# Patient Record
Sex: MALE | Race: WHITE | Employment: UNEMPLOYED | URBAN - METROPOLITAN AREA
[De-identification: names, ages, dates, MRNs, and addresses within clinical notes are randomized per-mention and may not be internally consistent; named-entity substitution may affect disease eponyms.]

---

## 2018-10-03 ENCOUNTER — APPOINTMENT (OUTPATIENT)
Dept: CT IMAGING | Age: 58
End: 2018-10-03
Payer: COMMERCIAL

## 2018-10-03 ENCOUNTER — APPOINTMENT (OUTPATIENT)
Dept: GENERAL RADIOLOGY | Age: 58
End: 2018-10-03
Payer: COMMERCIAL

## 2018-10-03 ENCOUNTER — HOSPITAL ENCOUNTER (EMERGENCY)
Age: 58
Discharge: LEFT BEFORE DISCHARGE | End: 2018-10-03
Payer: COMMERCIAL

## 2018-10-03 VITALS
BODY MASS INDEX: 21.79 KG/M2 | TEMPERATURE: 98.2 F | DIASTOLIC BLOOD PRESSURE: 89 MMHG | HEART RATE: 78 BPM | HEIGHT: 66 IN | RESPIRATION RATE: 16 BRPM | OXYGEN SATURATION: 97 % | SYSTOLIC BLOOD PRESSURE: 125 MMHG

## 2018-10-03 DIAGNOSIS — R07.9 CHEST PAIN, UNSPECIFIED TYPE: Primary | ICD-10-CM

## 2018-10-03 LAB
ANION GAP SERPL CALCULATED.3IONS-SCNC: 10 MMOL/L (ref 3–16)
BASOPHILS ABSOLUTE: 0 K/UL (ref 0–0.2)
BASOPHILS RELATIVE PERCENT: 1 %
BUN BLDV-MCNC: 12 MG/DL (ref 7–20)
CALCIUM SERPL-MCNC: 8.9 MG/DL (ref 8.3–10.6)
CHLORIDE BLD-SCNC: 105 MMOL/L (ref 99–110)
CO2: 26 MMOL/L (ref 21–32)
CREAT SERPL-MCNC: 0.9 MG/DL (ref 0.8–1.3)
D DIMER: 460 NG/ML DDU (ref 0–229)
EOSINOPHILS ABSOLUTE: 0.1 K/UL (ref 0–0.6)
EOSINOPHILS RELATIVE PERCENT: 2.7 %
GFR AFRICAN AMERICAN: >60
GFR NON-AFRICAN AMERICAN: >60
GLUCOSE BLD-MCNC: 124 MG/DL (ref 70–99)
HCT VFR BLD CALC: 39.9 % (ref 40.5–52.5)
HEMOGLOBIN: 14 G/DL (ref 13.5–17.5)
LYMPHOCYTES ABSOLUTE: 1.5 K/UL (ref 1–5.1)
LYMPHOCYTES RELATIVE PERCENT: 31.6 %
MCH RBC QN AUTO: 32.2 PG (ref 26–34)
MCHC RBC AUTO-ENTMCNC: 34.9 G/DL (ref 31–36)
MCV RBC AUTO: 92.3 FL (ref 80–100)
MONOCYTES ABSOLUTE: 0.5 K/UL (ref 0–1.3)
MONOCYTES RELATIVE PERCENT: 10.8 %
NEUTROPHILS ABSOLUTE: 2.6 K/UL (ref 1.7–7.7)
NEUTROPHILS RELATIVE PERCENT: 53.9 %
PDW BLD-RTO: 12.8 % (ref 12.4–15.4)
PLATELET # BLD: 166 K/UL (ref 135–450)
PMV BLD AUTO: 8.4 FL (ref 5–10.5)
POTASSIUM REFLEX MAGNESIUM: 4.1 MMOL/L (ref 3.5–5.1)
RBC # BLD: 4.33 M/UL (ref 4.2–5.9)
SODIUM BLD-SCNC: 141 MMOL/L (ref 136–145)
TROPONIN: <0.01 NG/ML
WBC # BLD: 4.8 K/UL (ref 4–11)

## 2018-10-03 PROCEDURE — 99285 EMERGENCY DEPT VISIT HI MDM: CPT

## 2018-10-03 PROCEDURE — 71260 CT THORAX DX C+: CPT

## 2018-10-03 PROCEDURE — 85025 COMPLETE CBC W/AUTO DIFF WBC: CPT

## 2018-10-03 PROCEDURE — 84484 ASSAY OF TROPONIN QUANT: CPT

## 2018-10-03 PROCEDURE — 93005 ELECTROCARDIOGRAM TRACING: CPT | Performed by: EMERGENCY MEDICINE

## 2018-10-03 PROCEDURE — 80048 BASIC METABOLIC PNL TOTAL CA: CPT

## 2018-10-03 PROCEDURE — 85379 FIBRIN DEGRADATION QUANT: CPT

## 2018-10-03 PROCEDURE — 6360000004 HC RX CONTRAST MEDICATION: Performed by: PHYSICIAN ASSISTANT

## 2018-10-03 PROCEDURE — 71045 X-RAY EXAM CHEST 1 VIEW: CPT

## 2018-10-03 PROCEDURE — 6370000000 HC RX 637 (ALT 250 FOR IP): Performed by: PHYSICIAN ASSISTANT

## 2018-10-03 RX ORDER — ASPIRIN 81 MG/1
324 TABLET, CHEWABLE ORAL ONCE
Status: COMPLETED | OUTPATIENT
Start: 2018-10-03 | End: 2018-10-03

## 2018-10-03 RX ADMIN — ASPIRIN 81 MG CHEWABLE TABLET 324 MG: 81 TABLET CHEWABLE at 18:58

## 2018-10-03 RX ADMIN — IOPAMIDOL 75 ML: 755 INJECTION, SOLUTION INTRAVENOUS at 19:21

## 2018-10-03 ASSESSMENT — PAIN - FUNCTIONAL ASSESSMENT: PAIN_FUNCTIONAL_ASSESSMENT: 0-10

## 2018-10-03 ASSESSMENT — PAIN DESCRIPTION - LOCATION
LOCATION: GENERALIZED
LOCATION: CHEST

## 2018-10-03 ASSESSMENT — PAIN DESCRIPTION - PAIN TYPE
TYPE: CHRONIC PAIN
TYPE: CHRONIC PAIN
TYPE: ACUTE PAIN

## 2018-10-03 ASSESSMENT — PAIN DESCRIPTION - DESCRIPTORS: DESCRIPTORS: CONSTANT

## 2018-10-03 ASSESSMENT — HEART SCORE: ECG: 1

## 2018-10-03 ASSESSMENT — PAIN DESCRIPTION - PROGRESSION
CLINICAL_PROGRESSION: NOT CHANGED
CLINICAL_PROGRESSION: NOT CHANGED

## 2018-10-03 ASSESSMENT — PAIN SCALES - GENERAL
PAINLEVEL_OUTOF10: 4
PAINLEVEL_OUTOF10: 5
PAINLEVEL_OUTOF10: 4

## 2018-10-03 ASSESSMENT — PAIN DESCRIPTION - ORIENTATION: ORIENTATION: MID;UPPER

## 2018-10-03 ASSESSMENT — PAIN SCALES - WONG BAKER: WONGBAKER_NUMERICALRESPONSE: 6

## 2018-10-05 LAB
EKG ATRIAL RATE: 82 BPM
EKG DIAGNOSIS: NORMAL
EKG P AXIS: 80 DEGREES
EKG P-R INTERVAL: 158 MS
EKG Q-T INTERVAL: 366 MS
EKG QRS DURATION: 72 MS
EKG QTC CALCULATION (BAZETT): 427 MS
EKG R AXIS: 84 DEGREES
EKG T AXIS: 57 DEGREES
EKG VENTRICULAR RATE: 82 BPM

## 2018-10-05 PROCEDURE — 93010 ELECTROCARDIOGRAM REPORT: CPT | Performed by: INTERNAL MEDICINE

## 2018-10-17 ENCOUNTER — OFFICE VISIT (OUTPATIENT)
Dept: CARDIOLOGY CLINIC | Age: 58
End: 2018-10-17
Payer: COMMERCIAL

## 2018-10-17 VITALS
OXYGEN SATURATION: 98 % | SYSTOLIC BLOOD PRESSURE: 110 MMHG | WEIGHT: 118 LBS | BODY MASS INDEX: 19.66 KG/M2 | HEIGHT: 65 IN | HEART RATE: 82 BPM | DIASTOLIC BLOOD PRESSURE: 66 MMHG

## 2018-10-17 DIAGNOSIS — R07.9 CHEST PAIN IN ADULT: Primary | ICD-10-CM

## 2018-10-17 DIAGNOSIS — F17.219 CIGARETTE NICOTINE DEPENDENCE WITH NICOTINE-INDUCED DISORDER: ICD-10-CM

## 2018-10-17 PROCEDURE — 93000 ELECTROCARDIOGRAM COMPLETE: CPT | Performed by: INTERNAL MEDICINE

## 2018-10-17 PROCEDURE — 99205 OFFICE O/P NEW HI 60 MIN: CPT | Performed by: INTERNAL MEDICINE

## 2018-10-17 RX ORDER — OXYCODONE HYDROCHLORIDE AND ACETAMINOPHEN 5; 325 MG/1; MG/1
1 TABLET ORAL PRN
COMMUNITY
End: 2018-11-08

## 2018-10-17 NOTE — PROGRESS NOTES
by me in its entirety. I confirm that the note above accurately reflects all work, treatment, procedures, and medical decision making performed by me.     Sincerely,  Augusto Jamison MD      [facility], [address] [place] [address]   [address]  Ph: [phone]  Fax: [phone]

## 2018-11-09 ENCOUNTER — OFFICE VISIT (OUTPATIENT)
Dept: FAMILY MEDICINE CLINIC | Age: 58
End: 2018-11-09
Payer: COMMERCIAL

## 2018-11-09 ENCOUNTER — HOSPITAL ENCOUNTER (OUTPATIENT)
Dept: NON INVASIVE DIAGNOSTICS | Age: 58
Discharge: HOME | End: 2018-11-09
Payer: COMMERCIAL

## 2018-11-09 VITALS
BODY MASS INDEX: 18.64 KG/M2 | SYSTOLIC BLOOD PRESSURE: 130 MMHG | HEIGHT: 66 IN | WEIGHT: 116 LBS | DIASTOLIC BLOOD PRESSURE: 80 MMHG

## 2018-11-09 DIAGNOSIS — Z72.0 TOBACCO ABUSE: ICD-10-CM

## 2018-11-09 DIAGNOSIS — M50.30 DDD (DEGENERATIVE DISC DISEASE), CERVICAL: ICD-10-CM

## 2018-11-09 DIAGNOSIS — M54.42 CHRONIC LEFT-SIDED LOW BACK PAIN WITH LEFT-SIDED SCIATICA: ICD-10-CM

## 2018-11-09 DIAGNOSIS — R07.9 CHEST PAIN IN ADULT: ICD-10-CM

## 2018-11-09 DIAGNOSIS — R11.0 CHRONIC NAUSEA: ICD-10-CM

## 2018-11-09 DIAGNOSIS — Z23 NEED FOR SHINGLES VACCINE: ICD-10-CM

## 2018-11-09 DIAGNOSIS — I49.9 CARDIAC ARRHYTHMIA, UNSPECIFIED CARDIAC ARRHYTHMIA TYPE: Primary | ICD-10-CM

## 2018-11-09 DIAGNOSIS — G89.29 CHRONIC LEFT-SIDED LOW BACK PAIN WITH LEFT-SIDED SCIATICA: ICD-10-CM

## 2018-11-09 DIAGNOSIS — R07.2 PRECORDIAL CHEST PAIN: ICD-10-CM

## 2018-11-09 DIAGNOSIS — Z11.59 ENCOUNTER FOR HEPATITIS C SCREENING TEST FOR LOW RISK PATIENT: ICD-10-CM

## 2018-11-09 DIAGNOSIS — Z23 NEED FOR INFLUENZA VACCINATION: ICD-10-CM

## 2018-11-09 DIAGNOSIS — Z00.00 PREVENTATIVE HEALTH CARE: ICD-10-CM

## 2018-11-09 LAB
ALT SERPL-CCNC: 17 U/L (ref 10–40)
AMYLASE: 50 U/L (ref 25–115)
ANION GAP SERPL CALCULATED.3IONS-SCNC: 12 MMOL/L (ref 3–16)
AST SERPL-CCNC: 21 U/L (ref 15–37)
BUN BLDV-MCNC: 9 MG/DL (ref 7–20)
CALCIUM SERPL-MCNC: 9.4 MG/DL (ref 8.3–10.6)
CHLORIDE BLD-SCNC: 100 MMOL/L (ref 99–110)
CHOLESTEROL, TOTAL: 193 MG/DL (ref 0–199)
CO2: 26 MMOL/L (ref 21–32)
CREAT SERPL-MCNC: 0.9 MG/DL (ref 0.8–1.3)
GFR AFRICAN AMERICAN: >60
GFR NON-AFRICAN AMERICAN: >60
GLUCOSE BLD-MCNC: 100 MG/DL (ref 70–99)
HCT VFR BLD CALC: 43.8 % (ref 40.5–52.5)
HDLC SERPL-MCNC: 42 MG/DL (ref 40–60)
HEMOGLOBIN: 15.2 G/DL (ref 13.5–17.5)
LDL CHOLESTEROL CALCULATED: 135 MG/DL
LIPASE: 20 U/L (ref 13–60)
MCH RBC QN AUTO: 32.4 PG (ref 26–34)
MCHC RBC AUTO-ENTMCNC: 34.7 G/DL (ref 31–36)
MCV RBC AUTO: 93.5 FL (ref 80–100)
PDW BLD-RTO: 12.6 % (ref 12.4–15.4)
PLATELET # BLD: 205 K/UL (ref 135–450)
PMV BLD AUTO: 9.3 FL (ref 5–10.5)
POTASSIUM SERPL-SCNC: 4.3 MMOL/L (ref 3.5–5.1)
RBC # BLD: 4.69 M/UL (ref 4.2–5.9)
SODIUM BLD-SCNC: 138 MMOL/L (ref 136–145)
TRIGL SERPL-MCNC: 82 MG/DL (ref 0–150)
TSH SERPL DL<=0.05 MIU/L-ACNC: 1.67 UIU/ML (ref 0.27–4.2)
VLDLC SERPL CALC-MCNC: 16 MG/DL
WBC # BLD: 7 K/UL (ref 4–11)

## 2018-11-09 PROCEDURE — 93017 CV STRESS TEST TRACING ONLY: CPT

## 2018-11-09 PROCEDURE — 93350 STRESS TTE ONLY: CPT

## 2018-11-09 PROCEDURE — 99204 OFFICE O/P NEW MOD 45 MIN: CPT | Performed by: FAMILY MEDICINE

## 2018-11-09 PROCEDURE — 36415 COLL VENOUS BLD VENIPUNCTURE: CPT | Performed by: FAMILY MEDICINE

## 2018-11-09 ASSESSMENT — ENCOUNTER SYMPTOMS
ABDOMINAL PAIN: 0
VOMITING: 0
RHINORRHEA: 1
SHORTNESS OF BREATH: 0
WHEEZING: 0
NAUSEA: 1
DIARRHEA: 0
BLOOD IN STOOL: 0
CONSTIPATION: 0
SORE THROAT: 0
COUGH: 1

## 2018-11-09 NOTE — PROGRESS NOTES
Holter Monitor  Avoid NSAID medication due to the chronic intermittent nausea  Exercises given for cervical and lumbar spine since he cannot afford PT apparently. Patient was strongly advised to quit smoking. Flu Shot will be given at the pharmacy   Rx given for Shingrix shot at the pharmacy.     RTO 1 month for Chest Pain / Nausea         MICHELLE PARKER, DO

## 2018-11-10 LAB — HEPATITIS C ANTIBODY INTERPRETATION: NORMAL

## 2018-12-16 NOTE — PROGRESS NOTES
Subjective:      Patient ID: Robe Stevens is a 59 y.o. male. HPI     Chest Pain / Arrhythmia:  Patient was seen as a new patient on 11-9-18. He was following up from an ER visit in mid October for chest pain. He had ruled out for MI and had a stress echo done on 11-9-18 which was normal / negative. When I saw him, I noted a regular heart rhythm with frequent extra systole. I ordered a Holter Monitor but this has not been done. He states that due to work, he has been unable to schedule the Holter. Chronic Nausea:  Patient has a history of chronic intermittent nausea that has not responded to Protonix and Carafate. His work-up has included and EGD with biopsy that was normal.  He has also had a normal abdominal CT and Colonoscopy. His amylase and lipase were normal.  He continues to have chronic intermittent nausea. He never vomits with it. DDD Cervical Spine:  Patient has a history of cervical DDD. He was previously recommended to have PT or surgery but was unable to afford this. He cannot take NSAID's due to chronic nausea. He was given exercises to do at his initial visit on 11-9-18. He states that the exercises do not hurt and do not help either. Chronic Low Back Pain:  Patient has chronic left low back pain that radiates to his left leg. He cannot take NSAID medication due to chronic nausea. He is unable to afford PT. He was given low back exercises to do on 11-9-18. Tobacco Abuse:  Pt continues to smoke 1 ppd. He previously did not tolerate Chantix due to agitation. He is still not ready to quit. Right Hand Pain:  Patient complains of a 3 month history of pain in the right central palm that is intermittent and worse if he  something or shakes hands. Review of Systems  /80 (Site: Right Upper Arm)   Ht 5' 6\" (1.676 m)   Wt 124 lb (56.2 kg)   BMI 20.01 kg/m²    Objective:   Physical Exam   Constitutional: He is oriented to person, place, and time.  He

## 2018-12-17 ENCOUNTER — OFFICE VISIT (OUTPATIENT)
Dept: FAMILY MEDICINE CLINIC | Age: 58
End: 2018-12-17
Payer: COMMERCIAL

## 2018-12-17 VITALS
DIASTOLIC BLOOD PRESSURE: 80 MMHG | BODY MASS INDEX: 19.93 KG/M2 | WEIGHT: 124 LBS | SYSTOLIC BLOOD PRESSURE: 122 MMHG | HEIGHT: 66 IN

## 2018-12-17 DIAGNOSIS — Z72.0 TOBACCO ABUSE: ICD-10-CM

## 2018-12-17 DIAGNOSIS — I49.9 CARDIAC ARRHYTHMIA, UNSPECIFIED CARDIAC ARRHYTHMIA TYPE: ICD-10-CM

## 2018-12-17 DIAGNOSIS — R11.0 CHRONIC NAUSEA: ICD-10-CM

## 2018-12-17 DIAGNOSIS — M79.641 RIGHT HAND PAIN: ICD-10-CM

## 2018-12-17 DIAGNOSIS — R07.2 PRECORDIAL CHEST PAIN: Primary | ICD-10-CM

## 2018-12-17 DIAGNOSIS — M54.42 CHRONIC LEFT-SIDED LOW BACK PAIN WITH LEFT-SIDED SCIATICA: ICD-10-CM

## 2018-12-17 DIAGNOSIS — Z23 NEED FOR INFLUENZA VACCINATION: ICD-10-CM

## 2018-12-17 DIAGNOSIS — G89.29 CHRONIC LEFT-SIDED LOW BACK PAIN WITH LEFT-SIDED SCIATICA: ICD-10-CM

## 2018-12-17 DIAGNOSIS — M50.30 DDD (DEGENERATIVE DISC DISEASE), CERVICAL: ICD-10-CM

## 2018-12-17 PROCEDURE — 99214 OFFICE O/P EST MOD 30 MIN: CPT | Performed by: FAMILY MEDICINE

## 2018-12-17 RX ORDER — MELOXICAM 15 MG/1
15 TABLET ORAL DAILY
Qty: 30 TABLET | Refills: 5 | Status: SHIPPED | OUTPATIENT
Start: 2018-12-17 | End: 2019-10-30

## 2018-12-17 ASSESSMENT — PATIENT HEALTH QUESTIONNAIRE - PHQ9
SUM OF ALL RESPONSES TO PHQ QUESTIONS 1-9: 0
SUM OF ALL RESPONSES TO PHQ9 QUESTIONS 1 & 2: 0
1. LITTLE INTEREST OR PLEASURE IN DOING THINGS: 0
SUM OF ALL RESPONSES TO PHQ QUESTIONS 1-9: 0
2. FEELING DOWN, DEPRESSED OR HOPELESS: 0

## 2019-10-30 ENCOUNTER — OFFICE VISIT (OUTPATIENT)
Dept: FAMILY MEDICINE CLINIC | Age: 59
End: 2019-10-30
Payer: COMMERCIAL

## 2019-10-30 VITALS
WEIGHT: 121 LBS | SYSTOLIC BLOOD PRESSURE: 128 MMHG | DIASTOLIC BLOOD PRESSURE: 86 MMHG | BODY MASS INDEX: 19.44 KG/M2 | HEIGHT: 66 IN

## 2019-10-30 DIAGNOSIS — M79.641 RIGHT HAND PAIN: ICD-10-CM

## 2019-10-30 DIAGNOSIS — R11.0 CHRONIC NAUSEA: Primary | ICD-10-CM

## 2019-10-30 DIAGNOSIS — Z23 NEED FOR INFLUENZA VACCINATION: ICD-10-CM

## 2019-10-30 DIAGNOSIS — M50.30 DDD (DEGENERATIVE DISC DISEASE), CERVICAL: ICD-10-CM

## 2019-10-30 DIAGNOSIS — M54.42 CHRONIC LEFT-SIDED LOW BACK PAIN WITH LEFT-SIDED SCIATICA: ICD-10-CM

## 2019-10-30 DIAGNOSIS — Z72.0 TOBACCO ABUSE: ICD-10-CM

## 2019-10-30 DIAGNOSIS — G89.29 CHRONIC LEFT-SIDED LOW BACK PAIN WITH LEFT-SIDED SCIATICA: ICD-10-CM

## 2019-10-30 PROCEDURE — 99214 OFFICE O/P EST MOD 30 MIN: CPT | Performed by: FAMILY MEDICINE

## 2019-10-30 RX ORDER — GABAPENTIN 300 MG/1
300 CAPSULE ORAL 3 TIMES DAILY
Qty: 90 CAPSULE | Refills: 5 | Status: SHIPPED | OUTPATIENT
Start: 2019-10-30 | End: 2020-07-11

## 2019-10-30 ASSESSMENT — PATIENT HEALTH QUESTIONNAIRE - PHQ9
DEPRESSION UNABLE TO ASSESS: PT REFUSES
DEPRESSION UNABLE TO ASSESS: PT REFUSES

## 2019-11-20 ENCOUNTER — HOSPITAL ENCOUNTER (OUTPATIENT)
Dept: PHYSICAL THERAPY | Age: 59
Setting detail: THERAPIES SERIES
Discharge: HOME | End: 2019-11-20
Payer: COMMERCIAL

## 2019-11-20 PROCEDURE — 97163 PT EVAL HIGH COMPLEX 45 MIN: CPT

## 2019-11-20 PROCEDURE — 97012 MECHANICAL TRACTION THERAPY: CPT

## 2019-11-20 PROCEDURE — 97140 MANUAL THERAPY 1/> REGIONS: CPT

## 2019-11-24 ENCOUNTER — APPOINTMENT (OUTPATIENT)
Dept: CT IMAGING | Age: 59
End: 2019-11-24
Payer: COMMERCIAL

## 2019-11-24 ENCOUNTER — HOSPITAL ENCOUNTER (EMERGENCY)
Age: 59
Discharge: HOME | End: 2019-11-24
Payer: COMMERCIAL

## 2019-11-24 VITALS
OXYGEN SATURATION: 100 % | WEIGHT: 122.8 LBS | HEART RATE: 85 BPM | BODY MASS INDEX: 19.73 KG/M2 | DIASTOLIC BLOOD PRESSURE: 70 MMHG | SYSTOLIC BLOOD PRESSURE: 121 MMHG | HEIGHT: 66 IN | TEMPERATURE: 98.5 F | RESPIRATION RATE: 18 BRPM

## 2019-11-24 DIAGNOSIS — R51.9 NONINTRACTABLE HEADACHE, UNSPECIFIED CHRONICITY PATTERN, UNSPECIFIED HEADACHE TYPE: Primary | ICD-10-CM

## 2019-11-24 PROCEDURE — 96361 HYDRATE IV INFUSION ADD-ON: CPT

## 2019-11-24 PROCEDURE — 99284 EMERGENCY DEPT VISIT MOD MDM: CPT

## 2019-11-24 PROCEDURE — 6360000002 HC RX W HCPCS: Performed by: PHYSICIAN ASSISTANT

## 2019-11-24 PROCEDURE — 96375 TX/PRO/DX INJ NEW DRUG ADDON: CPT

## 2019-11-24 PROCEDURE — 2580000003 HC RX 258: Performed by: PHYSICIAN ASSISTANT

## 2019-11-24 PROCEDURE — 96374 THER/PROPH/DIAG INJ IV PUSH: CPT

## 2019-11-24 PROCEDURE — 70450 CT HEAD/BRAIN W/O DYE: CPT

## 2019-11-24 RX ORDER — DIPHENHYDRAMINE HYDROCHLORIDE 50 MG/ML
25 INJECTION INTRAMUSCULAR; INTRAVENOUS ONCE
Status: COMPLETED | OUTPATIENT
Start: 2019-11-24 | End: 2019-11-24

## 2019-11-24 RX ORDER — DEXAMETHASONE SODIUM PHOSPHATE 10 MG/ML
10 INJECTION INTRAMUSCULAR; INTRAVENOUS ONCE
Status: COMPLETED | OUTPATIENT
Start: 2019-11-24 | End: 2019-11-24

## 2019-11-24 RX ORDER — METOCLOPRAMIDE HYDROCHLORIDE 5 MG/ML
10 INJECTION INTRAMUSCULAR; INTRAVENOUS ONCE
Status: COMPLETED | OUTPATIENT
Start: 2019-11-24 | End: 2019-11-24

## 2019-11-24 RX ORDER — 0.9 % SODIUM CHLORIDE 0.9 %
1000 INTRAVENOUS SOLUTION INTRAVENOUS ONCE
Status: COMPLETED | OUTPATIENT
Start: 2019-11-24 | End: 2019-11-24

## 2019-11-24 RX ORDER — BUTALBITAL, ACETAMINOPHEN AND CAFFEINE 300; 40; 50 MG/1; MG/1; MG/1
1 CAPSULE ORAL EVERY 6 HOURS PRN
Qty: 8 CAPSULE | Refills: 0 | Status: SHIPPED | OUTPATIENT
Start: 2019-11-24 | End: 2020-07-11

## 2019-11-24 RX ADMIN — METOCLOPRAMIDE 10 MG: 5 INJECTION, SOLUTION INTRAMUSCULAR; INTRAVENOUS at 11:13

## 2019-11-24 RX ADMIN — DIPHENHYDRAMINE HYDROCHLORIDE 25 MG: 50 INJECTION, SOLUTION INTRAMUSCULAR; INTRAVENOUS at 11:13

## 2019-11-24 RX ADMIN — DEXAMETHASONE SODIUM PHOSPHATE 10 MG: 10 INJECTION INTRAMUSCULAR; INTRAVENOUS at 11:14

## 2019-11-24 RX ADMIN — SODIUM CHLORIDE 1000 ML: 9 INJECTION, SOLUTION INTRAVENOUS at 11:14

## 2019-11-24 ASSESSMENT — PAIN DESCRIPTION - PAIN TYPE
TYPE: ACUTE PAIN
TYPE: ACUTE PAIN

## 2019-11-24 ASSESSMENT — PAIN DESCRIPTION - LOCATION
LOCATION: HEAD
LOCATION: HEAD

## 2019-11-24 ASSESSMENT — PAIN DESCRIPTION - DESCRIPTORS
DESCRIPTORS: OTHER (COMMENT)
DESCRIPTORS: DISCOMFORT;ACHING

## 2019-11-24 ASSESSMENT — PAIN SCALES - GENERAL
PAINLEVEL_OUTOF10: 9
PAINLEVEL_OUTOF10: 4

## 2019-11-24 ASSESSMENT — PAIN DESCRIPTION - FREQUENCY
FREQUENCY: CONTINUOUS
FREQUENCY: CONTINUOUS

## 2019-11-24 ASSESSMENT — PAIN - FUNCTIONAL ASSESSMENT: PAIN_FUNCTIONAL_ASSESSMENT: ACTIVITIES ARE NOT PREVENTED

## 2019-11-26 ASSESSMENT — ENCOUNTER SYMPTOMS
NAUSEA: 0
VOMITING: 0
ABDOMINAL PAIN: 0

## 2019-12-05 ENCOUNTER — HOSPITAL ENCOUNTER (OUTPATIENT)
Dept: PHYSICAL THERAPY | Age: 59
Setting detail: THERAPIES SERIES
Discharge: HOME | End: 2019-12-05
Payer: COMMERCIAL

## 2019-12-05 PROCEDURE — 97140 MANUAL THERAPY 1/> REGIONS: CPT

## 2019-12-05 PROCEDURE — 97012 MECHANICAL TRACTION THERAPY: CPT

## 2019-12-05 PROCEDURE — 97110 THERAPEUTIC EXERCISES: CPT

## 2019-12-10 ENCOUNTER — HOSPITAL ENCOUNTER (OUTPATIENT)
Dept: PHYSICAL THERAPY | Age: 59
Setting detail: THERAPIES SERIES
Discharge: HOME | End: 2019-12-10
Payer: COMMERCIAL

## 2019-12-10 PROCEDURE — 97140 MANUAL THERAPY 1/> REGIONS: CPT

## 2019-12-10 PROCEDURE — 97012 MECHANICAL TRACTION THERAPY: CPT

## 2019-12-10 PROCEDURE — 97110 THERAPEUTIC EXERCISES: CPT

## 2019-12-12 ENCOUNTER — HOSPITAL ENCOUNTER (OUTPATIENT)
Dept: PHYSICAL THERAPY | Age: 59
Setting detail: THERAPIES SERIES
Discharge: HOME | End: 2019-12-12
Payer: COMMERCIAL

## 2019-12-12 PROCEDURE — 97012 MECHANICAL TRACTION THERAPY: CPT

## 2019-12-12 PROCEDURE — 97140 MANUAL THERAPY 1/> REGIONS: CPT

## 2019-12-17 ENCOUNTER — HOSPITAL ENCOUNTER (OUTPATIENT)
Dept: PHYSICAL THERAPY | Age: 59
Setting detail: THERAPIES SERIES
Discharge: HOME | End: 2019-12-17
Payer: COMMERCIAL

## 2019-12-17 PROCEDURE — 97012 MECHANICAL TRACTION THERAPY: CPT

## 2019-12-17 PROCEDURE — 97110 THERAPEUTIC EXERCISES: CPT

## 2019-12-17 PROCEDURE — 97140 MANUAL THERAPY 1/> REGIONS: CPT

## 2019-12-19 ENCOUNTER — HOSPITAL ENCOUNTER (OUTPATIENT)
Dept: PHYSICAL THERAPY | Age: 59
Setting detail: THERAPIES SERIES
Discharge: HOME | End: 2019-12-19
Payer: COMMERCIAL

## 2019-12-19 PROCEDURE — 97140 MANUAL THERAPY 1/> REGIONS: CPT

## 2019-12-19 PROCEDURE — 97012 MECHANICAL TRACTION THERAPY: CPT

## 2019-12-19 PROCEDURE — 97110 THERAPEUTIC EXERCISES: CPT

## 2019-12-26 ENCOUNTER — HOSPITAL ENCOUNTER (OUTPATIENT)
Dept: PHYSICAL THERAPY | Age: 59
Setting detail: THERAPIES SERIES
Discharge: HOME | End: 2019-12-26
Payer: COMMERCIAL

## 2019-12-26 PROCEDURE — 97110 THERAPEUTIC EXERCISES: CPT

## 2019-12-26 PROCEDURE — 97140 MANUAL THERAPY 1/> REGIONS: CPT

## 2019-12-26 PROCEDURE — 97012 MECHANICAL TRACTION THERAPY: CPT

## 2019-12-30 ENCOUNTER — HOSPITAL ENCOUNTER (OUTPATIENT)
Dept: PHYSICAL THERAPY | Age: 59
Setting detail: THERAPIES SERIES
Discharge: HOME | End: 2019-12-30
Payer: COMMERCIAL

## 2019-12-30 PROCEDURE — 97140 MANUAL THERAPY 1/> REGIONS: CPT

## 2019-12-30 PROCEDURE — 97110 THERAPEUTIC EXERCISES: CPT

## 2019-12-30 PROCEDURE — 97012 MECHANICAL TRACTION THERAPY: CPT

## 2020-01-02 ENCOUNTER — HOSPITAL ENCOUNTER (OUTPATIENT)
Dept: PHYSICAL THERAPY | Age: 60
Setting detail: THERAPIES SERIES
Discharge: HOME | End: 2020-01-02
Payer: COMMERCIAL

## 2020-01-02 PROCEDURE — 97140 MANUAL THERAPY 1/> REGIONS: CPT

## 2020-01-02 PROCEDURE — 97012 MECHANICAL TRACTION THERAPY: CPT

## 2020-01-02 NOTE — FLOWSHEET NOTE
decreased pain. 12/26/19 Pt reports when his pain is present it's intensity can be as bad as it has been but overall frequency of pain has decreased. 12/30/19 Pt reports he awoke with more pain this am , not sure why.  01/02/20 Pt reports less night pain waking since beginning PT. Objective:   Observation: 12/19/19 C4 left rotated C7 right rotated each in flexion , C4 left rotated in ext ( TTP at C4 )   Test measurements:    Cervical AROM Flexion Extension Lateral Flexion  Left Lateral Flex. Right Rotation Left Rotation Right    Eval.  11/20/19 12/19/19    41*   35  14*  15   46*   45    01/02/20    50  41  15  19   38    40                                         Exercises:  Exercise/Equipment Resistance/Repetitions Other comments   Corner stretch    Resume   T slide   Rows  bilat ext  flies           Resume   Wall tucks               Mat ex     Supine DNF                Seated cervical stretches  Lat  post lat   ant lat     ADD    Resume   Supine towel roll tucks               NS ( neutral spine) activities     seated With lumbar pillow support 11/20/19                       HEP     Seated chin tucks  scap retraction 15 x  5\" 20 x 12/10/19, 12/26.19 cues for tech. Supine towel roll chin tucks  15 x 2 12/12, 12/26 good tech                     Other Therapeutic Activities:   11/20/19 Neutral Spine (NS) Instruction. The patient demonstrated good tolerance, technique  and verbalized understanding with and of NS instruction respectively. Home Exercise Program:    12/12/19 The patient demonstrated good tolerance to and understanding of the HEP. Written instructions have been issued. Manual Treatments: 25 min  Man CTx  OBR  STM/ MFR to  Bilat. Dorsal suboccipitals,  Scalenes, lev scap and UT's,prone and supine positions  Mobs to OA, AA flex and C1-2 C2-3 right rotation each grade 3 gross cerv. Side glides L/R and cerv.  Retraction   Stretching into lateral flexion and rotation L/R each Modalities:   CTx 22#/7# 60/20 x 15 min with HP    Progression Towards Functional goals:  [x] Patient is progressing as expected towards functional goals listed. [] Progression is slowed due to complexities listed. [] Progression has been slowed due to co-morbidities. [] Plan just implemented, too soon to assess goals progression  [] Other:    Charges: Therapeutic Exercise:  [] (52001) Provided verbal/tactile cueing for activities to restore or maintain strength, flexibility, endurance, ROM for improvements with self-care, mobility, lifting and ambulation. Neuromuscular Re-Education  [] (49972) Provided verbal/tactile cueing for activities to restore or maintain balance, coordination, kinesthetic sense, posture, motor skill, proprioception for self-care, mobility, lifting, and ambulation. Therapeutic Activities:    [] (36773) Provided verbal/tactile cueing to address functional limitations related to loss of mobility, strength, balance, and coordination.      Gait Training:  [] (39037) Provided training and instruction to the patient for proper postural muscle recruitment and positioning with ambulation re-education     Home Exercise Program:    [] (96262) Reviewed/Progressed HEP activities related to strengthening, flexibility, endurance, ROM for functional self-care, mobility, lifting and ambulation   [] (67579) Reviewed/Progressed HEP activities related to improving balance, coordination, kinesthetic sense, posture, motor skill, proprioception for self-care, mobility, lifting, and ambulation      Manual Treatments:  MFR / STM / Oscillations-Mobs:  G-I, II, III, IV / Manipulation / MLD  [] (29368) Provided manual therapy to mobilize  soft tissue/joints/fluid for the purpose of modulating pain, promoting relaxation, increasing ROM, reducing/eliminating soft tissue swelling/inflammation/restriction, improving soft tissue extensibility and allowing for proper ROM for normal function with self- care,

## 2020-01-06 ENCOUNTER — HOSPITAL ENCOUNTER (OUTPATIENT)
Dept: PHYSICAL THERAPY | Age: 60
Setting detail: THERAPIES SERIES
Discharge: HOME | End: 2020-01-06
Payer: COMMERCIAL

## 2020-01-06 PROCEDURE — 97140 MANUAL THERAPY 1/> REGIONS: CPT

## 2020-01-06 PROCEDURE — 97012 MECHANICAL TRACTION THERAPY: CPT

## 2020-01-06 NOTE — FLOWSHEET NOTE
Physical Therapy Daily Treatment Note  Date:  2020    Patient Name:  Leann Nowak    :  1960  MRN: [id number]    Restrictions/Precautions:  Restrictions/Precautions  Restrictions/Precautions: Fall Risk  Position Activity Restriction  Other position/activity restrictions: Not a fall risk  Pertinent Medical History: Additional Pertinent Hx: DDD, chronic pain    Medical/Treatment Diagnosis Information:  · Diagnosis: DDD (degenerative disc disease), cervical (M50.30 ICD-10-CM); Chronic left-sided low back pain with left-sided sciatica (M54.42,G89.29 ICD-10-CM)  · Treatment Diagnosis: cervical thoracic and LS hypomobility limiting tolerance to postures and adl's    Insurance/Certification information:  [facility]  Physician Information:  Referring Practitioner: Dr Yesi Salmon of care signed (Y/N):  Routed 19    Visit# / total visits:  11  Pain level: 6-7/10     Functional Outcomes Measure:  Test:   Score:    Progress Note: []  Yes  [x]  No  Next due by: Visit #10      History of Injury: Pt reports long h/o neck and back pain for 20 + years, the pain has progressively worsened over time, he has had PT in the 4 years where cervical  traction and exercises had helped some, pt works full time doing assembly ( seated and standing), 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 States his neck and back has been sore. 12/10/19 Pt reports minimal neck/ back pain today. 19 Pt reports no significant changes thus far.  19 Patient reports he still having difficulty sleeping at night.   19 Pt reports he is having a better day today with regards to glides L/R and cerv. Retraction   Stretching into lateral flexion and rotation L/R each      Modalities:   CTx 22#/7# 60/20 x 15 min with HP    Progression Towards Functional goals:  [x] Patient is progressing as expected towards functional goals listed. [] Progression is slowed due to complexities listed. [] Progression has been slowed due to co-morbidities. [] Plan just implemented, too soon to assess goals progression  [] Other:    Charges: Therapeutic Exercise:  [] (20408) Provided verbal/tactile cueing for activities to restore or maintain strength, flexibility, endurance, ROM for improvements with self-care, mobility, lifting and ambulation. Neuromuscular Re-Education  [] (86138) Provided verbal/tactile cueing for activities to restore or maintain balance, coordination, kinesthetic sense, posture, motor skill, proprioception for self-care, mobility, lifting, and ambulation. Therapeutic Activities:    [] (29814) Provided verbal/tactile cueing to address functional limitations related to loss of mobility, strength, balance, and coordination.      Gait Training:  [] (51260) Provided training and instruction to the patient for proper postural muscle recruitment and positioning with ambulation re-education     Home Exercise Program:    [] (85335) Reviewed/Progressed HEP activities related to strengthening, flexibility, endurance, ROM for functional self-care, mobility, lifting and ambulation   [] (82238) Reviewed/Progressed HEP activities related to improving balance, coordination, kinesthetic sense, posture, motor skill, proprioception for self-care, mobility, lifting, and ambulation      Manual Treatments:  MFR / STM / Oscillations-Mobs:  G-I, II, III, IV / Manipulation / MLD  [] (15941) Provided manual therapy to mobilize  soft tissue/joints/fluid for the purpose of modulating pain, promoting relaxation, increasing ROM, reducing/eliminating soft tissue swelling/inflammation/restriction, improving

## 2020-01-08 ENCOUNTER — HOSPITAL ENCOUNTER (OUTPATIENT)
Dept: PHYSICAL THERAPY | Age: 60
Setting detail: THERAPIES SERIES
Discharge: HOME | End: 2020-01-08
Payer: COMMERCIAL

## 2020-01-08 PROCEDURE — 97110 THERAPEUTIC EXERCISES: CPT

## 2020-01-08 PROCEDURE — 97012 MECHANICAL TRACTION THERAPY: CPT

## 2020-01-08 PROCEDURE — 97140 MANUAL THERAPY 1/> REGIONS: CPT

## 2020-01-08 NOTE — FLOWSHEET NOTE
Physical Therapy Daily Treatment Note  Date:  2020    Patient Name:  Bimal Gaviria    :  1960  MRN: [id number]    Restrictions/Precautions:  Restrictions/Precautions  Restrictions/Precautions: Fall Risk  Position Activity Restriction  Other position/activity restrictions: Not a fall risk  Pertinent Medical History: Additional Pertinent Hx: DDD, chronic pain    Medical/Treatment Diagnosis Information:  · Diagnosis: DDD (degenerative disc disease), cervical (M50.30 ICD-10-CM); Chronic left-sided low back pain with left-sided sciatica (M54.42,G89.29 ICD-10-CM)  · Treatment Diagnosis: cervical thoracic and LS hypomobility limiting tolerance to postures and adl's    Insurance/Certification information:  [facility]  Physician Information:  Referring Practitioner: Dr Freida Askew of care signed (Y/N):  Routed 19    Visit# / total visits:  12  Pain level: 3/10     Functional Outcomes Measure:  Test:   Score:    Progress Note: []  Yes  [x]  No  Next due by: Visit #10      History of Injury: Pt reports long h/o neck and back pain for 20 + years, the pain has progressively worsened over time, he has had PT in the 4 years where cervical  traction and exercises had helped some, pt works full time doing assembly ( seated and standing), 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 States his neck and back has been sore. 12/10/19 Pt reports minimal neck/ back pain today. 19 Pt reports no significant changes thus far.  19 Patient reports he still having difficulty sleeping at night.   19 Pt reports he is having a better day today with regards to decreased pain.   12/26/19 Pt reports when his pain is present it's intensity can be as bad as it has been but overall frequency of pain has decreased. 12/30/19 Pt reports he awoke with more pain this am , not sure why.  01/02/20 Pt reports less night pain waking since beginning PT.  01/06/20 Patient reports frequency of symptoms is decreasing. 01/08/20 Patient reports the right side of his neck was pulling more at work today. Objective:   Observation: 12/19/19 C4 left rotated C7 right rotated each in flexion , C4 left rotated in ext ( TTP at C4 )   Test measurements:    Cervical AROM Flexion Extension Lateral Flexion  Left Lateral Flex. Right Rotation Left Rotation Right    Eval.  11/20/19 12/19/19    41*   35  14*  15   46*   45    01/02/20    50  41  15  19   38    40   01/08/20    36  45  18   14   52    40*                                Exercises:  Exercise/Equipment Resistance/Repetitions Other comments   Corner stretch    Resume   T slide   Rows  bilat ext  flies             Wall tucks               Mat ex     Supine DNF                Seated cervical stretches  Lat  post lat   ant lat     ADD    Resume   Supine towel roll tucks 15 x 2              NS ( neutral spine) activities     seated With lumbar pillow support 11/20/19                       HEP     Seated chin tucks  scap retraction 15 x  5\" 20 x 12/10/19, 12/26.19 cues for tech. Supine towel roll chin tucks  15 x 2 12/12, 12/26 good tech    Cervical stretches   lateral   Post lat. Ant. lat     30\" x 2 L/R ea 1-2 x daily    01/08/20               Other Therapeutic Activities:   11/20/19 Neutral Spine (NS) Instruction. The patient demonstrated good tolerance, technique  and verbalized understanding with and of NS instruction respectively. Home Exercise Program:    12/12/19 The patient demonstrated good tolerance to and understanding of the HEP. Written instructions have been issued.     Manual Treatments: 25 min  Man CTx  OBR  STM/ MFR to  Bilat. Dorsal suboccipitals,  Scalenes, lev scap and UT's,prone and supine positions  Mobs to OA, AA flex and C1-2 C2-3 right rotation each grade 3 gross cerv. Side glides L/R and cerv. Retraction   Stretching into lateral flexion and rotation L/R each      Modalities:   CTx 22#/7# 60/20 x 15 min with HP    Progression Towards Functional goals:  [x] Patient is progressing as expected towards functional goals listed. [] Progression is slowed due to complexities listed. [] Progression has been slowed due to co-morbidities. [] Plan just implemented, too soon to assess goals progression  [] Other:    Charges: Therapeutic Exercise:  [] (41256) Provided verbal/tactile cueing for activities to restore or maintain strength, flexibility, endurance, ROM for improvements with self-care, mobility, lifting and ambulation. Neuromuscular Re-Education  [] (64307) Provided verbal/tactile cueing for activities to restore or maintain balance, coordination, kinesthetic sense, posture, motor skill, proprioception for self-care, mobility, lifting, and ambulation. Therapeutic Activities:    [] (52734) Provided verbal/tactile cueing to address functional limitations related to loss of mobility, strength, balance, and coordination.      Gait Training:  [] (41736) Provided training and instruction to the patient for proper postural muscle recruitment and positioning with ambulation re-education     Home Exercise Program:    [] (06958) Reviewed/Progressed HEP activities related to strengthening, flexibility, endurance, ROM for functional self-care, mobility, lifting and ambulation   [] (65186) Reviewed/Progressed HEP activities related to improving balance, coordination, kinesthetic sense, posture, motor skill, proprioception for self-care, mobility, lifting, and ambulation      Manual Treatments:  MFR / STM / Oscillations-Mobs:  G-I, II, III, IV / Manipulation / MLD  [] (51416) Provided manual therapy to mobilize  soft tissue/joints/fluid for the purpose of modulating pain, promoting relaxation, increasing ROM, reducing/eliminating soft tissue swelling/inflammation/restriction, improving soft tissue extensibility and allowing for proper ROM for normal function with self- care, mobility, lifting and ambulation.         Timed Code Treatment Minutes: 30   Total Treatment Minutes: 50     [] EVAL (LOW) 17176   [] EVAL (MOD) 21087   [] EVAL (HIGH) 21267   [] RE-EVAL   [] TE (73863) x     [] Aquatic (21119) x  [] NMR (59480)   x  [] Aquatic Group (46455) x  [x] Manual (83300) x 2   [] Ultrasound (07579) x  [] TA (52063) x  [x] Mech Traction (02320)  [] Ionto (15177)           [] ES (un) (87380):   [] Vasopump (46323) [] Other:      Assessment  [x] Patient tolerated treatment well [] Patient limited by fatigue  [] Patient limited by pain  [] Patient limited by other medical complications  [] Other:     Prognosis: [] Good [x] Fair  [] Poor    Goals:    Short term goals  Time Frame for Short term goals: 2-3 weeks  Short term goal 1: improve cerv. rotation 5 or better left and right each to improve ease of cerv. mobility  Partially MET     Long term goals  Time Frame for Long term goals : 4-6 weeks  Long term goal 1: improve cerv. rotation 10 or better left and right each to improve ease of cerv. mobility Partially MET  Long term goal 2: decrease c/o neck/ thoracic pain25% or better with work and household adl's MET   Long term goal 3: pt will be independent with neutral spine with seated and standing postures Partially MET    Patient Requires Follow-up: [] Yes  [x] No    Plan:   [] Continue per plan of care [] Alter current plan (see comments)  [] Plan of care initiated [] Hold pending MD visit [x] Discharge    Plan for Next Session:   Electronically signed by:  Enrrique Erickson PT

## 2020-01-09 NOTE — DISCHARGE SUMMARY
Physical Therapy Discharge Note  Date: 2020    Patient Name: Nathaly Araujo  : 1960  MRN: [id number]    Restrictions/Precautions:  Restrictions/Precautions  Restrictions/Precautions: Fall Risk  Position Activity Restriction  Other position/activity restrictions: Not a fall risk  Pertinent Medical History: Additional Pertinent Hx: DDD, chronic pain     Medical/Treatment Diagnosis Information:  · Diagnosis: DDD (degenerative disc disease), cervical (M50.30 ICD-10-CM); Chronic left-sided low back pain with left-sided sciatica (M54.42,G89.29 ICD-10-CM)  · Treatment Diagnosis: cervical thoracic and LS hypomobility limiting tolerance to postures and adl's     Insurance/Certification information:  [facility]  Physician Information:  Referring Practitioner: Dr Johnny Perez    Dates of Service:29  Total # of Visits:12  Cancel/No Shows to date:0    Medicare Cap Total for 2019:    Functional Outcomes Measures: at discharge  Test:  Score:    Treatment to Date:  [x] Therapeutic Exercise  [x] Modalities:  [x] Therapeutic Activity     [] Ultrasound  [] Electrical Stim   [] Gait Training      [x] Cervical Traction    [] Lumbar Traction  [x] Neuromuscular Re-education  [x] Cold/hotpack [] Iontophoresis  [x] Instruction in HEP      Other:  [x] Manual Therapy       []    [] Aquatic Therapy       []                            Significant Findings At Last Visit:    Subjective:20 Patient reports frequency of symptoms is decreasing. 20 Patient reports the right side of his neck was pulling more at work today.                Objective:   Observation:  · Test measurements: 19 C4 left rotated C7 right rotated each in flexion , C4 left rotated in ext ( TTP at C4 )  · Test measurements:    Cervical AROM Flexion Extension Lateral Flexion  Left Lateral Flex. Right Rotation Left Rotation Right    Eval.  19    41*   35  14*  15   46*   45    20    50  41  15  19   38

## 2020-01-13 ENCOUNTER — APPOINTMENT (OUTPATIENT)
Dept: PHYSICAL THERAPY | Age: 60
End: 2020-01-13
Payer: COMMERCIAL

## 2020-01-15 ENCOUNTER — APPOINTMENT (OUTPATIENT)
Dept: PHYSICAL THERAPY | Age: 60
End: 2020-01-15
Payer: COMMERCIAL

## 2020-01-22 ENCOUNTER — TELEPHONE (OUTPATIENT)
Dept: FAMILY MEDICINE CLINIC | Age: 60
End: 2020-01-22

## 2020-01-22 NOTE — TELEPHONE ENCOUNTER
He has chronic nausea that has been extensively evaluated without a known cause. He also has chronic neck and low back pain that I have also addressed in his most recent visit. Please give him the next available appointment but he does not need to be worked in.

## 2020-01-25 NOTE — PROGRESS NOTES
Exam  Vitals signs reviewed. Constitutional:       General: He is not in acute distress. Appearance: He is well-developed. HENT:      Head: Normocephalic. Right Ear: External ear normal.      Left Ear: External ear normal.   Neck:      Thyroid: No thyromegaly. Vascular: No carotid bruit or JVD. Cardiovascular:      Rate and Rhythm: Normal rate and regular rhythm. Heart sounds: Normal heart sounds. No murmur. Pulmonary:      Effort: Pulmonary effort is normal.      Breath sounds: Normal breath sounds. No wheezing or rales. Lymphadenopathy:      Cervical: No cervical adenopathy. Neurological:      Mental Status: He is alert and oriented to person, place, and time. Assessment:      Chronic Nausea  DDD Cervical  Chronic Low Back Pain  Bilateral Hand Pain  Tobacco Abuse       Plan:      Rx Zofran 4 mg TID prn nausea   Resume the Neurontin 300 mg TID  MRI Lumbar and Cervical Spine  Flu Shot Declined   Patient was strongly advised to quit smoking.      RTO 6 months for DDD Cervical / Back Pain / Hand Pain        MICHELLE PARKER, DO

## 2020-01-27 ENCOUNTER — OFFICE VISIT (OUTPATIENT)
Dept: FAMILY MEDICINE CLINIC | Age: 60
End: 2020-01-27
Payer: COMMERCIAL

## 2020-01-27 VITALS
WEIGHT: 120.2 LBS | BODY MASS INDEX: 19.32 KG/M2 | HEIGHT: 66 IN | SYSTOLIC BLOOD PRESSURE: 132 MMHG | DIASTOLIC BLOOD PRESSURE: 88 MMHG

## 2020-01-27 PROCEDURE — 99214 OFFICE O/P EST MOD 30 MIN: CPT | Performed by: FAMILY MEDICINE

## 2020-01-27 PROCEDURE — G8484 FLU IMMUNIZE NO ADMIN: HCPCS | Performed by: FAMILY MEDICINE

## 2020-01-27 PROCEDURE — 4004F PT TOBACCO SCREEN RCVD TLK: CPT | Performed by: FAMILY MEDICINE

## 2020-01-27 PROCEDURE — G8420 CALC BMI NORM PARAMETERS: HCPCS | Performed by: FAMILY MEDICINE

## 2020-01-27 PROCEDURE — G8427 DOCREV CUR MEDS BY ELIG CLIN: HCPCS | Performed by: FAMILY MEDICINE

## 2020-01-27 PROCEDURE — 3017F COLORECTAL CA SCREEN DOC REV: CPT | Performed by: FAMILY MEDICINE

## 2020-01-27 RX ORDER — ONDANSETRON 4 MG/1
4 TABLET, FILM COATED ORAL 3 TIMES DAILY PRN
Qty: 90 TABLET | Refills: 5 | Status: SHIPPED | OUTPATIENT
Start: 2020-01-27 | End: 2020-07-13

## 2020-02-13 ENCOUNTER — HOSPITAL ENCOUNTER (OUTPATIENT)
Dept: MRI IMAGING | Age: 60
Discharge: HOME | End: 2020-02-13
Payer: COMMERCIAL

## 2020-02-13 PROCEDURE — 72141 MRI NECK SPINE W/O DYE: CPT

## 2020-02-13 PROCEDURE — 72148 MRI LUMBAR SPINE W/O DYE: CPT

## 2020-02-18 ENCOUNTER — TELEPHONE (OUTPATIENT)
Dept: FAMILY MEDICINE CLINIC | Age: 60
End: 2020-02-18

## 2020-02-18 NOTE — TELEPHONE ENCOUNTER
Patient states that Dr. Lella Cockayne needs a referral faxed over before he can schedule with him. States his insurance is not covered by Dr. Tonie Saucedo. Please return patient's call.

## 2020-07-12 NOTE — PROGRESS NOTES
Subjective:      Patient ID: Estefania Chavez is a 56 y.o. male. HPI     Chronic Nausea:  Patient has a history of chronic intermittent nausea that has not responded to Protonix and Carafate or Zofran. His work-up has included and EGD with biopsy that was normal.  He has also had a normal abdominal CT and Colonoscopy. His amylase and lipase were normal.  He continues to have chronic intermittent nausea. He never vomits with it. DDD Cervical Spine:  Patient has a history of cervical DDD. He states that cervical traction has helped in the past.  He was previously prescribed Mobic 15 mg daily but it did not help. He finished Physical Therapy on 1-8-20 after 4-6 weeks. There has been mild improvement. At his visit on 10-30-19, he was started on Neurontin 300 mg and titrated to TID. He stopped taking it when a dentist prescribed Vicodin for dental pain (now off). He had an MRI of the cervical spine 2-13-20 showing mild to moderate DDD and mild spinal stenosis and multilevel foraminal narrowing. He was referred to Neurosurgeon Sukumar Perez at that time. He was seen and had neck injections which helped considerably. Over the last few days, the neck pain is starting again. Chronic Low Back Pain:  Patient has chronic left low back pain that radiates to his left leg. It is worse if he is on his feet for prolonged periods. He was started on Mobic 15 mg once daily at his visit on 12-17-18. It did not help and he stopped taking it. He finished Physical Therapy on 1-8-20 after 4-6 weeks  At his visit on 10-30-19, he was started on Neurontin 300 mg and titrated to TID. He stopped taking it when a dentist prescribed Vicodin for dental pain (now off). He had an MRI of the lumbar spine on 2-13-20 showing multilevel DDD and mild canal stenosis at L3-4 and foraminal narrowing at L5-S1. He had cervical injections that seemed to also help with the low back pain.       Right Shoulder Pain:  Patient

## 2020-07-13 ENCOUNTER — OFFICE VISIT (OUTPATIENT)
Dept: FAMILY MEDICINE CLINIC | Age: 60
End: 2020-07-13
Payer: COMMERCIAL

## 2020-07-13 VITALS
WEIGHT: 124.4 LBS | SYSTOLIC BLOOD PRESSURE: 117 MMHG | TEMPERATURE: 98.8 F | BODY MASS INDEX: 19.99 KG/M2 | HEIGHT: 66 IN | DIASTOLIC BLOOD PRESSURE: 79 MMHG | OXYGEN SATURATION: 91 %

## 2020-07-13 PROCEDURE — 3017F COLORECTAL CA SCREEN DOC REV: CPT | Performed by: FAMILY MEDICINE

## 2020-07-13 PROCEDURE — 99214 OFFICE O/P EST MOD 30 MIN: CPT | Performed by: FAMILY MEDICINE

## 2020-07-13 PROCEDURE — G8427 DOCREV CUR MEDS BY ELIG CLIN: HCPCS | Performed by: FAMILY MEDICINE

## 2020-07-13 PROCEDURE — 4004F PT TOBACCO SCREEN RCVD TLK: CPT | Performed by: FAMILY MEDICINE

## 2020-07-13 PROCEDURE — G8420 CALC BMI NORM PARAMETERS: HCPCS | Performed by: FAMILY MEDICINE

## 2020-07-21 ENCOUNTER — TELEPHONE (OUTPATIENT)
Dept: FAMILY MEDICINE CLINIC | Age: 60
End: 2020-07-21

## 2020-07-24 ENCOUNTER — TELEPHONE (OUTPATIENT)
Dept: FAMILY MEDICINE CLINIC | Age: 60
End: 2020-07-24

## 2020-07-24 NOTE — TELEPHONE ENCOUNTER
CS called requesting a new order for the MRI. Typically they are without or with and without. How would you like to order?     Pt is scheduled 7-28 for the exam.

## 2020-07-24 NOTE — TELEPHONE ENCOUNTER
Per Nawaf Stratton from [address] [place] [address], the radiologist is now requesting an updated order for MRI without contrast only. Please advise.

## 2020-07-28 ENCOUNTER — HOSPITAL ENCOUNTER (OUTPATIENT)
Dept: MRI IMAGING | Age: 60
Discharge: HOME | End: 2020-07-28
Payer: COMMERCIAL

## 2020-07-28 PROCEDURE — 73221 MRI JOINT UPR EXTREM W/O DYE: CPT

## 2020-10-19 ENCOUNTER — TELEPHONE (OUTPATIENT)
Dept: FAMILY MEDICINE CLINIC | Age: 60
End: 2020-10-19

## 2020-10-19 NOTE — TELEPHONE ENCOUNTER
Carrie Kan, [address] [facility] [address], [address] [place] [address]   Phone: [phone]

## 2020-10-19 NOTE — TELEPHONE ENCOUNTER
----- Message from Jerry Mae sent at 10/19/2020  2:07 PM EDT -----  Subject: Message to Provider    QUESTIONS  Information for Provider? Patient states he was suppose to have injection   in shoulder. States he misplaced the paper work with the number . States   it was due to be done after MRI please call to advise.  ---------------------------------------------------------------------------  --------------  CALL BACK INFO  What is the best way for the office to contact you? OK to leave message on   voicemail  Preferred Call Back Phone Number? [phone]  ---------------------------------------------------------------------------  --------------  SCRIPT ANSWERS  Relationship to Patient?  Self

## 2020-10-19 NOTE — TELEPHONE ENCOUNTER
----- Message from Lorena Smyth sent at 10/19/2020  2:07 PM EDT -----  Subject: Message to Provider    QUESTIONS  Information for Provider? Patient states he was suppose to have injection   in shoulder. States he misplaced the paper work with the number . States   it was due to be done after MRI please call to advise.  ---------------------------------------------------------------------------  --------------  CALL BACK INFO  What is the best way for the office to contact you? OK to leave message on   voicemail  Preferred Call Back Phone Number? [phone]  ---------------------------------------------------------------------------  --------------  SCRIPT ANSWERS  Relationship to Patient?  Self

## 2021-03-03 NOTE — PROGRESS NOTES
Subjective:      Patient ID: Doyle Leonard is a 59 y.o. male. HPI     DDD Cervical Spine:  Patient has a history of cervical DDD. He states that cervical traction has helped in the past.  He was previously prescribed Mobic 15 mg daily but it did not help. He has had Physical Therapy with mild improvement. He was previously prescribed Neurontin but stopped it when his dentis prescribed pain medication temporarily. He had an MRI of the cervical spine 2-13-20 showing mild to moderate DDD and mild spinal stenosis and multilevel foraminal narrowing. He was seen by Neurosurgeon (Sarah) and had neck injections which helped considerably. He states that his neck pain flared yesterday worse than ever. He needs a referral back to [facility] to see them again. Chronic Low Back Pain:  Patient has chronic left low back pain that radiates to his left leg. It is worse if he is on his feet for prolonged periods. He was previously treated with Mobic (no help) and Physical Therapy (some improvement). He was previously prescribed Neurontin but stopped it when his dentist gave him pain medication temporarily. He had an MRI of the lumbar spine on 2-13-20 showing multilevel DDD and mild canal stenosis at L3-4 and foraminal narrowing at L5-S1. Chronic Nausea:  Patient has a history of chronic intermittent nausea that has not responded to Protonix and Carafate or Zofran. His work-up has included and EGD with biopsy that was normal.  He has also had a normal abdominal CT and Colonoscopy. His amylase and lipase were normal.  He continues to have chronic intermittent nausea. He never vomits with it. He now has some discomfort in the epigastric region. Right Knee Pain:  Patient sat [facility] style yesterday and states that it then took him about 10 minutes to be able to straighten the knee out after that. The pain has since mainly resolved but gets stiff with inactivity.       Depression: Patient scored a 13 on his depression scale (moderate depression). He is not suicidal.  He states that he is depressed over world news. I discussed treating his depression with him but he would like to research it first.      Tobacco Abuse:  Patient continues to smoke 1 ppd. He previously did not tolerate Chantix due to agitation. He is still not ready to quit. PHQ Scores 3/4/2021 12/17/2018   PHQ2 Score 3 0   PHQ9 Score 10 0     Interpretation of Total Score Depression Severity: 1-4 = Minimal depression, 5-9 = Mild depression, 10-14 = Moderate depression, 15-19 = Moderately severe depression, 20-27 = Severe depression     Review of Systems   Constitutional: Negative for chills and fever. Musculoskeletal: Positive for arthralgias and myalgias. Psychiatric/Behavioral: Positive for agitation, decreased concentration, dysphoric mood and sleep disturbance. Negative for suicidal ideas. The patient is nervous/anxious. /70 (Site: Right Upper Arm)   Temp 97.8 °F (36.6 °C) (Infrared)   Ht 5' 6\" (1.676 m)   Wt 121 lb (54.9 kg)   BMI 19.53 kg/m²    Objective:   Physical Exam  Vitals signs reviewed. Constitutional:       General: He is not in acute distress. Appearance: He is well-developed. HENT:      Head: Normocephalic. Right Ear: External ear normal.      Left Ear: External ear normal.   Neck:      Thyroid: No thyromegaly. Vascular: No carotid bruit or JVD. Cardiovascular:      Rate and Rhythm: Normal rate and regular rhythm. Heart sounds: Normal heart sounds. No murmur. Pulmonary:      Effort: Pulmonary effort is normal.      Breath sounds: Normal breath sounds. No wheezing or rales. Abdominal:      General: Abdomen is flat. Bowel sounds are normal. There is no distension. Palpations: Abdomen is soft. There is no mass. Tenderness: There is no abdominal tenderness. There is no guarding or rebound. Hernia: No hernia is present.    Lymphadenopathy:

## 2021-03-04 ENCOUNTER — TELEPHONE (OUTPATIENT)
Dept: FAMILY MEDICINE CLINIC | Age: 61
End: 2021-03-04

## 2021-03-04 ENCOUNTER — OFFICE VISIT (OUTPATIENT)
Dept: FAMILY MEDICINE CLINIC | Age: 61
End: 2021-03-04
Payer: COMMERCIAL

## 2021-03-04 VITALS
BODY MASS INDEX: 19.44 KG/M2 | SYSTOLIC BLOOD PRESSURE: 120 MMHG | HEIGHT: 66 IN | WEIGHT: 121 LBS | TEMPERATURE: 97.8 F | DIASTOLIC BLOOD PRESSURE: 70 MMHG

## 2021-03-04 DIAGNOSIS — Z23 NEED FOR SHINGLES VACCINE: ICD-10-CM

## 2021-03-04 DIAGNOSIS — R11.0 CHRONIC NAUSEA: ICD-10-CM

## 2021-03-04 DIAGNOSIS — Z72.0 TOBACCO ABUSE: ICD-10-CM

## 2021-03-04 DIAGNOSIS — M50.30 DDD (DEGENERATIVE DISC DISEASE), CERVICAL: Primary | ICD-10-CM

## 2021-03-04 DIAGNOSIS — G89.29 CHRONIC LEFT-SIDED LOW BACK PAIN WITH LEFT-SIDED SCIATICA: ICD-10-CM

## 2021-03-04 DIAGNOSIS — F32.4 MAJOR DEPRESSION SINGLE EPISODE, IN PARTIAL REMISSION (HCC): ICD-10-CM

## 2021-03-04 DIAGNOSIS — M54.42 CHRONIC LEFT-SIDED LOW BACK PAIN WITH LEFT-SIDED SCIATICA: ICD-10-CM

## 2021-03-04 PROCEDURE — G8427 DOCREV CUR MEDS BY ELIG CLIN: HCPCS | Performed by: FAMILY MEDICINE

## 2021-03-04 PROCEDURE — 99214 OFFICE O/P EST MOD 30 MIN: CPT | Performed by: FAMILY MEDICINE

## 2021-03-04 PROCEDURE — 4004F PT TOBACCO SCREEN RCVD TLK: CPT | Performed by: FAMILY MEDICINE

## 2021-03-04 PROCEDURE — G8420 CALC BMI NORM PARAMETERS: HCPCS | Performed by: FAMILY MEDICINE

## 2021-03-04 PROCEDURE — 3017F COLORECTAL CA SCREEN DOC REV: CPT | Performed by: FAMILY MEDICINE

## 2021-03-04 PROCEDURE — G8484 FLU IMMUNIZE NO ADMIN: HCPCS | Performed by: FAMILY MEDICINE

## 2021-03-04 RX ORDER — CHLORHEXIDINE GLUCONATE 0.12 MG/ML
RINSE ORAL
COMMUNITY
Start: 2021-02-19 | End: 2021-09-13

## 2021-03-04 RX ORDER — GABAPENTIN 300 MG/1
300 CAPSULE ORAL 3 TIMES DAILY
Qty: 90 CAPSULE | Refills: 5 | Status: SHIPPED | OUTPATIENT
Start: 2021-03-04 | End: 2021-06-01

## 2021-03-04 RX ORDER — ZOSTER VACCINE RECOMBINANT, ADJUVANTED 50 MCG/0.5
0.5 KIT INTRAMUSCULAR ONCE
Qty: 0.5 ML | Refills: 0 | Status: SHIPPED | OUTPATIENT
Start: 2021-03-04 | End: 2021-03-04

## 2021-03-04 RX ORDER — HYDROCODONE BITARTRATE AND ACETAMINOPHEN 5; 325 MG/1; MG/1
TABLET ORAL
COMMUNITY
Start: 2021-02-19 | End: 2021-09-13

## 2021-03-04 ASSESSMENT — PATIENT HEALTH QUESTIONNAIRE - PHQ9
8. MOVING OR SPEAKING SO SLOWLY THAT OTHER PEOPLE COULD HAVE NOTICED. OR THE OPPOSITE, BEING SO FIGETY OR RESTLESS THAT YOU HAVE BEEN MOVING AROUND A LOT MORE THAN USUAL: 3
SUM OF ALL RESPONSES TO PHQ QUESTIONS 1-9: 10
SUM OF ALL RESPONSES TO PHQ9 QUESTIONS 1 & 2: 3
7. TROUBLE CONCENTRATING ON THINGS, SUCH AS READING THE NEWSPAPER OR WATCHING TELEVISION: 0
10. IF YOU CHECKED OFF ANY PROBLEMS, HOW DIFFICULT HAVE THESE PROBLEMS MADE IT FOR YOU TO DO YOUR WORK, TAKE CARE OF THINGS AT HOME, OR GET ALONG WITH OTHER PEOPLE: 2
5. POOR APPETITE OR OVEREATING: 0
6. FEELING BAD ABOUT YOURSELF - OR THAT YOU ARE A FAILURE OR HAVE LET YOURSELF OR YOUR FAMILY DOWN: 0
SUM OF ALL RESPONSES TO PHQ QUESTIONS 1-9: 10
4. FEELING TIRED OR HAVING LITTLE ENERGY: 1
1. LITTLE INTEREST OR PLEASURE IN DOING THINGS: 0

## 2021-03-04 ASSESSMENT — COLUMBIA-SUICIDE SEVERITY RATING SCALE - C-SSRS
2. HAVE YOU ACTUALLY HAD ANY THOUGHTS OF KILLING YOURSELF?: NO
6. HAVE YOU EVER DONE ANYTHING, STARTED TO DO ANYTHING, OR PREPARED TO DO ANYTHING TO END YOUR LIFE?: NO

## 2021-03-04 NOTE — TELEPHONE ENCOUNTER
Patient states the fax number for [address] [place] [address] for the referral is [phone]. Also, patient states he lost the shingles vaccine Rx. He is requesting a returned call.

## 2021-03-05 NOTE — TELEPHONE ENCOUNTER
Please fax his referral from yesterday to [facility]. He does not need the Rx for the shingles vaccine. It was just to remind him to ask his pharmacist for it.

## 2021-03-05 NOTE — TELEPHONE ENCOUNTER
F: [phone]  Referral faxed.      Attempted to call pt-- rang twice then went to rapid busy signal.

## 2021-03-11 LAB
BASOPHILS ABSOLUTE: 0 K/UL (ref 0–0.2)
BASOPHILS RELATIVE PERCENT: 0.7 %
EOSINOPHILS ABSOLUTE: 0.1 K/UL (ref 0–0.6)
EOSINOPHILS RELATIVE PERCENT: 2.6 %
HCT VFR BLD CALC: 41 % (ref 40.5–52.5)
HEMOGLOBIN: 14.3 G/DL (ref 13.5–17.5)
LYMPHOCYTES ABSOLUTE: 1.4 K/UL (ref 1–5.1)
LYMPHOCYTES RELATIVE PERCENT: 25 %
MCH RBC QN AUTO: 33.4 PG (ref 26–34)
MCHC RBC AUTO-ENTMCNC: 34.7 G/DL (ref 31–36)
MCV RBC AUTO: 96.3 FL (ref 80–100)
MONOCYTES ABSOLUTE: 0.7 K/UL (ref 0–1.3)
MONOCYTES RELATIVE PERCENT: 11.6 %
NEUTROPHILS ABSOLUTE: 3.4 K/UL (ref 1.7–7.7)
NEUTROPHILS RELATIVE PERCENT: 60.1 %
PDW BLD-RTO: 13.9 % (ref 12.4–15.4)
PLATELET # BLD: 200 K/UL (ref 135–450)
PMV BLD AUTO: 9.6 FL (ref 5–10.5)
RBC # BLD: 4.26 M/UL (ref 4.2–5.9)
WBC # BLD: 5.6 K/UL (ref 4–11)

## 2021-03-12 LAB
A/G RATIO: 2 (ref 1.1–2.2)
ALBUMIN SERPL-MCNC: 4.5 G/DL (ref 3.4–5)
ALP BLD-CCNC: 78 U/L (ref 40–129)
ALT SERPL-CCNC: 14 U/L (ref 10–40)
ANION GAP SERPL CALCULATED.3IONS-SCNC: 12 MMOL/L (ref 3–16)
AST SERPL-CCNC: 20 U/L (ref 15–37)
BILIRUB SERPL-MCNC: 0.3 MG/DL (ref 0–1)
BUN BLDV-MCNC: 9 MG/DL (ref 7–20)
C-REACTIVE PROTEIN: 5.1 MG/L (ref 0–5.1)
CALCIUM SERPL-MCNC: 9.1 MG/DL (ref 8.3–10.6)
CHLORIDE BLD-SCNC: 104 MMOL/L (ref 99–110)
CO2: 25 MMOL/L (ref 21–32)
CREAT SERPL-MCNC: 0.9 MG/DL (ref 0.8–1.3)
GFR AFRICAN AMERICAN: >60
GFR NON-AFRICAN AMERICAN: >60
GLOBULIN: 2.3 G/DL
GLUCOSE BLD-MCNC: 98 MG/DL (ref 70–99)
LIPASE: 17 U/L (ref 13–60)
POTASSIUM SERPL-SCNC: 5 MMOL/L (ref 3.5–5.1)
SODIUM BLD-SCNC: 141 MMOL/L (ref 136–145)
T4 FREE: 1.2 NG/DL (ref 0.9–1.8)
TOTAL PROTEIN: 6.8 G/DL (ref 6.4–8.2)
TSH SERPL DL<=0.05 MIU/L-ACNC: 1.75 UIU/ML (ref 0.27–4.2)

## 2021-03-22 ENCOUNTER — HOSPITAL ENCOUNTER (OUTPATIENT)
Dept: CT IMAGING | Age: 61
Discharge: HOME | End: 2021-03-22
Payer: COMMERCIAL

## 2021-03-22 DIAGNOSIS — R11.0 NAUSEA: ICD-10-CM

## 2021-03-22 PROCEDURE — 6360000004 HC RX CONTRAST MEDICATION: Performed by: INTERNAL MEDICINE

## 2021-03-22 PROCEDURE — 74177 CT ABD & PELVIS W/CONTRAST: CPT

## 2021-03-22 RX ADMIN — IOPAMIDOL 75 ML: 755 INJECTION, SOLUTION INTRAVENOUS at 07:14

## 2021-03-22 RX ADMIN — IOHEXOL 50 ML: 240 INJECTION, SOLUTION INTRATHECAL; INTRAVASCULAR; INTRAVENOUS; ORAL at 07:14

## 2021-03-23 ENCOUNTER — TELEPHONE (OUTPATIENT)
Dept: FAMILY MEDICINE CLINIC | Age: 61
End: 2021-03-23

## 2021-03-23 DIAGNOSIS — I71.40 ABDOMINAL AORTIC ANEURYSM (AAA) WITHOUT RUPTURE: Primary | ICD-10-CM

## 2021-03-23 NOTE — TELEPHONE ENCOUNTER
He does not actually need the Shingles Rx. He can just ask for the shot at the pharmacy. I have reviewed the CT report. It is recommended that he see a vascular surgeon to follow this.   I would recommend:    Ju Allen MD  [address] [place] [id number]   Phone [phone]  Fax: [phone]

## 2021-03-31 ENCOUNTER — PROCEDURE VISIT (OUTPATIENT)
Dept: SURGERY | Age: 61
End: 2021-03-31
Payer: COMMERCIAL

## 2021-03-31 ENCOUNTER — OFFICE VISIT (OUTPATIENT)
Dept: SURGERY | Age: 61
End: 2021-03-31
Payer: COMMERCIAL

## 2021-03-31 VITALS
HEIGHT: 66 IN | SYSTOLIC BLOOD PRESSURE: 139 MMHG | DIASTOLIC BLOOD PRESSURE: 85 MMHG | WEIGHT: 118 LBS | HEART RATE: 79 BPM | BODY MASS INDEX: 18.96 KG/M2

## 2021-03-31 DIAGNOSIS — R11.0 CHRONIC NAUSEA: ICD-10-CM

## 2021-03-31 DIAGNOSIS — K55.1 MESENTERIC ARTERY STENOSIS (HCC): ICD-10-CM

## 2021-03-31 DIAGNOSIS — I71.40 AAA (ABDOMINAL AORTIC ANEURYSM) WITHOUT RUPTURE: Primary | ICD-10-CM

## 2021-03-31 DIAGNOSIS — I72.3 ILIAC ARTERY ANEURYSM, RIGHT (HCC): ICD-10-CM

## 2021-03-31 DIAGNOSIS — I71.40 ABDOMINAL AORTIC ANEURYSM (AAA) WITHOUT RUPTURE: Primary | ICD-10-CM

## 2021-03-31 DIAGNOSIS — Z72.0 TOBACCO ABUSE: ICD-10-CM

## 2021-03-31 PROCEDURE — 93978 VASCULAR STUDY: CPT | Performed by: SURGERY

## 2021-03-31 PROCEDURE — G8420 CALC BMI NORM PARAMETERS: HCPCS | Performed by: NURSE PRACTITIONER

## 2021-03-31 PROCEDURE — G8427 DOCREV CUR MEDS BY ELIG CLIN: HCPCS | Performed by: NURSE PRACTITIONER

## 2021-03-31 PROCEDURE — 99214 OFFICE O/P EST MOD 30 MIN: CPT | Performed by: NURSE PRACTITIONER

## 2021-03-31 PROCEDURE — G8484 FLU IMMUNIZE NO ADMIN: HCPCS | Performed by: NURSE PRACTITIONER

## 2021-03-31 PROCEDURE — 3017F COLORECTAL CA SCREEN DOC REV: CPT | Performed by: NURSE PRACTITIONER

## 2021-03-31 PROCEDURE — 4004F PT TOBACCO SCREEN RCVD TLK: CPT | Performed by: NURSE PRACTITIONER

## 2021-03-31 RX ORDER — ONDANSETRON 4 MG/1
TABLET, ORALLY DISINTEGRATING ORAL
COMMUNITY
Start: 2021-03-10 | End: 2021-09-13

## 2021-03-31 NOTE — PROGRESS NOTES
3/31/21    Chief Complaint   Patient presents with    New Patient     Ref by Dr. Rima Vidal for Abdominal aortic aneurysm       Pain Assessment  The patient is currently not experiencing any pain at this time. TRENT Choi is a 59 y.o. male who presents with a complaint of Aneurysm evaluation. The Aneurysms are located in the abdominal aorta and right common iliac artery. Patient was referred by Dr. Rima Vidal. The patient reports chronic nausea that led him to multiple tests. The AAA was discovered by CT scan while looking for a GI cause of the nausea; he is hoping that we can find a cause for his chronic nausea. In 2015 when the nausea started, his weight was 129 lbs and is currently down to 118 lbs. He denies postprandial pain, but reports that the nausea continues to be severe. He takes Zofran for relief. At present, he hasn't been able to eat much because he had all of his teeth pulled 6 weeks ago. Contributing factors for development of his aneurysm include family history (grandparents with AAA) and current use of tobacco.  Previous diagnostic tests have included CT scan 3/22/21 (incidental finding of AAA 4.3 cm). Recent diagnostic tests include: vascular scan today (4.18 cm); proximal SMA stenosis 302.1 cm/s. There has been no relevant prior surgery. Review of Systems   Gastrointestinal: Positive for nausea (chronic X5 years). Psychiatric/Behavioral: The patient is nervous/anxious. All other systems reviewed and are negative. Allergies   Allergen Reactions    Chantix [Varenicline]      Agitation    Codeine Nausea Only       Prior to Visit Medications    Medication Sig Taking?  Authorizing Provider   ondansetron (ZOFRAN-ODT) 4 MG disintegrating tablet  Yes Historical Provider, MD   HYDROcodone-acetaminophen (NORCO) 5-325 MG per tablet  Yes Historical Provider, MD   chlorhexidine (PERIDEX) 0.12 % solution   Historical Provider, MD   gabapentin (NEURONTIN) 300 MG capsule Take 1 capsule by mouth 3 times daily for 180 days. Patient not taking: Reported on 3/31/2021  Shelley DO Benjamín       History reviewed. Physical Exam  Constitutional:       General: He is not in acute distress. Appearance: Normal appearance. HENT:      Head: Normocephalic. Right Ear: Hearing normal.      Left Ear: Hearing normal.   Eyes:      General: Lids are normal.      Conjunctiva/sclera: Conjunctivae normal.   Neck:      Musculoskeletal: Normal range of motion and neck supple. Vascular: No carotid bruit. Trachea: Trachea normal. No tracheal deviation. Cardiovascular:      Rate and Rhythm: Normal rate. Rhythm irregular. Pulses:           Popliteal pulses are 2+ on the right side and 2+ on the left side. Dorsalis pedis pulses are 2+ on the right side and 2+ on the left side. Posterior tibial pulses are 2+ on the right side and 2+ on the left side. Heart sounds: Normal heart sounds. Pulmonary:      Effort: Pulmonary effort is normal.      Breath sounds: Normal breath sounds. Abdominal:      Palpations: There is pulsatile mass (AAA measures 4.18 cm x 4.14 cm per vascular scan today; right common iliac artery measures 1.96 cm x 1.90 cm). There is no mass. Tenderness: There is no abdominal tenderness. There is no guarding or rebound. Musculoskeletal: Normal range of motion. Skin:     General: Skin is warm and dry. Neurological:      Mental Status: He is alert and oriented to person, place, and time. Psychiatric:         Mood and Affect: Mood is anxious. Judgment: Judgment normal.         ASSESSMENT/PLAN:     Diagnosis   1. Abdominal aortic aneurysm (AAA) without rupture (HCC) - 4.18 cm x 4.14 cm   2. Iliac artery aneurysm, right (HCC) - 1.96 cm x 1.90 cm   3. Mesenteric artery stenosis (HCC) - proximal SMA velocity 302.1 cm/s   4. Chronic nausea  X5 years   5. Tobacco abuse   The patient was instructed/counseled on smoking cessation. Return in about 6 months (around 9/30/2021) for AAA scan and office visit.

## 2021-03-31 NOTE — PATIENT INSTRUCTIONS
Return in about 6 months (around 9/30/2021) for AAA scan and office visit. PATIENT EDUCATION focused on A&P of aneurysms and strong familial incidence. Patient was informed that smoking and high blood pressure can affect aneurysms. Smoking can thin vessels and high blood pressure results in too much pressure inside the vessel, which can potentially stretch it further. I explained that it is important to make family members aware of the influence of genetic factors in the development of an aneurysm. Try to cut down on smoking the best you can.

## 2021-04-01 ENCOUNTER — TELEPHONE (OUTPATIENT)
Dept: FAMILY MEDICINE CLINIC | Age: 61
End: 2021-04-01

## 2021-04-01 DIAGNOSIS — I71.40 ABDOMINAL AORTIC ANEURYSM (AAA) GREATER THAN 39 MM IN DIAMETER: ICD-10-CM

## 2021-04-01 DIAGNOSIS — K55.1 MESENTERIC ARTERY STENOSIS (HCC): Primary | ICD-10-CM

## 2021-04-01 ASSESSMENT — ENCOUNTER SYMPTOMS: NAUSEA: 1

## 2021-04-01 NOTE — TELEPHONE ENCOUNTER
There are several options to help. Chantix is the most successful. There is also prescription Zyban and OTC nicotine patches and gum.

## 2021-04-01 NOTE — TELEPHONE ENCOUNTER
----- Message from Jerson Dos Santos sent at 4/1/2021  2:55 PM EDT -----  Subject: Message to Provider    QUESTIONS  Information for Provider? patient was at the hospital yesterday and was   told about something he could use to quit smoking but it was not chantix   and he does not remember what it was. he wants to quit. please call him   back  ---------------------------------------------------------------------------  --------------  CALL BACK INFO  What is the best way for the office to contact you? OK to leave message on   voicemail  Preferred Call Back Phone Number? [phone]  ---------------------------------------------------------------------------  --------------  SCRIPT ANSWERS  Relationship to Patient?  Self

## 2021-04-02 RX ORDER — BUPROPION HYDROCHLORIDE 150 MG/1
150 TABLET, EXTENDED RELEASE ORAL 2 TIMES DAILY
Qty: 60 TABLET | Refills: 5 | Status: SHIPPED | OUTPATIENT
Start: 2021-04-02 | End: 2021-04-07

## 2021-04-07 ENCOUNTER — TELEPHONE (OUTPATIENT)
Dept: FAMILY MEDICINE CLINIC | Age: 61
End: 2021-04-07

## 2021-04-07 DIAGNOSIS — Z72.0 TOBACCO ABUSE: Primary | ICD-10-CM

## 2021-04-07 RX ORDER — VARENICLINE TARTRATE
KIT
Qty: 1 BOX | Refills: 0 | Status: SHIPPED
Start: 2021-04-07 | End: 2021-10-07 | Stop reason: CLARIF

## 2021-04-07 NOTE — TELEPHONE ENCOUNTER
Have him stop the Wellbutrin. I would be happy to prescribe Chantix. The most common side effect is strange dreams. Let me know if he would like me to Rx it.

## 2021-04-07 NOTE — TELEPHONE ENCOUNTER
Patient is calling stating he started taking Wellbutrin 2 days ago to help him to quit smoking however he cant sleep now he states he has not slept in two days wanted to know if there is something else he could take please advise

## 2021-04-10 ENCOUNTER — HOSPITAL ENCOUNTER (OUTPATIENT)
Dept: CT IMAGING | Age: 61
Discharge: HOME | End: 2021-04-10
Payer: COMMERCIAL

## 2021-04-10 DIAGNOSIS — K55.1 MESENTERIC ARTERY STENOSIS (HCC): ICD-10-CM

## 2021-04-10 PROCEDURE — 6360000004 HC RX CONTRAST MEDICATION: Performed by: NURSE PRACTITIONER

## 2021-04-10 PROCEDURE — 74174 CTA ABD&PLVS W/CONTRAST: CPT

## 2021-04-10 RX ADMIN — IOPAMIDOL 75 ML: 755 INJECTION, SOLUTION INTRAVENOUS at 08:02

## 2021-05-31 NOTE — PROGRESS NOTES
Subjective:      Patient ID: Elliot Sellers is a 59 y.o. male. HPI     DDD Cervical Spine:  Patient has a history of cervical DDD. He states that cervical traction has helped in the past.  He was previously prescribed Mobic 15 mg daily but it did not help. He has had Physical Therapy with mild improvement. He was previously prescribed Neurontin but stopped it when his dentist prescribed pain medication temporarily. He had an MRI of the cervical spine 2-13-20 showing mild to moderate DDD and mild spinal stenosis and multilevel foraminal narrowing. He was seen by Neurosurgeon (Sarah) and had neck injections which helped considerably. He was referred back to Sarah at his visit on 3-4-21. He was also prescribed Neurontin and titrated to 300 mg TID but is not taking it. He had 2 cervical injections today at [facility]. He is also doing PT. Chronic Low Back Pain:  Patient has chronic left low back pain that radiates to his left leg. It is worse if he is on his feet for prolonged periods. He was previously treated with Mobic (no help) and Physical Therapy (some improvement). He was previously prescribed Neurontin but stopped it when his dentist gave him pain medication temporarily. He had an MRI of the lumbar spine on 2-13-20 showing multilevel DDD and mild canal stenosis at L3-4 and foraminal narrowing at L5-S1. Chronic Nausea:  Patient has a history of chronic intermittent nausea that has not responded to Protonix and Carafate or Zofran. His work-up has included and EGD with biopsy that was normal.  He has also had a normal abdominal CT and Colonoscopy. His amylase and lipase were normal.  He continues to have chronic intermittent nausea. He never vomits with it. He now has some discomfort in the epigastric region. He was referred to Dr. Jay Blum at his visit on 3-4-21 for further evaluation.      Depression: Patient scored a 13 on his depression scale (moderate depression) at his visit on 3-4-21. He is not suicidal.  He states that he is depressed over world news. I discussed treating his depression with him at that time but he wanted to research it first.  He states that he does not want to take medications long term. Tobacco Abuse:  Patient continues to smoke 1 ppd. He previously did not tolerate Chantix due to agitation. He is now trying it a second time. Review of Systems   Constitutional: Negative for chills and fever. Musculoskeletal: Positive for arthralgias and myalgias. Psychiatric/Behavioral: Positive for agitation, decreased concentration, dysphoric mood and sleep disturbance. Negative for suicidal ideas. The patient is nervous/anxious. /74   Pulse 123   Ht 5' 6\" (1.676 m)   Wt 123 lb (55.8 kg)   SpO2 97%   BMI 19.85 kg/m²    Objective:   Physical Exam  Vitals reviewed. Constitutional:       General: He is not in acute distress. Appearance: He is well-developed. HENT:      Head: Normocephalic. Right Ear: External ear normal.      Left Ear: External ear normal.   Neck:      Thyroid: No thyromegaly. Vascular: No carotid bruit or JVD. Cardiovascular:      Rate and Rhythm: Normal rate and regular rhythm. Heart sounds: Normal heart sounds. No murmur heard. Pulmonary:      Effort: Pulmonary effort is normal.      Breath sounds: Normal breath sounds. No wheezing or rales. Abdominal:      General: Abdomen is flat. Bowel sounds are normal. There is no distension. Palpations: Abdomen is soft. There is no mass. Tenderness: There is no abdominal tenderness. There is no guarding or rebound. Hernia: No hernia is present. Musculoskeletal:      Comments: Bilateral hands with no nodules or swelling or finger deviation. Lymphadenopathy:      Cervical: No cervical adenopathy. Neurological:      Mental Status: He is alert and oriented to person, place, and time.          Assessment:      DDD

## 2021-06-01 ENCOUNTER — OFFICE VISIT (OUTPATIENT)
Dept: FAMILY MEDICINE CLINIC | Age: 61
End: 2021-06-01
Payer: COMMERCIAL

## 2021-06-01 VITALS
HEIGHT: 66 IN | BODY MASS INDEX: 19.77 KG/M2 | DIASTOLIC BLOOD PRESSURE: 74 MMHG | SYSTOLIC BLOOD PRESSURE: 128 MMHG | OXYGEN SATURATION: 97 % | WEIGHT: 123 LBS | HEART RATE: 123 BPM

## 2021-06-01 DIAGNOSIS — Z23 NEED FOR SHINGLES VACCINE: ICD-10-CM

## 2021-06-01 DIAGNOSIS — G89.29 CHRONIC LEFT-SIDED LOW BACK PAIN WITH LEFT-SIDED SCIATICA: ICD-10-CM

## 2021-06-01 DIAGNOSIS — F32.4 MAJOR DEPRESSION SINGLE EPISODE, IN PARTIAL REMISSION (HCC): ICD-10-CM

## 2021-06-01 DIAGNOSIS — Z72.0 TOBACCO ABUSE: ICD-10-CM

## 2021-06-01 DIAGNOSIS — R11.0 CHRONIC NAUSEA: ICD-10-CM

## 2021-06-01 DIAGNOSIS — M50.30 DDD (DEGENERATIVE DISC DISEASE), CERVICAL: Primary | ICD-10-CM

## 2021-06-01 DIAGNOSIS — R09.81 CHRONIC NASAL CONGESTION: ICD-10-CM

## 2021-06-01 DIAGNOSIS — M25.541 ARTHRALGIA OF BOTH HANDS: ICD-10-CM

## 2021-06-01 DIAGNOSIS — M25.542 ARTHRALGIA OF BOTH HANDS: ICD-10-CM

## 2021-06-01 DIAGNOSIS — M54.42 CHRONIC LEFT-SIDED LOW BACK PAIN WITH LEFT-SIDED SCIATICA: ICD-10-CM

## 2021-06-01 PROCEDURE — 99214 OFFICE O/P EST MOD 30 MIN: CPT | Performed by: FAMILY MEDICINE

## 2021-06-01 PROCEDURE — G8427 DOCREV CUR MEDS BY ELIG CLIN: HCPCS | Performed by: FAMILY MEDICINE

## 2021-06-01 PROCEDURE — 4004F PT TOBACCO SCREEN RCVD TLK: CPT | Performed by: FAMILY MEDICINE

## 2021-06-01 PROCEDURE — 3017F COLORECTAL CA SCREEN DOC REV: CPT | Performed by: FAMILY MEDICINE

## 2021-06-01 PROCEDURE — G8420 CALC BMI NORM PARAMETERS: HCPCS | Performed by: FAMILY MEDICINE

## 2021-06-01 RX ORDER — ZOSTER VACCINE RECOMBINANT, ADJUVANTED 50 MCG/0.5
0.5 KIT INTRAMUSCULAR ONCE
Qty: 0.5 ML | Refills: 0 | Status: SHIPPED | OUTPATIENT
Start: 2021-06-01 | End: 2021-06-01

## 2021-06-03 ENCOUNTER — OFFICE VISIT (OUTPATIENT)
Dept: ENT CLINIC | Age: 61
End: 2021-06-03
Payer: COMMERCIAL

## 2021-06-03 VITALS
HEART RATE: 88 BPM | HEIGHT: 66 IN | WEIGHT: 123 LBS | DIASTOLIC BLOOD PRESSURE: 80 MMHG | BODY MASS INDEX: 19.77 KG/M2 | SYSTOLIC BLOOD PRESSURE: 124 MMHG

## 2021-06-03 DIAGNOSIS — J34.89 NASAL OBSTRUCTION: Primary | ICD-10-CM

## 2021-06-03 DIAGNOSIS — J34.2 DEVIATED NASAL SEPTUM: ICD-10-CM

## 2021-06-03 DIAGNOSIS — Z72.0 TOBACCO ABUSE: ICD-10-CM

## 2021-06-03 DIAGNOSIS — J31.0 CHRONIC RHINITIS: ICD-10-CM

## 2021-06-03 DIAGNOSIS — J34.3 HYPERTROPHY OF BOTH INFERIOR NASAL TURBINATES: ICD-10-CM

## 2021-06-03 DIAGNOSIS — J32.1 CHRONIC FRONTAL SINUSITIS: ICD-10-CM

## 2021-06-03 PROCEDURE — G8420 CALC BMI NORM PARAMETERS: HCPCS | Performed by: STUDENT IN AN ORGANIZED HEALTH CARE EDUCATION/TRAINING PROGRAM

## 2021-06-03 PROCEDURE — 99244 OFF/OP CNSLTJ NEW/EST MOD 40: CPT | Performed by: STUDENT IN AN ORGANIZED HEALTH CARE EDUCATION/TRAINING PROGRAM

## 2021-06-03 PROCEDURE — G8427 DOCREV CUR MEDS BY ELIG CLIN: HCPCS | Performed by: STUDENT IN AN ORGANIZED HEALTH CARE EDUCATION/TRAINING PROGRAM

## 2021-06-03 PROCEDURE — 31231 NASAL ENDOSCOPY DX: CPT | Performed by: STUDENT IN AN ORGANIZED HEALTH CARE EDUCATION/TRAINING PROGRAM

## 2021-06-03 RX ORDER — IPRATROPIUM BROMIDE 42 UG/1
2 SPRAY, METERED NASAL 4 TIMES DAILY
Qty: 1 BOTTLE | Refills: 3 | Status: SHIPPED
Start: 2021-06-03 | End: 2022-05-26 | Stop reason: CLARIF

## 2021-06-03 RX ORDER — OMEPRAZOLE 40 MG/1
CAPSULE, DELAYED RELEASE ORAL
Qty: 30 CAPSULE | Refills: 1 | Status: SHIPPED
Start: 2021-06-03 | End: 2021-10-07 | Stop reason: CLARIF

## 2021-06-04 NOTE — PROGRESS NOTES
[address] [facility] (:  1960) is a 59 y.o. male, here for evaluation of the following chief complaint(s):  Congestion      ASSESSMENT/PLAN:  1. Nasal obstruction  2. Deviated nasal septum  3. Hypertrophy of both inferior nasal turbinates  4. Chronic rhinitis  5. Tobacco abuse  6. Chronic frontal sinusitis  -     CT SINUS FOR IMAGE GUIDANCE; Future      This is a very pleasant 59 y.o. male here today for evaluation of the the above-noted complaints. Regarding his multiple sinonasal complaints, he provides a history of repeated sinus infections and constant nasal drainage and facial pain and pressure. He has already been treated with multiple rounds of antibiotics without improvement. I would like to him to obtain a CT scan of his sinuses to evaluate for any underlying chronic sinonasal disease. Depending what that shows and we can consider continue medical management and/or surgical options. Regarding his nasal obstruction, chronic rhinitis and inferior turbinate hypertrophy, he may be a candidate for a septoplasty and turbinate reduction. I have also started him on omeprazole to see if this can clear up the phlegm in his throat. Regarding his constant nasal drainage we will prescribe him Atrovent 0.06 to be used up to 4 times daily to see if this helps with the drainage. SUBJECTIVE/OBJECTIVE:  TRENT Baker is here today for evaluation of multiple sinonasal complaints. The patient states that he has constant sinus infections and facial pain and pressure. He has had constant nasal drainage and feels like there is a bunch of phlegm stuck in the back of his throat. He smokes a pack and half cigarettes per day. He is not using any nasal sprays but has tried them in the past and did not think that that helped. He has chronic pain syndrome. He has never had a nasal or sinus surgery.     REVIEW OF SYSTEMS  The following systems were reviewed and revealed the following in addition to any already discussed in the HPI:    PHYSICAL EXAM    GENERAL: No acute distress, alert and oriented, no hoarseness, strong voice  EYES: EOMI, Anti-icteric  HENT:   Head: Normocephalic and atraumatic. Face:  Symmetric, facial nerve intact, no sinus tenderness  Right Ear: Normal external ear, normal external auditory canal, intact tympanic membrane with normal mobility and aerated middle ear  Left Ear: Normal external ear, normal external auditory canal, intact tympanic membrane with normal mobility and aerated middle ear  Mouth/Oral Cavity:  normal lips, Uvula is midline, no mucosal lesions, no trismus, without dentition, normal salivary quality/flow  Oropharynx/Larynx:  normal oropharynx, 1+ tonsils; patient did not tolerate mirror exam due to excessive gag reflex  Nose:Normal external nasal appearance. Anterior rhinoscopy shows  a deviated septum preventing view posteriorly. Enlargement turbinates. Normal mucosa   NECK: Normal range of motion, no thyromegaly, trachea is midline, no lymphadenopathy, no neck masses, no crepitus  CHEST: Normal respiratory effort, no retractions, breathing comfortably  SKIN: No rashes, normal appearing skin, no evidence of skin lesions/tumors  Neuro:  cranial nerve II-XII intact; normal gait  Cardio:  no edema        PROCEDURE  Nasal Endoscopy (CPT code 22971)    Preop: chronic rhinitis  Postop: Same    Verbal consent was received. After topical anesthesia and decongestion had been obtained using aerosolized 1% lidocaine and oxymetazoline, a 45 degree rigid endoscope was placed into both nares with the patient in a sitting position.  The following was observed:    Right Nasal Cavity and Paranasal Sinuses:  Polyp score = 0 (0 = no polyps, 1 = small polyps in middle meatus not reaching below the inferior border of the middle jaz, 2 = polyps reaching below the middle border of the middle turbinate, 3= large polyps reaching the lower border of the inferior turbinate or polyps medial to the middle jaz, 4= large polyps causing almost complete congestion/obstruction of the interior meatus)  Edema score = 1 (0 = absent, 1 = mild, 2 = severe)  Discharge score = 1 (0 = no discharge, 1 = clear thin discharge, 2 = thick purulent discharge)    Left Nasal Cavity and Paranasal Sinuses:    Polyp score = 0 (0 = no polyps, 1 = small polyps in middle meatus not reaching below the inferior border of the middle jaz, 2 = polyps reaching below the middle border of the middle turbinate, 3= large polyps reaching the lower border of the inferior turbinate or polyps medial to the middle jaz, 4= large polyps causing almost complete congestion/obstruction of the interior meatus)  Edema score = 1 (0 = absent, 1 = mild, 2 = severe)  Discharge score = 1 (0 = no discharge, 1 = clear thin discharge, 2 = thick purulent discharge)    Septum: intact and deviated   Other:   -The inferior and middle turbinates were examined. The middle meatus, and sphenoethmoid recess was examined bilaterally.    -There is evidence of mild to moderate sinonasal inflammation with clear secretions bilaterally. Advancement of the scope posteriorly revealed normal-appearing nasopharynx, oropharynx, hypopharynx and larynx. There was no evidence of any mucosal lesions or masses. True vocal cord motion was symmetric.   -There were no complications. Tolerated well without complication. I attest that I was present for and did the entire procedure myself. This note was generated completely or in part utilizing Dragon dictation speech recognition software. Occasionally, words are mistranscribed and despite editing, the text may contain inaccuracies due to incorrect word recognition. If further clarification is needed please contact the office at [phone]. An electronic signature was used to authenticate this note.     --Richi Bass MD

## 2021-06-22 ENCOUNTER — HOSPITAL ENCOUNTER (OUTPATIENT)
Dept: CT IMAGING | Age: 61
Discharge: HOME | End: 2021-06-22
Payer: COMMERCIAL

## 2021-06-22 DIAGNOSIS — J32.1 CHRONIC FRONTAL SINUSITIS: ICD-10-CM

## 2021-06-22 PROCEDURE — 70486 CT MAXILLOFACIAL W/O DYE: CPT

## 2021-06-23 ENCOUNTER — OFFICE VISIT (OUTPATIENT)
Dept: ENT CLINIC | Age: 61
End: 2021-06-23
Payer: COMMERCIAL

## 2021-06-23 VITALS
DIASTOLIC BLOOD PRESSURE: 80 MMHG | BODY MASS INDEX: 20.09 KG/M2 | SYSTOLIC BLOOD PRESSURE: 120 MMHG | HEIGHT: 66 IN | WEIGHT: 125 LBS

## 2021-06-23 DIAGNOSIS — Z72.0 TOBACCO ABUSE: ICD-10-CM

## 2021-06-23 DIAGNOSIS — J34.89 NASAL OBSTRUCTION: Primary | ICD-10-CM

## 2021-06-23 DIAGNOSIS — J34.3 HYPERTROPHY OF BOTH INFERIOR NASAL TURBINATES: ICD-10-CM

## 2021-06-23 DIAGNOSIS — J31.0 CHRONIC RHINITIS: ICD-10-CM

## 2021-06-23 DIAGNOSIS — J34.2 DEVIATED NASAL SEPTUM: ICD-10-CM

## 2021-06-23 PROCEDURE — 99214 OFFICE O/P EST MOD 30 MIN: CPT | Performed by: STUDENT IN AN ORGANIZED HEALTH CARE EDUCATION/TRAINING PROGRAM

## 2021-06-23 PROCEDURE — G8427 DOCREV CUR MEDS BY ELIG CLIN: HCPCS | Performed by: STUDENT IN AN ORGANIZED HEALTH CARE EDUCATION/TRAINING PROGRAM

## 2021-06-23 PROCEDURE — 3017F COLORECTAL CA SCREEN DOC REV: CPT | Performed by: STUDENT IN AN ORGANIZED HEALTH CARE EDUCATION/TRAINING PROGRAM

## 2021-06-23 PROCEDURE — 4004F PT TOBACCO SCREEN RCVD TLK: CPT | Performed by: STUDENT IN AN ORGANIZED HEALTH CARE EDUCATION/TRAINING PROGRAM

## 2021-06-23 PROCEDURE — G8420 CALC BMI NORM PARAMETERS: HCPCS | Performed by: STUDENT IN AN ORGANIZED HEALTH CARE EDUCATION/TRAINING PROGRAM

## 2021-06-23 NOTE — PROGRESS NOTES
[address] [facility] (:  1960) is a 59 y.o. male, here for evaluation of the following chief complaint(s):  Results      ASSESSMENT/PLAN:  1. Nasal obstruction  2. Deviated nasal septum  3. Hypertrophy of both inferior nasal turbinates  4. Chronic rhinitis  5. Tobacco abuse      This is a very pleasant 59 y.o. male here today for evaluation of the the above-noted complaints. Regarding his multiple sinonasal complaints, he provides a history of repeated sinus infections and constant nasal drainage and facial pain and pressure. He has already been treated with multiple rounds of antibiotics without improvement. We obtained a posttreatment CT scan that showed evidence of normally aerated sinuses bilaterally with a rightward septal deviation and enlargement of his left turbinate. I had a lengthy discussion with the patient regarding different medical management options for him as he is not interested in any surgery at this time due to multiple other medical issues he has going on. I think this is very reasonable. I offered to prescribe him fluticasone however he declined at this time. I have asked him to continue to use his Atrovent to see if this helps with his chronic nasal drainage. Should he change his mind he should give us call and we can prescribe him fluticasone. SUBJECTIVE/OBJECTIVE:  TRENT King is here today for evaluation of multiple sinonasal complaints. The patient states that he has constant sinus infections and facial pain and pressure. He has had constant nasal drainage and feels like there is a bunch of phlegm stuck in the back of his throat. He smokes a pack and half cigarettes per day. He is not using any nasal sprays but has tried them in the past and did not think that that helped. He has chronic pain syndrome. He has never had a nasal or sinus surgery.     Update 2020[de-identified]    Patient presents today for follow-up. He has post treatment CT scan which I independently reviewed and showed evidence of normally aerated sinuses bilaterally with a small septal deviation. He is still getting nasal drainage. He is having some trouble breathing through his nose. He recently had an injection in his neck for some chronic neck pain. He is now having some knee pain. REVIEW OF SYSTEMS  The following systems were reviewed and revealed the following in addition to any already discussed in the HPI:    PHYSICAL EXAM    GENERAL: No acute distress, alert and oriented, no hoarseness, strong voice  EYES: EOMI, Anti-icteric  HENT:   Head: Normocephalic and atraumatic. Face:  Symmetric, facial nerve intact, no sinus tenderness  Right Ear: Normal external ear, normal external auditory canal, intact tympanic membrane with normal mobility and aerated middle ear  Left Ear: Normal external ear, normal external auditory canal, intact tympanic membrane with normal mobility and aerated middle ear  Mouth/Oral Cavity:  normal lips, Uvula is midline, no mucosal lesions, no trismus, without dentition, normal salivary quality/flow  Oropharynx/Larynx:  normal oropharynx, 1+ tonsils; patient did not tolerate mirror exam due to excessive gag reflex  Nose:Normal external nasal appearance. Anterior rhinoscopy shows  a deviated septum preventing view posteriorly. Enlargement turbinates. Normal mucosa   NECK: Normal range of motion, no thyromegaly, trachea is midline, no lymphadenopathy, no neck masses, no crepitus  CHEST: Normal respiratory effort, no retractions, breathing comfortably  SKIN: No rashes, normal appearing skin, no evidence of skin lesions/tumors  Neuro:  cranial nerve II-XII intact; normal gait  Cardio:  no edema        PROCEDURE  Nasal Endoscopy (CPT code 04744) from initial visit    Preop: chronic rhinitis  Postop: Same    Verbal consent was received.  After topical anesthesia and decongestion had been obtained using aerosolized 1% lidocaine and oxymetazoline, a 45 degree rigid endoscope was placed into both nares with the patient in a sitting position. The following was observed:    Right Nasal Cavity and Paranasal Sinuses:  Polyp score = 0 (0 = no polyps, 1 = small polyps in middle meatus not reaching below the inferior border of the middle jaz, 2 = polyps reaching below the middle border of the middle turbinate, 3= large polyps reaching the lower border of the inferior turbinate or polyps medial to the middle jaz, 4= large polyps causing almost complete congestion/obstruction of the interior meatus)  Edema score = 1 (0 = absent, 1 = mild, 2 = severe)  Discharge score = 1 (0 = no discharge, 1 = clear thin discharge, 2 = thick purulent discharge)    Left Nasal Cavity and Paranasal Sinuses:    Polyp score = 0 (0 = no polyps, 1 = small polyps in middle meatus not reaching below the inferior border of the middle jaz, 2 = polyps reaching below the middle border of the middle turbinate, 3= large polyps reaching the lower border of the inferior turbinate or polyps medial to the middle jaz, 4= large polyps causing almost complete congestion/obstruction of the interior meatus)  Edema score = 1 (0 = absent, 1 = mild, 2 = severe)  Discharge score = 1 (0 = no discharge, 1 = clear thin discharge, 2 = thick purulent discharge)    Septum: intact and deviated   Other:   -The inferior and middle turbinates were examined. The middle meatus, and sphenoethmoid recess was examined bilaterally.    -There is evidence of mild to moderate sinonasal inflammation with clear secretions bilaterally. Advancement of the scope posteriorly revealed normal-appearing nasopharynx, oropharynx, hypopharynx and larynx. There was no evidence of any mucosal lesions or masses. True vocal cord motion was symmetric.   -There were no complications. Tolerated well without complication.  I attest that I was present for and did the entire procedure myself. This note was generated completely or in part utilizing Dragon dictation speech recognition software. Occasionally, words are mistranscribed and despite editing, the text may contain inaccuracies due to incorrect word recognition. If further clarification is needed please contact the office at [phone]. An electronic signature was used to authenticate this note.     --Kaylen Gonzalez MD

## 2021-07-21 ENCOUNTER — TELEPHONE (OUTPATIENT)
Dept: FAMILY MEDICINE CLINIC | Age: 61
End: 2021-07-21

## 2021-07-21 NOTE — TELEPHONE ENCOUNTER
Pt called to ask Dr Radha Barakat about seeing or speaking with someone before starting on depression med's. He would like to talk with a psyc doctor. He also called to let Dr Radha Baraakt know that the rheumatologist can not get him in until September. He was told to check with his insurance. His knees turned purple what should he do?   Please give pt a call [phone]

## 2021-07-21 NOTE — TELEPHONE ENCOUNTER
Pt given the following information:    Chely Roberts Psy.D.   [address]  [address] [facility], [address]  [phone] (fax)      Or      Alejandro Staley, [address] [facility] [address], [address]  [phone]      For a different Rheumatologist, he can try:     Dr. Hannah Andrews MD  [address] [place], [place]   [phone]

## 2021-07-21 NOTE — TELEPHONE ENCOUNTER
For Psychologist, I would recommend:    Blessing Moreno Psy.D. [address]  [address] [facility], [address]  [phone] (fax)     Or     Vaibhav Herbert, [address] [place] [address] [address] [facility], [address]  [phone]     For a different Rheumatologist, he can try:    Dr. Peter Gomez MD  [address] [place] [address]   [phone]      I would need to see him to evaluate his knee discoloration.

## 2021-08-16 ENCOUNTER — TELEPHONE (OUTPATIENT)
Dept: FAMILY MEDICINE CLINIC | Age: 61
End: 2021-08-16

## 2021-08-16 DIAGNOSIS — M79.641 BILATERAL HAND PAIN: Primary | ICD-10-CM

## 2021-08-16 DIAGNOSIS — F32.4 MAJOR DEPRESSION SINGLE EPISODE, IN PARTIAL REMISSION (HCC): ICD-10-CM

## 2021-08-16 DIAGNOSIS — M79.642 BILATERAL HAND PAIN: Primary | ICD-10-CM

## 2021-08-16 NOTE — TELEPHONE ENCOUNTER
I have printed referrals for Rheumatology and Psychology as requested. Please confirm the fax numbers with patient and fax them.

## 2021-08-16 NOTE — TELEPHONE ENCOUNTER
----- Message from Chalo Avery sent at 8/16/2021  2:49 PM EDT -----  Subject: Message to Provider    QUESTIONS  Information for Provider? Patient is needing referrals sent to psyhologist   and rhuemotology the ones he got before was covered by insurance. Chai fax number is [phone] and psychology is in [facility]   number. Needs referals sent to these places. ---------------------------------------------------------------------------  --------------  [place] INFO  What is the best way for the office to contact you? Do not leave any   message, patient will call back for answer  Preferred Call Back Phone Number? [phone]  ---------------------------------------------------------------------------  --------------  SCRIPT ANSWERS  Relationship to Patient?  Self

## 2021-08-26 ENCOUNTER — TELEPHONE (OUTPATIENT)
Dept: FAMILY MEDICINE CLINIC | Age: 61
End: 2021-08-26

## 2021-08-26 NOTE — TELEPHONE ENCOUNTER
Thanks for taking the time to speak with him and give him outreach options. I have nothing more to add.

## 2021-08-26 NOTE — TELEPHONE ENCOUNTER
Spoke with pt for 20 mins about situations going on. States he needs to see someone right now. Advised pt that if he needs to be seen urgently he should go to the ED. He stated that he just has a lot on his plate with his wife and daughter. Gave the pt several numbers to call for care for himself. Also gave him some numbers for COA for wife, but states she will not cooperate with them. Wife may leave the household,but not sure. For his daughter advised pt to make appt with PCP and let them know what is going on. If she would prefer a female doc he can call [phone] to switch providers, but to start at her current PCP office. That if she becomes harmful to self or others to call [phone]. Pt was feeling like nobody is helping him they are giving him more numbers to call. Advised him that I am here to help with as much as I can. He can also call Dr Elgin Kiser office if nothing plans out. I will see if I have something more and see if Dr Rachel Giles suggest anything.

## 2021-08-27 ENCOUNTER — HOSPITAL ENCOUNTER (OUTPATIENT)
Dept: GENERAL RADIOLOGY | Age: 61
Discharge: HOME | End: 2021-08-27
Payer: COMMERCIAL

## 2021-08-27 ENCOUNTER — HOSPITAL ENCOUNTER (OUTPATIENT)
Dept: MRI IMAGING | Age: 61
Discharge: HOME | End: 2021-08-27
Payer: COMMERCIAL

## 2021-08-27 ENCOUNTER — HOSPITAL ENCOUNTER (OUTPATIENT)
Age: 61
Discharge: HOME | End: 2021-08-27
Payer: COMMERCIAL

## 2021-08-27 DIAGNOSIS — M47.812 CERVICAL SPONDYLOSIS WITHOUT MYELOPATHY: ICD-10-CM

## 2021-08-27 DIAGNOSIS — M47.812 OSTEOARTHRITIS OF CERVICAL SPINE, UNSPECIFIED SPINAL OSTEOARTHRITIS COMPLICATION STATUS: ICD-10-CM

## 2021-08-27 PROCEDURE — 72050 X-RAY EXAM NECK SPINE 4/5VWS: CPT

## 2021-08-27 PROCEDURE — 72141 MRI NECK SPINE W/O DYE: CPT

## 2021-09-13 ENCOUNTER — TELEPHONE (OUTPATIENT)
Dept: FAMILY MEDICINE CLINIC | Age: 61
End: 2021-09-13

## 2021-09-13 NOTE — TELEPHONE ENCOUNTER
Patient called voicing frustration over not being able to find a psychologist to help him with his current issues. According to our staff we have provided him with various phone numbers to reach out for mental health assistance. Upon further speaking with hCaya Oliver he has stated he does not know where to turn due to a disabled wife who will not tell him what is wrong and a teenage daughter who is also going through issues but will not tell him the root cause. He is also in physical pain but does not have an appt with Dr. Jai Huizar until a week from today. He said Dr. Karolina Clark offered depression medications but at the time did not want to take them, but after further discussion he would be open to trying them until he can get in with someone (I stressed reaching out to [facility]). He agreed that sounded like a good plan and would try that. If he could not get some additional guidance, he would reach back out to me as I gave him my name. Dr. Jevon York could you please consider medication to meet his needs at this time?

## 2021-10-07 ENCOUNTER — PROCEDURE VISIT (OUTPATIENT)
Dept: SURGERY | Age: 61
End: 2021-10-07
Payer: COMMERCIAL

## 2021-10-07 ENCOUNTER — OFFICE VISIT (OUTPATIENT)
Dept: SURGERY | Age: 61
End: 2021-10-07
Payer: COMMERCIAL

## 2021-10-07 VITALS
DIASTOLIC BLOOD PRESSURE: 88 MMHG | BODY MASS INDEX: 19.77 KG/M2 | HEIGHT: 66 IN | WEIGHT: 123 LBS | SYSTOLIC BLOOD PRESSURE: 146 MMHG

## 2021-10-07 DIAGNOSIS — I72.3 ILIAC ARTERY ANEURYSM, RIGHT (HCC): ICD-10-CM

## 2021-10-07 DIAGNOSIS — R11.0 CHRONIC NAUSEA: ICD-10-CM

## 2021-10-07 DIAGNOSIS — I71.40 AAA (ABDOMINAL AORTIC ANEURYSM) WITHOUT RUPTURE: Primary | ICD-10-CM

## 2021-10-07 DIAGNOSIS — Z72.0 TOBACCO ABUSE: ICD-10-CM

## 2021-10-07 DIAGNOSIS — I71.40 ABDOMINAL AORTIC ANEURYSM (AAA) WITHOUT RUPTURE: Primary | ICD-10-CM

## 2021-10-07 DIAGNOSIS — K55.1 MESENTERIC ARTERY STENOSIS (HCC): ICD-10-CM

## 2021-10-07 PROCEDURE — 4004F PT TOBACCO SCREEN RCVD TLK: CPT | Performed by: NURSE PRACTITIONER

## 2021-10-07 PROCEDURE — 3017F COLORECTAL CA SCREEN DOC REV: CPT | Performed by: NURSE PRACTITIONER

## 2021-10-07 PROCEDURE — G8484 FLU IMMUNIZE NO ADMIN: HCPCS | Performed by: NURSE PRACTITIONER

## 2021-10-07 PROCEDURE — G8420 CALC BMI NORM PARAMETERS: HCPCS | Performed by: NURSE PRACTITIONER

## 2021-10-07 PROCEDURE — G8427 DOCREV CUR MEDS BY ELIG CLIN: HCPCS | Performed by: NURSE PRACTITIONER

## 2021-10-07 PROCEDURE — 93978 VASCULAR STUDY: CPT | Performed by: SURGERY

## 2021-10-07 PROCEDURE — 99214 OFFICE O/P EST MOD 30 MIN: CPT | Performed by: NURSE PRACTITIONER

## 2021-10-07 ASSESSMENT — ENCOUNTER SYMPTOMS: NAUSEA: 1

## 2021-10-07 NOTE — PATIENT INSTRUCTIONS
Return in about 6 months (around 4/7/2022) for AAA scan and office visit. PATIENT EDUCATION focused on A&P of aneurysms and strong familial incidence. Patient was informed that smoking and high blood pressure can affect aneurysms. Smoking can thin vessels and high blood pressure results in too much pressure inside the vessel, which can potentially stretch it further. I explained that it is important to make family members aware of the influence of genetic factors in the development of an aneurysm.

## 2021-10-07 NOTE — LETTER
[address] [facility]  [facility] [address]  [facility] [address]  Phone: [phone]  Fax: [phone]    YADI Benson CNP        October 7, 2021      Oumar Beauchamp, [address] [place] [address] [address]     Patient: Janet Oviedo    MR Number: <[id number]>    YOB: 1960    Date of Visit: 10/7/2021        Dear Oumar Beauchamp:    I saw your patient Mary Jo Fletcher, in the office for surveillance of an abdominal aortic aneurysm. His ultrasound showed no significant change in the size of his aneurysm. I have asked the patient to follow up in 6 months with a repeat ultrasound. I will keep you posted regarding this patient's progress.     Sincerely,        YADI Benson CNP

## 2021-10-07 NOTE — PROGRESS NOTES
3/31/21    Chief Complaint   Patient presents with    Circulatory Problem     6 month FU on AAA with scan and office visit. Pain Assessment  The patient is currently not experiencing any pain at this time. TRENT Love is a 59 y.o. male who presents with a complaint of Aneurysm follow up. The Aneurysms are located in the abdominal aorta and right common iliac. Patient was last seen 3/31/21. Previous measurement was 4.18 cm x 4.14 cm cm per duplex scan at last evaluation. Current measurement is 4.14 cm x 3.70 cm per duplex scan. Right common iliac measures 1.65 cm. SMA stenosis is 358.2/46.1 cm/s. The patient denies postprandial pain, but continues to experience nausea at different times throughout the day, but never associated with eating. Contributing factors of AAA include family history (grandfather had cerebral aneurysm, grandmother also had an aneurysm, not sure of location) and current use of tobacco.  Previous diagnostic tests have included CT scan and vascular scan. Recent diagnostic tests include: vascular scan. There has been no relevant prior surgery. Review of Systems   Gastrointestinal: Positive for nausea (chronic X5.5 years). Musculoskeletal: Positive for neck pain (getting injections in a few weeks per Dr. Hernan Sprague). Psychiatric/Behavioral: The patient is nervous/anxious. All other systems reviewed and are negative. Allergies   Allergen Reactions    Codeine Nausea Only    Wellbutrin [Bupropion]      Insomnia        Prior to Visit Medications    Medication Sig Taking? Authorizing Provider   sertraline (ZOLOFT) 50 MG tablet Take 1 tablet by mouth daily Yes Alexa Rizzo,    ipratropium (ATROVENT) 0.06 % nasal spray 2 sprays by Each Nostril route 4 times daily Yes Susi Mcqueen MD       History reviewed. Physical Exam  Constitutional:       General: He is not in acute distress. Appearance: Normal appearance. HENT:      Head: Normocephalic. Right Ear: Hearing normal.      Left Ear: Hearing normal.   Eyes:      General: Lids are normal.      Conjunctiva/sclera: Conjunctivae normal.   Neck:      Vascular: No carotid bruit. Trachea: Trachea normal. No tracheal deviation. Cardiovascular:      Rate and Rhythm: Normal rate and regular rhythm. Pulses:           Popliteal pulses are 2+ on the right side and 2+ on the left side. Dorsalis pedis pulses are 2+ on the right side and 2+ on the left side. Posterior tibial pulses are 2+ on the right side and 2+ on the left side. Heart sounds: Normal heart sounds. Pulmonary:      Effort: Pulmonary effort is normal.      Breath sounds: Normal breath sounds. Abdominal:      Palpations: There is pulsatile mass (AAA measures 4.14 cm x 3.70 cm per vascular scan today; right common iliac artery measures 1.65 cm). There is no mass. Tenderness: There is no abdominal tenderness. There is no guarding or rebound. Musculoskeletal:         General: Normal range of motion. Cervical back: Normal range of motion and neck supple. Skin:     General: Skin is warm and dry. Neurological:      Mental Status: He is alert and oriented to person, place, and time. Psychiatric:         Mood and Affect: Mood is anxious. Judgment: Judgment normal.         ASSESSMENT/PLAN:     Diagnosis   1. Abdominal aortic aneurysm (AAA) without rupture (HCC) - 4.14 cm x 3.70 cm   2. Iliac artery aneurysm, right (HCC) - 1.65 cm   3. Mesenteric artery stenosis (HCC) - proximal SMA velocity 358.2/46.1 cm/s   4. Chronic nausea  X5.5 years   5. Tobacco abuse   The patient was instructed/counseled on smoking cessation. PATIENT EDUCATION focused on A&P of aneurysms and strong familial incidence. Patient was informed that smoking and high blood pressure can affect aneurysms.   Smoking can thin vessels and high blood pressure results in too much pressure inside the vessel, which can potentially stretch it further. I explained that it is important to make family members aware of the influence of genetic factors in the development of an aneurysm. Return in about 6 months (around 4/7/2022) for AAA scan and office visit.

## 2021-11-10 ENCOUNTER — TELEPHONE (OUTPATIENT)
Dept: FAMILY MEDICINE CLINIC | Age: 61
End: 2021-11-10

## 2021-11-10 DIAGNOSIS — A08.4 VIRAL GASTROENTERITIS: Primary | ICD-10-CM

## 2021-11-10 RX ORDER — PROMETHAZINE HYDROCHLORIDE 25 MG/1
25 TABLET ORAL 4 TIMES DAILY PRN
Qty: 20 TABLET | Refills: 0 | Status: SHIPPED | OUTPATIENT
Start: 2021-11-10 | End: 2021-11-17

## 2021-11-10 RX ORDER — DIPHENOXYLATE HYDROCHLORIDE AND ATROPINE SULFATE 2.5; .025 MG/1; MG/1
1 TABLET ORAL 4 TIMES DAILY PRN
Qty: 20 TABLET | Refills: 0 | Status: SHIPPED | OUTPATIENT
Start: 2021-11-10 | End: 2021-11-15

## 2021-11-10 NOTE — TELEPHONE ENCOUNTER
Patient states he is having N/V/D, pain in his back, patient is having gas with little bits of stool. States his rectum is bleeding from wiping too much. States this started 7-8 years ago but now it has flared up. States he has no appetite. Please return call ASAP.

## 2021-11-10 NOTE — TELEPHONE ENCOUNTER
He has a history of chronic intermittent nausea, but the diarrhea is new. He likely has a viral gastroenteritis. I have sent an Rx for Phenergan for nausea and Lomotil for diarrhea to [facility]. The main thing is to not get dehydrated, so make sure he is drinking fluids. If his condition worsens, he may need to go to ER for IV fluids.

## 2022-03-08 NOTE — TELEPHONE ENCOUNTER
Patients insurance will not cover dr. Flako Painting but it will cover dr. Shahida Bonner with [facility] [place]. Please put in referral to this doctor.  Please call patient back when done no

## 2022-05-26 ENCOUNTER — PROCEDURE VISIT (OUTPATIENT)
Dept: SURGERY | Age: 62
End: 2022-05-26
Payer: COMMERCIAL

## 2022-05-26 ENCOUNTER — OFFICE VISIT (OUTPATIENT)
Dept: VASCULAR SURGERY | Age: 62
End: 2022-05-26
Payer: COMMERCIAL

## 2022-05-26 VITALS
SYSTOLIC BLOOD PRESSURE: 132 MMHG | BODY MASS INDEX: 18.32 KG/M2 | HEIGHT: 66 IN | DIASTOLIC BLOOD PRESSURE: 80 MMHG | WEIGHT: 114 LBS

## 2022-05-26 DIAGNOSIS — Z72.0 TOBACCO ABUSE: ICD-10-CM

## 2022-05-26 DIAGNOSIS — K55.1 MESENTERIC ARTERY STENOSIS (HCC): ICD-10-CM

## 2022-05-26 DIAGNOSIS — I72.3 ILIAC ARTERY ANEURYSM, RIGHT (HCC): ICD-10-CM

## 2022-05-26 DIAGNOSIS — I71.40 ABDOMINAL AORTIC ANEURYSM (AAA) WITHOUT RUPTURE: Primary | ICD-10-CM

## 2022-05-26 PROCEDURE — G8419 CALC BMI OUT NRM PARAM NOF/U: HCPCS | Performed by: NURSE PRACTITIONER

## 2022-05-26 PROCEDURE — 99214 OFFICE O/P EST MOD 30 MIN: CPT | Performed by: NURSE PRACTITIONER

## 2022-05-26 PROCEDURE — 3017F COLORECTAL CA SCREEN DOC REV: CPT | Performed by: NURSE PRACTITIONER

## 2022-05-26 PROCEDURE — 4004F PT TOBACCO SCREEN RCVD TLK: CPT | Performed by: NURSE PRACTITIONER

## 2022-05-26 PROCEDURE — G8427 DOCREV CUR MEDS BY ELIG CLIN: HCPCS | Performed by: NURSE PRACTITIONER

## 2022-05-27 PROCEDURE — 93978 VASCULAR STUDY: CPT | Performed by: SURGERY

## 2022-05-30 NOTE — PROGRESS NOTES
Subjective:      Patient ID: Laverne Enriquez is a 57 y.o. male. HPI     DDD Cervical Spine:  Patient has a history of cervical DDD. He states that cervical traction has helped in the past.  He was previously prescribed Mobic 15 mg daily but it did not help. He has had Physical Therapy with mild improvement. He was previously prescribed Neurontin but stopped it when his dentist prescribed pain medication temporarily. He had an MRI of the cervical spine 2-13-20 showing mild to moderate DDD and mild spinal stenosis and multilevel foraminal narrowing. He was seen by Neurosurgeon (Sarah) and had neck injections which helped considerably. He has also had PT. He was offered anterior cervical discectomy but opted for epidural injection. He will be following up with Dr. Mona Gautam tomorrow. Chronic Low Back Pain:  Patient has chronic left low back pain that radiates to his left leg. It is worse if he is on his feet for prolonged periods. He was previously treated with Mobic (no help) and Physical Therapy (some improvement). He was previously prescribed Neurontin but stopped it when his dentist gave him pain medication temporarily. He had an MRI of the lumbar spine on 2-13-20 showing multilevel DDD and mild canal stenosis at L3-4 and foraminal narrowing at L5-S1. Chronic Nausea:  Patient has a history of chronic intermittent nausea that has not responded to Protonix and Carafate or Zofran. His work-up has included and EGD with biopsy that was normal.  He has also had a normal abdominal CT and Colonoscopy. His amylase and lipase were normal.  He continues to have chronic intermittent nausea. He never vomits with it. I had previously offered treatment with Elavil but he declined. He was referred to Dr. Jefferson Del Valle at his visit on 3-4-21 for further evaluation. He is now seeing Dr. Chele Pelletier and is being tested for possible Celiac Artery Disease.        Depression: Patient previously scored a 15 on his depression scale (moderate depression) at his visit on 3-4-21. We discussed treatment at that time with Cymbalta to also help with chronic pain, but he did not want to take medications long term. Post Nasal Drip:  Patient has a chronic history of PND and complains that he is unable to use his dentures due to the drainage. He has been evaluated by Dr. Antonieta Malagon and had a sinus CT that showed normal aeration. He has not responded to Astelin, Flonase, or oral antihistamines. He would like to see an allergist.      Joint Pain:  Patient complains of almost daily pain in the bilateral hips and bilateral knees over the last few months. He denies any known injury. There is morning stiffness as well. Tobacco Abuse:  Patient continues to smoke 1 ppd. He previously did not tolerate Chantix due to agitation. Review of Systems   Constitutional: Negative for chills and fever. Musculoskeletal: Positive for arthralgias and myalgias. Psychiatric/Behavioral: Positive for agitation, decreased concentration, dysphoric mood and sleep disturbance. Negative for suicidal ideas. The patient is nervous/anxious. BP Readings from Last 3 Encounters:   06/06/22 (!) 152/94   05/26/22 132/80   10/07/21 (!) 146/88        /80   Ht 5' 6\" (1.676 m)   Wt 118 lb (53.5 kg)   BMI 19.05 kg/m²    BP (!) 152/94   Ht 5' 6\" (1.676 m)   Wt 118 lb (53.5 kg)   BMI 19.05 kg/m²    Objective:   Physical Exam  Vitals reviewed. Constitutional:       General: He is not in acute distress. Appearance: He is well-developed. HENT:      Head: Normocephalic. Right Ear: External ear normal.      Left Ear: External ear normal.   Neck:      Thyroid: No thyromegaly. Vascular: No carotid bruit or JVD. Cardiovascular:      Rate and Rhythm: Normal rate and regular rhythm. Heart sounds: Normal heart sounds. No murmur heard.       Pulmonary:      Effort: Pulmonary effort is normal.      Breath sounds: Normal breath sounds. No wheezing or rales. Abdominal:      General: Abdomen is flat. Bowel sounds are normal. There is no distension. Palpations: Abdomen is soft. There is no mass. Tenderness: There is no abdominal tenderness. There is no guarding or rebound. Hernia: No hernia is present. Lymphadenopathy:      Cervical: No cervical adenopathy. Neurological:      Mental Status: He is alert and oriented to person, place, and time. Assessment:      DDD Cervical  Chronic Low Back Pain  Chronic Nausea  Depression   Post Nasal Drip  Tobacco Abuse       Plan:       Chem 7, Lipid Panel, PSA  Referral given to [address] 20 shot Postponed   Rx given for Shingrix shot at the pharmacy. I recommended the COVID vaccines     Patient was strongly advised to quit smoking.      RTO as needed         [address],

## 2022-05-31 ASSESSMENT — ENCOUNTER SYMPTOMS: BACK PAIN: 1

## 2022-05-31 NOTE — PROGRESS NOTES
3/31/21    Chief Complaint   Patient presents with    Circulatory Problem     FU on aneurysms and mesenteric stenosis       Pain Assessment  Anirudh Arango has a pain level on 0/10 scale:  6  Location:  Right shoulder, neck and right hip  Description:  aching  Time:  intermittent    HPI     Deidra Bradshaw is a 57 y.o. male who presents with a complaint of Aneurysm follow up. The Aneurysms are located in the abdominal aorta and right common iliac. Patient was last seen 10/7/21. Previous measurement was 4.14 cm x 3.70 cm per duplex scan at last evaluation. Current measurement is 4.5 cm x 4.4 cm per duplex scan. Right common iliac measures 1.64 cm. SMA stenosis has decreased from 358.2/46.1 cm/s to 289.7/38.2 cm/s. The patient denies postprandial pain, but he does have pressure in his abdomen most of the time. He stated that he used to be nauseated throughout the day, but that was replaced with this pressure in his epigastric region. His weight was 118 lbs on 3/21, 123 lbs on 10/21 and 114 lbs today. He also reports that his sinuses \"run\" continuously causing him not to wear his dentures. Without the dentures, he has a hard time eating. He also reports that all of his stomach problems started 10 years ago when he had a tooth abscess and had to take antibiotics. Contributing factors of AAA include family history (grandfather had cerebral aneurysm, grandmother also had an aneurysm, not sure of location) and current use of tobacco.  Previous diagnostic tests have included CT scan and vascular scan. Recent diagnostic tests include: vascular scan. There has been no relevant prior surgery. Review of Systems   Gastrointestinal: Nausea: Hx of nausea X10 yrs. Musculoskeletal: Positive for arthralgias, back pain (gets injections in upper & lower back) and neck pain. Psychiatric/Behavioral: The patient is nervous/anxious. All other systems reviewed and are negative.       Allergies   Allergen Reactions    pressure results in too much pressure inside the vessel, which can potentially stretch it further. I explained that it is important to make family members aware of the influence of genetic factors in the development of an aneurysm. PLAN:    Followup with Dr. Jo Baird regarding SMA and celiac trunk stenosis as a possible cause for stomach discomfort.

## 2022-06-06 ENCOUNTER — OFFICE VISIT (OUTPATIENT)
Dept: FAMILY MEDICINE CLINIC | Age: 62
End: 2022-06-06
Payer: COMMERCIAL

## 2022-06-06 VITALS
BODY MASS INDEX: 18.96 KG/M2 | DIASTOLIC BLOOD PRESSURE: 80 MMHG | WEIGHT: 118 LBS | HEIGHT: 66 IN | SYSTOLIC BLOOD PRESSURE: 116 MMHG

## 2022-06-06 DIAGNOSIS — R11.0 CHRONIC NAUSEA: ICD-10-CM

## 2022-06-06 DIAGNOSIS — Z12.5 SCREENING FOR PROSTATE CANCER: ICD-10-CM

## 2022-06-06 DIAGNOSIS — Z72.0 TOBACCO ABUSE: ICD-10-CM

## 2022-06-06 DIAGNOSIS — M50.30 DDD (DEGENERATIVE DISC DISEASE), CERVICAL: Primary | ICD-10-CM

## 2022-06-06 DIAGNOSIS — F32.4 MAJOR DEPRESSION SINGLE EPISODE, IN PARTIAL REMISSION (HCC): ICD-10-CM

## 2022-06-06 DIAGNOSIS — Z23 NEED FOR SHINGLES VACCINE: ICD-10-CM

## 2022-06-06 DIAGNOSIS — R09.82 POST-NASAL DRIP: ICD-10-CM

## 2022-06-06 DIAGNOSIS — M54.42 CHRONIC LEFT-SIDED LOW BACK PAIN WITH LEFT-SIDED SCIATICA: ICD-10-CM

## 2022-06-06 DIAGNOSIS — Z00.00 PREVENTATIVE HEALTH CARE: ICD-10-CM

## 2022-06-06 DIAGNOSIS — G89.29 CHRONIC LEFT-SIDED LOW BACK PAIN WITH LEFT-SIDED SCIATICA: ICD-10-CM

## 2022-06-06 DIAGNOSIS — Z23 NEED FOR PNEUMOCOCCAL VACCINATION: ICD-10-CM

## 2022-06-06 LAB
ANION GAP SERPL CALCULATED.3IONS-SCNC: 18 MMOL/L (ref 3–16)
BUN BLDV-MCNC: 8 MG/DL (ref 7–20)
CALCIUM SERPL-MCNC: 9.1 MG/DL (ref 8.3–10.6)
CHLORIDE BLD-SCNC: 103 MMOL/L (ref 99–110)
CHOLESTEROL, TOTAL: 206 MG/DL (ref 0–199)
CO2: 21 MMOL/L (ref 21–32)
CREAT SERPL-MCNC: 1 MG/DL (ref 0.8–1.3)
GFR AFRICAN AMERICAN: >60
GFR NON-AFRICAN AMERICAN: >60
GLUCOSE BLD-MCNC: 96 MG/DL (ref 70–99)
HDLC SERPL-MCNC: 48 MG/DL (ref 40–60)
LDL CHOLESTEROL CALCULATED: 141 MG/DL
POTASSIUM SERPL-SCNC: 4.5 MMOL/L (ref 3.5–5.1)
PROSTATE SPECIFIC ANTIGEN: 3.26 NG/ML (ref 0–4)
SODIUM BLD-SCNC: 142 MMOL/L (ref 136–145)
TRIGL SERPL-MCNC: 86 MG/DL (ref 0–150)
VLDLC SERPL CALC-MCNC: 17 MG/DL

## 2022-06-06 PROCEDURE — 36415 COLL VENOUS BLD VENIPUNCTURE: CPT | Performed by: FAMILY MEDICINE

## 2022-06-06 PROCEDURE — G8427 DOCREV CUR MEDS BY ELIG CLIN: HCPCS | Performed by: FAMILY MEDICINE

## 2022-06-06 PROCEDURE — 99214 OFFICE O/P EST MOD 30 MIN: CPT | Performed by: FAMILY MEDICINE

## 2022-06-06 PROCEDURE — 3017F COLORECTAL CA SCREEN DOC REV: CPT | Performed by: FAMILY MEDICINE

## 2022-06-06 PROCEDURE — G8420 CALC BMI NORM PARAMETERS: HCPCS | Performed by: FAMILY MEDICINE

## 2022-06-06 PROCEDURE — 4004F PT TOBACCO SCREEN RCVD TLK: CPT | Performed by: FAMILY MEDICINE

## 2022-06-06 RX ORDER — ZOSTER VACCINE RECOMBINANT, ADJUVANTED 50 MCG/0.5
0.5 KIT INTRAMUSCULAR ONCE
Qty: 0.5 ML | Refills: 0 | Status: SHIPPED | OUTPATIENT
Start: 2022-06-06 | End: 2022-06-06

## 2022-06-06 SDOH — ECONOMIC STABILITY: FOOD INSECURITY: WITHIN THE PAST 12 MONTHS, THE FOOD YOU BOUGHT JUST DIDN'T LAST AND YOU DIDN'T HAVE MONEY TO GET MORE.: NEVER TRUE

## 2022-06-06 SDOH — ECONOMIC STABILITY: FOOD INSECURITY: WITHIN THE PAST 12 MONTHS, YOU WORRIED THAT YOUR FOOD WOULD RUN OUT BEFORE YOU GOT MONEY TO BUY MORE.: NEVER TRUE

## 2022-06-06 ASSESSMENT — PATIENT HEALTH QUESTIONNAIRE - PHQ9
3. TROUBLE FALLING OR STAYING ASLEEP: 2
SUM OF ALL RESPONSES TO PHQ9 QUESTIONS 1 & 2: 2
SUM OF ALL RESPONSES TO PHQ QUESTIONS 1-9: 8
1. LITTLE INTEREST OR PLEASURE IN DOING THINGS: 1
9. THOUGHTS THAT YOU WOULD BE BETTER OFF DEAD, OR OF HURTING YOURSELF: 0
SUM OF ALL RESPONSES TO PHQ QUESTIONS 1-9: 8
10. IF YOU CHECKED OFF ANY PROBLEMS, HOW DIFFICULT HAVE THESE PROBLEMS MADE IT FOR YOU TO DO YOUR WORK, TAKE CARE OF THINGS AT HOME, OR GET ALONG WITH OTHER PEOPLE: 1
8. MOVING OR SPEAKING SO SLOWLY THAT OTHER PEOPLE COULD HAVE NOTICED. OR THE OPPOSITE, BEING SO FIGETY OR RESTLESS THAT YOU HAVE BEEN MOVING AROUND A LOT MORE THAN USUAL: 0
5. POOR APPETITE OR OVEREATING: 2
2. FEELING DOWN, DEPRESSED OR HOPELESS: 1
7. TROUBLE CONCENTRATING ON THINGS, SUCH AS READING THE NEWSPAPER OR WATCHING TELEVISION: 0
6. FEELING BAD ABOUT YOURSELF - OR THAT YOU ARE A FAILURE OR HAVE LET YOURSELF OR YOUR FAMILY DOWN: 0
SUM OF ALL RESPONSES TO PHQ QUESTIONS 1-9: 8
SUM OF ALL RESPONSES TO PHQ QUESTIONS 1-9: 8
4. FEELING TIRED OR HAVING LITTLE ENERGY: 2

## 2022-06-06 ASSESSMENT — SOCIAL DETERMINANTS OF HEALTH (SDOH): HOW HARD IS IT FOR YOU TO PAY FOR THE VERY BASICS LIKE FOOD, HOUSING, MEDICAL CARE, AND HEATING?: NOT HARD AT ALL

## 2022-06-13 ENCOUNTER — INITIAL CONSULT (OUTPATIENT)
Dept: VASCULAR SURGERY | Age: 62
End: 2022-06-13
Payer: COMMERCIAL

## 2022-06-13 ENCOUNTER — HOSPITAL ENCOUNTER (EMERGENCY)
Age: 62
Discharge: HOME | End: 2022-06-13

## 2022-06-13 VITALS — HEIGHT: 66 IN | BODY MASS INDEX: 19.05 KG/M2

## 2022-06-13 DIAGNOSIS — I71.40 ABDOMINAL AORTIC ANEURYSM (AAA) WITHOUT RUPTURE: ICD-10-CM

## 2022-06-13 DIAGNOSIS — K55.1 MESENTERIC ARTERY STENOSIS (HCC): Primary | ICD-10-CM

## 2022-06-13 PROCEDURE — G8427 DOCREV CUR MEDS BY ELIG CLIN: HCPCS | Performed by: STUDENT IN AN ORGANIZED HEALTH CARE EDUCATION/TRAINING PROGRAM

## 2022-06-13 PROCEDURE — 3017F COLORECTAL CA SCREEN DOC REV: CPT | Performed by: STUDENT IN AN ORGANIZED HEALTH CARE EDUCATION/TRAINING PROGRAM

## 2022-06-13 PROCEDURE — 99213 OFFICE O/P EST LOW 20 MIN: CPT | Performed by: STUDENT IN AN ORGANIZED HEALTH CARE EDUCATION/TRAINING PROGRAM

## 2022-06-13 PROCEDURE — 4004F PT TOBACCO SCREEN RCVD TLK: CPT | Performed by: STUDENT IN AN ORGANIZED HEALTH CARE EDUCATION/TRAINING PROGRAM

## 2022-06-13 PROCEDURE — G8420 CALC BMI NORM PARAMETERS: HCPCS | Performed by: STUDENT IN AN ORGANIZED HEALTH CARE EDUCATION/TRAINING PROGRAM

## 2022-06-13 ASSESSMENT — ENCOUNTER SYMPTOMS
NAUSEA: 1
ABDOMINAL PAIN: 1
SHORTNESS OF BREATH: 0

## 2022-06-13 NOTE — PROGRESS NOTES
Cherylene Loveless (:  1960) is a 57 y.o. male,Established patient, here for evaluation of the following chief complaint(s):  Consultation         ASSESSMENT/PLAN:  1. Mesenteric artery stenosis (HCC)  -     CTA ABDOMEN PELVIS W CONTRAST; Future  2. Abdominal aortic aneurysm (AAA) without rupture [facility])       This is a 57year old male patient who presents for evaluation of abdominal aortic aneurysm and mesenteric stenosis. His symptoms are somewhat inconsistent however pain after eating and weight loss can have association with mesenteric disease. Would therefore recommend CTA to further characterize the disease but also the aneurysm given its increase in size. Patient would like to do the CTA and determine if intervention warranted at this time. Encouraged smoking cessation. All questions answered. Subjective   SUBJECTIVE/OBJECTIVE:  This is a 57year old male patient who is known to the office for AAA. He endorsed nausea and some weight loss and special attention to his visceral vessels on his last duplex demonstrated elevated velocities above criteria for significant stenosis of 280 in the celiac and 289 in the SMA. He endorses occasional pain after eating but inconsistent. He does lose weight but also gains weight. When prompted he states that he loses more than he gains. He has no teeth and his dentures do not fit therefore his diet is restricted. He continues to smoke. He has chronic sinus drainage. He denies resting abdominal pain. He denies pain with ambulation. He lives with his wife and two children. No active chest pain or shortness of breath. He states he is planning to undergo cervical spine surgery for \"severe\" neck pain. Review of Systems   Constitutional: Negative for chills and fever. HENT:        Sinus drainage-chronic   Respiratory: Negative for shortness of breath. Cardiovascular: Negative for chest pain. Gastrointestinal: Positive for abdominal pain and nausea. Musculoskeletal: Positive for neck pain. Neurological: Negative for weakness and numbness. Psychiatric/Behavioral: Negative for agitation. Objective   Physical Exam  Constitutional:       Appearance: Normal appearance. Cardiovascular:      Rate and Rhythm: Normal rate and regular rhythm. Pulmonary:      Effort: Pulmonary effort is normal. No respiratory distress. Abdominal:      Palpations: Abdomen is soft. Musculoskeletal:         General: Normal range of motion. Skin:     General: Skin is warm and dry. Neurological:      General: No focal deficit present. Mental Status: He is alert. Psychiatric:         Mood and Affect: Mood normal.         Behavior: Behavior normal.         Thought Content: Thought content normal.         Judgment: Judgment normal.            On this date 6/13/2022 I have spent 25 minutes reviewing previous notes, test results and face to face with the patient discussing the diagnosis and importance of compliance with the treatment plan as well as documenting on the day of the visit.     Omar Mann DO, FSVS, [address] [facility]

## 2022-06-14 ENCOUNTER — APPOINTMENT (OUTPATIENT)
Dept: GENERAL RADIOLOGY | Age: 62
DRG: 174 | End: 2022-06-14
Payer: COMMERCIAL

## 2022-06-14 ENCOUNTER — HOSPITAL ENCOUNTER (INPATIENT)
Age: 62
LOS: 1 days | Discharge: HOME HEALTH | DRG: 174 | End: 2022-06-15
Attending: EMERGENCY MEDICINE | Admitting: INTERNAL MEDICINE
Payer: COMMERCIAL

## 2022-06-14 DIAGNOSIS — I21.3 ST ELEVATION MYOCARDIAL INFARCTION (STEMI), UNSPECIFIED ARTERY (HCC): Primary | ICD-10-CM

## 2022-06-14 PROBLEM — I25.2 HISTORY OF ACUTE INFERIOR WALL MI: Status: ACTIVE | Noted: 2022-06-13

## 2022-06-14 PROBLEM — I21.19 ACUTE INFERIOR MYOCARDIAL INFARCTION (HCC): Status: ACTIVE | Noted: 2022-06-14

## 2022-06-14 LAB
ALBUMIN SERPL-MCNC: 4.6 G/DL (ref 3.4–5)
ALP BLD-CCNC: 93 U/L (ref 40–129)
ALT SERPL-CCNC: 13 U/L (ref 10–40)
ANION GAP SERPL CALCULATED.3IONS-SCNC: 13 MMOL/L (ref 3–16)
ANION GAP SERPL CALCULATED.3IONS-SCNC: 17 MMOL/L (ref 3–16)
APTT: 28.7 SEC (ref 23–34.3)
AST SERPL-CCNC: 25 U/L (ref 15–37)
BASOPHILS ABSOLUTE: 0 K/UL (ref 0–0.2)
BASOPHILS RELATIVE PERCENT: 0.2 %
BILIRUB SERPL-MCNC: 0.4 MG/DL (ref 0–1)
BILIRUBIN DIRECT: <0.2 MG/DL (ref 0–0.3)
BILIRUBIN, INDIRECT: NORMAL MG/DL (ref 0–1)
BUN BLDV-MCNC: 14 MG/DL (ref 7–20)
BUN BLDV-MCNC: 17 MG/DL (ref 7–20)
CALCIUM SERPL-MCNC: 8.2 MG/DL (ref 8.3–10.6)
CALCIUM SERPL-MCNC: 9.4 MG/DL (ref 8.3–10.6)
CHLORIDE BLD-SCNC: 104 MMOL/L (ref 99–110)
CHLORIDE BLD-SCNC: 99 MMOL/L (ref 99–110)
CO2: 18 MMOL/L (ref 21–32)
CO2: 21 MMOL/L (ref 21–32)
CREAT SERPL-MCNC: 0.8 MG/DL (ref 0.8–1.3)
CREAT SERPL-MCNC: 1.1 MG/DL (ref 0.8–1.3)
EKG ATRIAL RATE: 80 BPM
EKG ATRIAL RATE: 85 BPM
EKG DIAGNOSIS: NORMAL
EKG DIAGNOSIS: NORMAL
EKG P AXIS: 76 DEGREES
EKG P AXIS: 82 DEGREES
EKG P-R INTERVAL: 160 MS
EKG P-R INTERVAL: 160 MS
EKG Q-T INTERVAL: 388 MS
EKG Q-T INTERVAL: 394 MS
EKG QRS DURATION: 74 MS
EKG QRS DURATION: 76 MS
EKG QTC CALCULATION (BAZETT): 447 MS
EKG QTC CALCULATION (BAZETT): 476 MS
EKG R AXIS: 73 DEGREES
EKG R AXIS: 89 DEGREES
EKG T AXIS: 20 DEGREES
EKG T AXIS: 97 DEGREES
EKG VENTRICULAR RATE: 80 BPM
EKG VENTRICULAR RATE: 88 BPM
EOSINOPHILS ABSOLUTE: 0.3 K/UL (ref 0–0.6)
EOSINOPHILS RELATIVE PERCENT: 2.7 %
ESTIMATED AVERAGE GLUCOSE: 111.2 MG/DL
GFR AFRICAN AMERICAN: >60
GFR AFRICAN AMERICAN: >60
GFR NON-AFRICAN AMERICAN: >60
GFR NON-AFRICAN AMERICAN: >60
GLUCOSE BLD-MCNC: 128 MG/DL (ref 70–99)
GLUCOSE BLD-MCNC: 93 MG/DL (ref 70–99)
HBA1C MFR BLD: 5.5 %
HCT VFR BLD CALC: 47.7 % (ref 40.5–52.5)
HEMOGLOBIN: 16.6 G/DL (ref 13.5–17.5)
LIPASE: 26 U/L (ref 13–60)
LYMPHOCYTES ABSOLUTE: 4.5 K/UL (ref 1–5.1)
LYMPHOCYTES RELATIVE PERCENT: 48.1 %
MCH RBC QN AUTO: 32.1 PG (ref 26–34)
MCHC RBC AUTO-ENTMCNC: 34.7 G/DL (ref 31–36)
MCV RBC AUTO: 92.6 FL (ref 80–100)
MONOCYTES ABSOLUTE: 0.8 K/UL (ref 0–1.3)
MONOCYTES RELATIVE PERCENT: 8.9 %
NEUTROPHILS ABSOLUTE: 3.7 K/UL (ref 1.7–7.7)
NEUTROPHILS RELATIVE PERCENT: 40.1 %
PDW BLD-RTO: 13.8 % (ref 12.4–15.4)
PLATELET # BLD: 195 K/UL (ref 135–450)
PMV BLD AUTO: 8.5 FL (ref 5–10.5)
POC ACT LR: 306 SEC
POC ACT LR: >400 SEC
POTASSIUM REFLEX MAGNESIUM: 4 MMOL/L (ref 3.5–5.1)
POTASSIUM SERPL-SCNC: 4.3 MMOL/L (ref 3.5–5.1)
PRO-BNP: 176 PG/ML (ref 0–124)
RBC # BLD: 5.16 M/UL (ref 4.2–5.9)
SODIUM BLD-SCNC: 135 MMOL/L (ref 136–145)
SODIUM BLD-SCNC: 137 MMOL/L (ref 136–145)
TOTAL PROTEIN: 7.4 G/DL (ref 6.4–8.2)
TROPONIN: 0.01 NG/ML
TROPONIN: 4.21 NG/ML
WBC # BLD: 9.3 K/UL (ref 4–11)

## 2022-06-14 PROCEDURE — 83036 HEMOGLOBIN GLYCOSYLATED A1C: CPT

## 2022-06-14 PROCEDURE — 80048 BASIC METABOLIC PNL TOTAL CA: CPT

## 2022-06-14 PROCEDURE — 6360000004 HC RX CONTRAST MEDICATION: Performed by: EMERGENCY MEDICINE

## 2022-06-14 PROCEDURE — 2100000000 HC CCU R&B

## 2022-06-14 PROCEDURE — 85025 COMPLETE CBC W/AUTO DIFF WBC: CPT

## 2022-06-14 PROCEDURE — C1760 CLOSURE DEV, VASC: HCPCS

## 2022-06-14 PROCEDURE — 99152 MOD SED SAME PHYS/QHP 5/>YRS: CPT | Performed by: INTERNAL MEDICINE

## 2022-06-14 PROCEDURE — 6360000002 HC RX W HCPCS: Performed by: INTERNAL MEDICINE

## 2022-06-14 PROCEDURE — C1769 GUIDE WIRE: HCPCS

## 2022-06-14 PROCEDURE — B2151ZZ FLUOROSCOPY OF LEFT HEART USING LOW OSMOLAR CONTRAST: ICD-10-PCS | Performed by: INTERNAL MEDICINE

## 2022-06-14 PROCEDURE — 93458 L HRT ARTERY/VENTRICLE ANGIO: CPT

## 2022-06-14 PROCEDURE — C1894 INTRO/SHEATH, NON-LASER: HCPCS

## 2022-06-14 PROCEDURE — 6370000000 HC RX 637 (ALT 250 FOR IP): Performed by: EMERGENCY MEDICINE

## 2022-06-14 PROCEDURE — 6360000002 HC RX W HCPCS

## 2022-06-14 PROCEDURE — 99285 EMERGENCY DEPT VISIT HI MDM: CPT

## 2022-06-14 PROCEDURE — C1725 CATH, TRANSLUMIN NON-LASER: HCPCS

## 2022-06-14 PROCEDURE — 93010 ELECTROCARDIOGRAM REPORT: CPT | Performed by: INTERNAL MEDICINE

## 2022-06-14 PROCEDURE — C1874 STENT, COATED/COV W/DEL SYS: HCPCS

## 2022-06-14 PROCEDURE — 96367 TX/PROPH/DG ADDL SEQ IV INF: CPT

## 2022-06-14 PROCEDURE — 92941 PRQ TRLML REVSC TOT OCCL AMI: CPT

## 2022-06-14 PROCEDURE — C1887 CATHETER, GUIDING: HCPCS

## 2022-06-14 PROCEDURE — 36415 COLL VENOUS BLD VENIPUNCTURE: CPT

## 2022-06-14 PROCEDURE — 80076 HEPATIC FUNCTION PANEL: CPT

## 2022-06-14 PROCEDURE — 83880 ASSAY OF NATRIURETIC PEPTIDE: CPT

## 2022-06-14 PROCEDURE — 93458 L HRT ARTERY/VENTRICLE ANGIO: CPT | Performed by: INTERNAL MEDICINE

## 2022-06-14 PROCEDURE — 2580000003 HC RX 258: Performed by: INTERNAL MEDICINE

## 2022-06-14 PROCEDURE — 99153 MOD SED SAME PHYS/QHP EA: CPT

## 2022-06-14 PROCEDURE — 2500000003 HC RX 250 WO HCPCS: Performed by: EMERGENCY MEDICINE

## 2022-06-14 PROCEDURE — 2500000003 HC RX 250 WO HCPCS

## 2022-06-14 PROCEDURE — 99291 CRITICAL CARE FIRST HOUR: CPT | Performed by: INTERNAL MEDICINE

## 2022-06-14 PROCEDURE — 85730 THROMBOPLASTIN TIME PARTIAL: CPT

## 2022-06-14 PROCEDURE — 83690 ASSAY OF LIPASE: CPT

## 2022-06-14 PROCEDURE — 027034Z DILATION OF CORONARY ARTERY, ONE ARTERY WITH DRUG-ELUTING INTRALUMINAL DEVICE, PERCUTANEOUS APPROACH: ICD-10-PCS | Performed by: INTERNAL MEDICINE

## 2022-06-14 PROCEDURE — 94761 N-INVAS EAR/PLS OXIMETRY MLT: CPT

## 2022-06-14 PROCEDURE — 92941 PRQ TRLML REVSC TOT OCCL AMI: CPT | Performed by: INTERNAL MEDICINE

## 2022-06-14 PROCEDURE — B2111ZZ FLUOROSCOPY OF MULTIPLE CORONARY ARTERIES USING LOW OSMOLAR CONTRAST: ICD-10-PCS | Performed by: INTERNAL MEDICINE

## 2022-06-14 PROCEDURE — 6360000002 HC RX W HCPCS: Performed by: EMERGENCY MEDICINE

## 2022-06-14 PROCEDURE — 99152 MOD SED SAME PHYS/QHP 5/>YRS: CPT

## 2022-06-14 PROCEDURE — 6370000000 HC RX 637 (ALT 250 FOR IP): Performed by: INTERNAL MEDICINE

## 2022-06-14 PROCEDURE — 85347 COAGULATION TIME ACTIVATED: CPT

## 2022-06-14 PROCEDURE — 84484 ASSAY OF TROPONIN QUANT: CPT

## 2022-06-14 PROCEDURE — 96365 THER/PROPH/DIAG IV INF INIT: CPT

## 2022-06-14 PROCEDURE — 4A023N7 MEASUREMENT OF CARDIAC SAMPLING AND PRESSURE, LEFT HEART, PERCUTANEOUS APPROACH: ICD-10-PCS | Performed by: INTERNAL MEDICINE

## 2022-06-14 PROCEDURE — 71045 X-RAY EXAM CHEST 1 VIEW: CPT

## 2022-06-14 PROCEDURE — 93005 ELECTROCARDIOGRAM TRACING: CPT | Performed by: EMERGENCY MEDICINE

## 2022-06-14 PROCEDURE — 94760 N-INVAS EAR/PLS OXIMETRY 1: CPT

## 2022-06-14 PROCEDURE — 6370000000 HC RX 637 (ALT 250 FOR IP)

## 2022-06-14 PROCEDURE — 96375 TX/PRO/DX INJ NEW DRUG ADDON: CPT

## 2022-06-14 PROCEDURE — 2709999900 HC NON-CHARGEABLE SUPPLY

## 2022-06-14 RX ORDER — HEPARIN SODIUM 1000 [USP'U]/ML
3200 INJECTION, SOLUTION INTRAVENOUS; SUBCUTANEOUS ONCE
Status: COMPLETED | OUTPATIENT
Start: 2022-06-14 | End: 2022-06-14

## 2022-06-14 RX ORDER — HEPARIN SODIUM 10000 [USP'U]/100ML
0-3000 INJECTION, SOLUTION INTRAVENOUS CONTINUOUS
Status: DISCONTINUED | OUTPATIENT
Start: 2022-06-14 | End: 2022-06-14

## 2022-06-14 RX ORDER — MORPHINE SULFATE 2 MG/ML
2 INJECTION, SOLUTION INTRAMUSCULAR; INTRAVENOUS
Status: ACTIVE | OUTPATIENT
Start: 2022-06-14 | End: 2022-06-14

## 2022-06-14 RX ORDER — HEPARIN SODIUM 5000 [USP'U]/ML
INJECTION, SOLUTION INTRAVENOUS; SUBCUTANEOUS
Status: DISCONTINUED
Start: 2022-06-14 | End: 2022-06-14

## 2022-06-14 RX ORDER — EPTIFIBATIDE 0.75 MG/ML
2 INJECTION, SOLUTION INTRAVENOUS CONTINUOUS
Status: DISPENSED | OUTPATIENT
Start: 2022-06-14 | End: 2022-06-14

## 2022-06-14 RX ORDER — ENOXAPARIN SODIUM 100 MG/ML
40 INJECTION SUBCUTANEOUS EVERY 12 HOURS
Status: DISCONTINUED | OUTPATIENT
Start: 2022-06-14 | End: 2022-06-15 | Stop reason: HOSPADM

## 2022-06-14 RX ORDER — ASPIRIN 81 MG/1
324 TABLET, CHEWABLE ORAL ONCE
Status: COMPLETED | OUTPATIENT
Start: 2022-06-14 | End: 2022-06-14

## 2022-06-14 RX ORDER — ONDANSETRON 2 MG/ML
4 INJECTION INTRAMUSCULAR; INTRAVENOUS EVERY 6 HOURS PRN
Status: DISCONTINUED | OUTPATIENT
Start: 2022-06-14 | End: 2022-06-15 | Stop reason: HOSPADM

## 2022-06-14 RX ORDER — ROSUVASTATIN CALCIUM 40 MG/1
40 TABLET, COATED ORAL NIGHTLY
Status: DISCONTINUED | OUTPATIENT
Start: 2022-06-14 | End: 2022-06-15 | Stop reason: HOSPADM

## 2022-06-14 RX ORDER — ATROPINE SULFATE 0.4 MG/ML
0.5 AMPUL (ML) INJECTION
Status: ACTIVE | OUTPATIENT
Start: 2022-06-14 | End: 2022-06-14

## 2022-06-14 RX ORDER — ACETAMINOPHEN 325 MG/1
650 TABLET ORAL EVERY 4 HOURS PRN
Status: DISCONTINUED | OUTPATIENT
Start: 2022-06-14 | End: 2022-06-15 | Stop reason: HOSPADM

## 2022-06-14 RX ORDER — HEPARIN SODIUM 1000 [USP'U]/ML
60 INJECTION, SOLUTION INTRAVENOUS; SUBCUTANEOUS PRN
Status: DISCONTINUED | OUTPATIENT
Start: 2022-06-14 | End: 2022-06-14

## 2022-06-14 RX ORDER — SODIUM CHLORIDE 0.9 % (FLUSH) 0.9 %
5-40 SYRINGE (ML) INJECTION EVERY 12 HOURS SCHEDULED
Status: DISCONTINUED | OUTPATIENT
Start: 2022-06-14 | End: 2022-06-15 | Stop reason: HOSPADM

## 2022-06-14 RX ORDER — MORPHINE SULFATE 4 MG/ML
4 INJECTION, SOLUTION INTRAMUSCULAR; INTRAVENOUS ONCE
Status: COMPLETED | OUTPATIENT
Start: 2022-06-14 | End: 2022-06-14

## 2022-06-14 RX ORDER — ASPIRIN 81 MG/1
81 TABLET, CHEWABLE ORAL DAILY
Status: DISCONTINUED | OUTPATIENT
Start: 2022-06-15 | End: 2022-06-15 | Stop reason: HOSPADM

## 2022-06-14 RX ORDER — HYDRALAZINE HYDROCHLORIDE 20 MG/ML
10 INJECTION INTRAMUSCULAR; INTRAVENOUS
Status: DISCONTINUED | OUTPATIENT
Start: 2022-06-14 | End: 2022-06-15 | Stop reason: HOSPADM

## 2022-06-14 RX ORDER — 0.9 % SODIUM CHLORIDE 0.9 %
250 INTRAVENOUS SOLUTION INTRAVENOUS PRN
Status: DISCONTINUED | OUTPATIENT
Start: 2022-06-14 | End: 2022-06-15 | Stop reason: HOSPADM

## 2022-06-14 RX ORDER — SODIUM CHLORIDE 0.9 % (FLUSH) 0.9 %
5-40 SYRINGE (ML) INJECTION PRN
Status: DISCONTINUED | OUTPATIENT
Start: 2022-06-14 | End: 2022-06-15 | Stop reason: HOSPADM

## 2022-06-14 RX ORDER — TRAMADOL HYDROCHLORIDE 50 MG/1
50 TABLET ORAL EVERY 4 HOURS PRN
Status: DISCONTINUED | OUTPATIENT
Start: 2022-06-14 | End: 2022-06-15 | Stop reason: HOSPADM

## 2022-06-14 RX ORDER — METOPROLOL SUCCINATE 25 MG/1
25 TABLET, EXTENDED RELEASE ORAL DAILY
Status: DISCONTINUED | OUTPATIENT
Start: 2022-06-14 | End: 2022-06-15 | Stop reason: HOSPADM

## 2022-06-14 RX ORDER — HEPARIN SODIUM 1000 [USP'U]/ML
30 INJECTION, SOLUTION INTRAVENOUS; SUBCUTANEOUS PRN
Status: DISCONTINUED | OUTPATIENT
Start: 2022-06-14 | End: 2022-06-14

## 2022-06-14 RX ORDER — SODIUM CHLORIDE 9 MG/ML
INJECTION, SOLUTION INTRAVENOUS PRN
Status: DISCONTINUED | OUTPATIENT
Start: 2022-06-14 | End: 2022-06-15 | Stop reason: HOSPADM

## 2022-06-14 RX ADMIN — TRAMADOL HYDROCHLORIDE 50 MG: 50 TABLET, COATED ORAL at 21:18

## 2022-06-14 RX ADMIN — IOPAMIDOL 145 ML: 755 INJECTION, SOLUTION INTRAVENOUS at 05:29

## 2022-06-14 RX ADMIN — TICAGRELOR 90 MG: 90 TABLET ORAL at 08:19

## 2022-06-14 RX ADMIN — ENOXAPARIN SODIUM 40 MG: 100 INJECTION SUBCUTANEOUS at 17:16

## 2022-06-14 RX ADMIN — HEPARIN SODIUM 640 UNITS/HR: 10000 INJECTION, SOLUTION INTRAVENOUS at 04:34

## 2022-06-14 RX ADMIN — TRAMADOL HYDROCHLORIDE 50 MG: 50 TABLET, COATED ORAL at 17:41

## 2022-06-14 RX ADMIN — EPTIFIBATIDE 2 MCG/KG/MIN: 75 INJECTION INTRAVENOUS at 12:10

## 2022-06-14 RX ADMIN — ASPIRIN 324 MG: 81 TABLET, CHEWABLE ORAL at 04:21

## 2022-06-14 RX ADMIN — EPTIFIBATIDE 2 MCG/KG/MIN: 75 INJECTION INTRAVENOUS at 05:58

## 2022-06-14 RX ADMIN — MORPHINE SULFATE 4 MG: 4 INJECTION, SOLUTION INTRAMUSCULAR; INTRAVENOUS at 04:20

## 2022-06-14 RX ADMIN — METOPROLOL SUCCINATE 25 MG: 25 TABLET, EXTENDED RELEASE ORAL at 08:19

## 2022-06-14 RX ADMIN — ROSUVASTATIN CALCIUM 40 MG: 40 TABLET, FILM COATED ORAL at 21:17

## 2022-06-14 RX ADMIN — SODIUM CHLORIDE, PRESERVATIVE FREE 20 ML: 5 INJECTION INTRAVENOUS at 21:13

## 2022-06-14 RX ADMIN — HEPARIN SODIUM 3200 UNITS: 1000 INJECTION INTRAVENOUS; SUBCUTANEOUS at 04:30

## 2022-06-14 RX ADMIN — SODIUM CHLORIDE, PRESERVATIVE FREE 10 ML: 5 INJECTION INTRAVENOUS at 08:19

## 2022-06-14 RX ADMIN — TICAGRELOR 90 MG: 90 TABLET ORAL at 21:17

## 2022-06-14 RX ADMIN — ACETAMINOPHEN 650 MG: 325 TABLET ORAL at 10:24

## 2022-06-14 ASSESSMENT — PAIN DESCRIPTION - DESCRIPTORS
DESCRIPTORS: DISCOMFORT
DESCRIPTORS: DISCOMFORT
DESCRIPTORS: ACHING
DESCRIPTORS: DISCOMFORT
DESCRIPTORS: ACHING

## 2022-06-14 ASSESSMENT — ENCOUNTER SYMPTOMS
RECTAL PAIN: 0
VOMITING: 0
ABDOMINAL DISTENTION: 0
COLOR CHANGE: 0
EYE REDNESS: 0
BACK PAIN: 0
EYE ITCHING: 0
CHOKING: 0
PHOTOPHOBIA: 0
DIARRHEA: 0
APNEA: 0
EYE DISCHARGE: 0
WHEEZING: 0
COUGH: 0
ANAL BLEEDING: 0
NAUSEA: 0
CONSTIPATION: 0
SHORTNESS OF BREATH: 0
ABDOMINAL PAIN: 0
STRIDOR: 0
CHEST TIGHTNESS: 0
BLOOD IN STOOL: 0
EYE PAIN: 0

## 2022-06-14 ASSESSMENT — PAIN DESCRIPTION - FREQUENCY
FREQUENCY: CONTINUOUS

## 2022-06-14 ASSESSMENT — PAIN DESCRIPTION - ORIENTATION
ORIENTATION: MID
ORIENTATION: MID
ORIENTATION: ANTERIOR
ORIENTATION: MID

## 2022-06-14 ASSESSMENT — PAIN DESCRIPTION - PAIN TYPE
TYPE: ACUTE PAIN
TYPE: ACUTE PAIN
TYPE: CHRONIC PAIN

## 2022-06-14 ASSESSMENT — PAIN DESCRIPTION - LOCATION
LOCATION: SHOULDER
LOCATION: BACK
LOCATION: CHEST
LOCATION: BACK
LOCATION: BACK

## 2022-06-14 ASSESSMENT — PAIN DESCRIPTION - ONSET
ONSET: ON-GOING

## 2022-06-14 ASSESSMENT — PAIN SCALES - GENERAL
PAINLEVEL_OUTOF10: 3
PAINLEVEL_OUTOF10: 7
PAINLEVEL_OUTOF10: 4
PAINLEVEL_OUTOF10: 5
PAINLEVEL_OUTOF10: 10
PAINLEVEL_OUTOF10: 5
PAINLEVEL_OUTOF10: 3
PAINLEVEL_OUTOF10: 0
PAINLEVEL_OUTOF10: 10

## 2022-06-14 ASSESSMENT — PAIN - FUNCTIONAL ASSESSMENT
PAIN_FUNCTIONAL_ASSESSMENT: ACTIVITIES ARE NOT PREVENTED
PAIN_FUNCTIONAL_ASSESSMENT: 0-10
PAIN_FUNCTIONAL_ASSESSMENT: ACTIVITIES ARE NOT PREVENTED
PAIN_FUNCTIONAL_ASSESSMENT: PREVENTS OR INTERFERES SOME ACTIVE ACTIVITIES AND ADLS

## 2022-06-14 NOTE — H&P
[email or web address]  1960 June 14, 2022    CC: Chest Pain    HPI:  The patient is 57 y.o. male with a past medical history significant for AAA, tobacco abuse and essential hypertension who presented to the [facility] ED with chest pain. The pain had been occurring for about 24 hours. intermittently. He states it was difficult for him to differentiate this pain from his chronic neck pain. This was severe and had been constant since about 8pm last night. There was radiation of the pain into his neck and down his left arm. He was short of breath. There was no nausea. On presentation to the catheterization lab, he is still having chest pain that he rates a 10 out of 10 in severity. He says he was seen in the office by Dr. Annmarie Wharton for a AAA 2 days ago. He lives with his 11yo son and wife who has cognitive disabilities. Review of Systems:  Constitutional: No fatigue, weakness, night sweats or fever. HEENT: No new vision difficulties or ringing in the ears. Respiratory: No new SOB, PND, orthopnea or cough. Cardiovascular: See HPI   GI: No n/v, diarrhea, constipation, abdominal pain or changes in bowel habits. No melena, no hematochezia  : No urinary frequency, urgency, incontinence, hematuria or dysuria. Skin: No cyanosis or skin lesions. Musculoskeletal: No new muscle or joint pain. Neurological: No syncope or TIA-like symptoms.   Psychiatric: No anxiety, insomnia or depression     Past Medical History:   Diagnosis Date    AAA (abdominal aortic aneurysm) ([facility] [address] [address] [address] 03/22/2021    Dr. Valentina Franklin Bilateral hand pain     Chronic left-sided low back pain with left-sided sciatica     Chronic nausea     Chronic seasonal allergic rhinitis due to pollen     DDD (degenerative disc disease), cervical     Essential hypertension     Major depression single episode, in partial remission ([address]     Tobacco abuse      Past Surgical History:   Procedure Laterality Date    BONE MARROW HARVEST  1994    Donor    WISDOM TOOTH EXTRACTION       Family History   Problem Relation Age of Onset    Heart Disease Mother         triple bypass    Heart Surgery Mother         triple by pass 12     Other Father         disappeared in [place]         Hodgkin's Lymphoma     High Blood Pressure Maternal Uncle      Social History     Tobacco Use    Smoking status: Current Every Day Smoker     Packs/day: 1.00     Years: 20.00     Pack years: 20.00     Types: Cigarettes    Smokeless tobacco: Never Used   Vaping Use    Vaping Use: Former   Substance Use Topics    Alcohol use: Yes     Comment: Social     Drug use: No       Allergies   Allergen Reactions    Codeine Nausea Only    Wellbutrin [Bupropion]      Insomnia      Current Facility-Administered Medications   Medication Dose Route Frequency Provider Last Rate Last Admin    heparin 25,000 unit in sodium chloride 0.45% 250 mL (premix) infusion  0-3,000 Units/hr IntraVENous Continuous Lety Prasad MD 6.4 mL/hr at 06/14/22 0434 640 Units/hr at 06/14/22 0434    heparin (porcine) 5000 UNIT/ML injection             sodium chloride flush 0.9 % injection 5-40 mL  5-40 mL IntraVENous 2 times per day Jarocho Miranda MD        sodium chloride flush 0.9 % injection 5-40 mL  5-40 mL IntraVENous PRN Jarocho Miranda MD        0.9 % sodium chloride infusion   IntraVENous PRN Jarocho Miranda MD        acetaminophen (TYLENOL) tablet 650 mg  650 mg Oral Q4H PRN Jarocho Miranda MD        atropine injection 0.5 mg  0.5 mg IntraVENous Once PRN Jarocho Miranda MD        morphine (PF) injection 2 mg  2 mg IntraVENous Once PRN Jarocho Miranda MD        0.9 % sodium chloride bolus  250 mL IntraVENous PRN Jarocho Miranda MD        [facility] injection 4 mg  4 mg IntraVENous Q6H PRN Jarocho Miranda MD        hydrALAZINE (APRESOLINE) injection 10 mg  10 mg IntraVENous Q1H PRN James Meigs, MD        metoprolol succinate (TOPROL XL) extended release tablet 25 mg  25 mg Oral Daily James Meigs, MD        rosuvastatin (CRESTOR) tablet 40 mg  40 mg Oral Nightly James Meigs, MD        enoxaparin (LOVENOX) injection 40 mg  40 mg SubCUTAneous Q12H James Meigs, MD        [START ON 6/15/2022] aspirin chewable tablet 81 mg  81 mg Oral Daily James Meigs, MD        ticagrelor [facility]) tablet 90 mg  90 mg Oral BID James Meigs, MD        eptifibatide (INTEGRILIN) 0.75 mg/mL infusion  2 mcg/kg/min IntraVENous Continuous James Meigs, MD         No current outpatient medications on file. Physical Exam:   BP (!) 167/112   Pulse 79   Temp 97.8 °F (36.6 °C) (Oral)   Resp 14   Ht 5' 6\" (1.676 m)   Wt 118 lb 2.7 oz (53.6 kg)   SpO2 98%   BMI 19.07 kg/m²   No intake or output data in the 24 hours ending 06/14/22 0541  Wt Readings from Last 2 Encounters:   06/14/22 118 lb 2.7 oz (53.6 kg)   06/06/22 118 lb (53.5 kg)     Constitutional: He is oriented to person, place, and time. He appears well-developed and well-nourished. In no acute distress. Head: Normocephalic and atraumatic. Neck: Neck supple. No JVD present. Carotid bruit is not present. No mass and no thyromegaly present. No lymphadenopathy present. Cardiovascular: Normal rate, regular rhythm, normal heart sounds and intact distal pulses. Exam reveals no gallop and no friction rub. No murmur heard. Pulmonary/Chest: Effort normal and breath sounds normal. No respiratory distress. He has no wheezes, rhonchi or rales. Abdominal: Soft, non-tender. Bowel sounds and aorta are normal. He exhibits no organomegaly, mass or bruit. Extremities: No edema, cyanosis, or clubbing. Pulses are 2+ radial/carotid/dorsalis pedis and posterior tibial bilaterally. Neurological: He is alert and oriented to person, place, and time. He has normal reflexes.  No cranial nerve deficit. Coordination normal.   Skin: Skin is warm and dry. There is no rash or diaphoresis. Psychiatric: He has a normal mood and affect. His speech is normal and behavior is normal.     Personally reviewed and interpreted   EKG Interpretation: Sinus rhythm with acute inferior ischemia    Lab Review:   Lab Results   Component Value Date    TRIG 86 06/06/2022    HDL 48 06/06/2022    LDLCALC 141 06/06/2022    LABVLDL 17 06/06/2022     Lab Results   Component Value Date     06/14/2022    K 4.0 06/14/2022    BUN 17 06/14/2022    CREATININE 1.1 06/14/2022     Recent Labs     06/14/22  0413   WBC 9.3   HGB 16.6   HCT 47.7          Assessment / Plan:    1. Acute Inferior Myocardial Infarction  Phoenix Bermudez is now stable post PCI to his 100% occluded dominant RCA. He received a 3.5mm X 34mm Willie stent restoring WANDA 3 flow with 0% residual stenosis. He will be on aspirin and Brilinta for DAPT. His LVEF is 55%. 2. Hyperlipidemia with goal LDL<70mg/dL  Starting rosuvastatin 40mg daily. 3. Tobacco Abuse  I encouraged him in complete smoking cessation. 4. Essential Hypertension  Starting Toprol XL 25mg daily. The patient's condition on arrival was unstable and life threatening. Without intervention independent of the procedure he would have continued to worsen. Total critical care time 35 minutes.

## 2022-06-14 NOTE — CARE COORDINATION
Chart Reviewed. Attempted to assess patient's DC needs but he is very upset about lack of pain medications. He would not answer any of my questions. He confirmed he does have a pcp. He wants any scripts to go to [facility]. He does not take any prescriptions at home he reports. He would not answer any of my questions about his abilities at home of caring for himself or his home situation. Unable to engage for ACP.   Mary Lou East, [facility]     Case Management   [phone]    6/14/2022  4:45 PM

## 2022-06-14 NOTE — CONSULTS
[address] [place] [address]  CARDIOPULMONARY PHASE I CONSULT        NAME:  [facility] RECORD NUMBER:  [id number]  AGE: 57 y.o.    GENDER: male  : 1960  TODAY'S DATE:  2022    Subjective:     VISIT TYPE: evaluation     ADMITTING PHYSICIAN:  Acosta Jo MD     PAST MEDICAL HISTORY        Diagnosis Date    AAA (abdominal aortic aneurysm) ([facility] 2021    Dr. Dimitris Paulson Bilateral hand pain     Chronic left-sided low back pain with left-sided sciatica     Chronic nausea     Chronic seasonal allergic rhinitis due to pollen     DDD (degenerative disc disease), cervical     Essential hypertension     History of acute inferior wall MI 2022    Dr. Vikram Tate Major depression single episode, in partial remission ([facility]     Tobacco abuse        SOCIAL HISTORY    Social History     Tobacco Use    Smoking status: Current Every Day Smoker     Packs/day: 1.00     Years: 20.00     Pack years: 20.00     Types: Cigarettes    Smokeless tobacco: Never Used   Vaping Use    Vaping Use: Former   Substance Use Topics    Alcohol use: Yes     Comment: Social     Drug use: No       ALLERGIES    Allergies   Allergen Reactions    Codeine Nausea Only    Wellbutrin [Bupropion]      Insomnia        MEDICATIONS  Scheduled Meds:   sodium chloride flush  5-40 mL IntraVENous 2 times per day    metoprolol succinate  25 mg Oral Daily    rosuvastatin  40 mg Oral Nightly    enoxaparin  40 mg SubCUTAneous Q12H    [START ON 6/15/2022] aspirin  81 mg Oral Daily    ticagrelor  90 mg Oral BID       ADMIT DATE: 2022      Objective:     ADMISSION DIAGNOSIS:   Acute inferior myocardial infarction ([facility] [I21.19]  ST elevation myocardial infarction (STEMI), unspecified artery (HCC) [I21.3]     /81   Pulse 72   Temp 97.4 °F (36.3 °C) (Oral)   Resp 21   Ht 5' 6\" (1.676 m)   Wt 122 lb 5.7 oz (55.5 kg)   SpO2 98%   BMI 19.75 kg/m²     ADMIT:  Weight: 116 lb 13.5 oz (53 kg)    TODAY: Weight: 122 lb 5.7 oz (55.5 kg)    Wt Readings from Last 3 Encounters:   06/14/22 122 lb 5.7 oz (55.5 kg)   06/06/22 118 lb (53.5 kg)   05/26/22 114 lb (51.7 kg)        ECHOCARDIOGRAM: 55%    HgBA1c:  No components found for: HGBA1C  LIPID PANEL:    Lab Results   Component Value Date    CHOL 206 06/06/2022    HDL 48 06/06/2022    TRIG 86 06/06/2022        Assessment:     CONSULTS:   IP CONSULT TO CARDIOLOGY  IP CONSULT TO CARDIAC REHAB  IP CONSULT TO CARDIAC REHAB    Patient has a CARDIOLOGY CONSULT: Yes        EDUCATION STATUS: Patient   [x]  Provided both written and verbal education on Cardiopulmonary Rehabilitation. []  Provided instructions for smoking cessation programs. [x]  Provided education of CAD risk factors. Discussed smoking cessation methods. Pt states he cannot resume Chantix at this time because they are not prescribing it for some reason. .  []  Provided recommendations on activity and exercise. []  Provided education on medications. []  Provided education on coronary anatomy and coronary interventions. []  Other:    CURRENT DIET: ADULT DIET; Regular; No Added Salt (3-4 gm)    EDUCATIONAL PACKETS PROVIDED- PRINTED FROM [employer]     Titles and material given:  Yes   [x]  Managing Heart Disease and Preventing Stroke  []  Heart Owner's Manual  []  Living Well with Heart Failure  []  Other:     PATIENT/CAREGIVER TEACHING:   Level of patient/caregiver understanding able to:   [x] Verbalize understanding   [] Demonstrate understanding       [] Teach back        [] Needs reinforcement     []  Other:       TEACHING TIME:  20 minutes       Plan:       DISCHARGE PLAN:  Placement for patient upon discharge: home with support   Hospice Care:  no  Code Status: Full Code  Discharge appointment scheduled: No     RECOMMENDATIONS:   []  Patient/Family instructed to call Cardiopulmonary Rehab ([phone]) upon discharge schedule the initial evaluation  []  Encourage to call Cardiopulmonary Rehabilitation with any questions. []  Referral to alternate Cardiopulmonary Rehabilitation facility. []  Other:    [] Appointment scheduled for   [x] Chooses to not schedule at this time: Pt not interested due to multiple orthopedic issues.  States he needs answers from doctors first.         Electronically signed by Ramses Erickson on 6/14/2022 at 1:14 PM

## 2022-06-14 NOTE — ED PROVIDER NOTES
[address] [facility]      Pt Name: Rowan Dang  MRN: [id number]  [place] 1960  Date of evaluation: 6/14/2022  Provider: Bradford Kevin MD    CHIEF COMPLAINT       Chief Complaint   Patient presents with    Chest Pain     started earlier in the day but resolved. Returned around 8 pm.        HISTORY OF PRESENT ILLNESS    Rowan Dang is a 57 y.o. male who presents to the emergency department with chest pain. Patient endorses substernal chest pain. Started just prior to arrival.  10 out of 10 sharp in nature. Radiates throughout his whole body. No shortness of breath. Never happened before. Started spontaneously. Constant in nature. No other associated symptoms. Nursing Notes were reviewed. Including nursing noted for FM, Surgical History, Past Medical History, Social History, vitals, and allergies; agree with all. REVIEW OF SYSTEMS       Review of Systems   Constitutional: Negative for activity change, appetite change, chills, diaphoresis, fatigue, fever and unexpected weight change. HENT: Negative for congestion, dental problem, drooling, ear discharge and ear pain. Eyes: Negative for photophobia, pain, discharge, redness, itching and visual disturbance. Respiratory: Negative for apnea, cough, choking, chest tightness, shortness of breath, wheezing and stridor. Cardiovascular: Positive for chest pain. Negative for palpitations and leg swelling. Gastrointestinal: Negative for abdominal distention, abdominal pain, anal bleeding, blood in stool, constipation, diarrhea, nausea, rectal pain and vomiting. Endocrine: Negative for cold intolerance and heat intolerance. Genitourinary: Negative for decreased urine volume and urgency. Musculoskeletal: Negative for arthralgias and back pain. Skin: Negative for color change and pallor. Neurological: Negative for tremors, facial asymmetry and weakness.    Hematological: Negative for adenopathy. Does not bruise/bleed easily. Psychiatric/Behavioral: Negative for agitation, behavioral problems, confusion and decreased concentration. Except as noted above the remainder of the review of systems was reviewed and negative.      PAST MEDICAL HISTORY     Past Medical History:   Diagnosis Date    AAA (abdominal aortic aneurysm) ([facility] 03/22/2021    Dr. Dejon Coronado Bilateral hand pain     Chronic left-sided low back pain with left-sided sciatica     Chronic nausea     Chronic seasonal allergic rhinitis due to pollen     DDD (degenerative disc disease), cervical     [facility] depression single episode[facility]     Tobacco abuse        SURGICAL HISTORY       Past Surgical History:   Procedure Laterality Date    BONE MARROW HARVEST  1994    Donor    WISDOM TOOTH EXTRACTION         CURRENT MEDICATIONS       Previous Medications    No medications on file       ALLERGIES     Codeine and Wellbutrin [bupropion]    FAMILY HISTORY        Family History   Problem Relation Age of Onset    Heart Disease Mother         triple bypass    Heart Surgery Mother         triple by pass 12     Other Father         disappeared in 5    Cancer Brother         Hodgkin's Lymphoma     High Blood Pressure Maternal Uncle        SOCIAL HISTORY       Social History     Socioeconomic History    Marital status:      Spouse name: None    Number of children: 2    Years of education: None    Highest education level: None   Occupational History    Occupation:     Tobacco Use    Smoking status: Current Every Day Smoker     Packs/day: 1.00     Years: 20.00     Pack years: 20.00     Types: Cigarettes    Smokeless tobacco: Never Used   Vaping Use    Vaping Use: Former   Substance and Sexual Activity    Alcohol use: Yes     Comment: Social     Drug use: No    Sexual activity: Not Currently     Partners: Female     Comment: wife   Other Topics Concern    None   Social discharge. Pupils: Pupils are equal, round, and reactive to light. Neck:      Thyroid: No thyromegaly. Trachea: No tracheal deviation. Cardiovascular:      Rate and Rhythm: Normal rate and regular rhythm. Heart sounds: No murmur heard. Pulmonary:      Breath sounds: No wheezing or rales. Chest:      Chest wall: No tenderness. Abdominal:      General: There is no distension. Palpations: Abdomen is soft. There is no mass. Tenderness: There is no abdominal tenderness. There is no guarding or rebound. Musculoskeletal:         General: No tenderness or deformity. Normal range of motion. Cervical back: Normal range of motion. Skin:     General: Skin is warm. Coloration: Skin is not pale. Findings: No erythema or rash. Neurological:      Mental Status: He is alert. Cranial Nerves: No cranial nerve deficit. Motor: No abnormal muscle tone.       Coordination: Coordination normal.         DIAGNOSTIC RESULTS     EKG: All EKG's are interpreted by the Emergency Department Physician who either signs or Co-signs this chart in the absence of acardiologist.    EKG inferior STEMI    RADIOLOGY:   Non-plain film images such as CT, Ultrasoundand MRI are read by the radiologist. Plain radiographic images are visualized and preliminarily interpreted by the emergency physician with the below findings:    X-ray reassuring    ED BEDSIDE ULTRASOUND:   Performed by ED Physician - none    LABS:  Labs Reviewed   BASIC METABOLIC PANEL W/ REFLEX TO MG FOR LOW K - Abnormal; Notable for the following components:       Result Value    Anion Gap 17 (*)     Glucose 128 (*)     All other components within normal limits   BRAIN NATRIURETIC PEPTIDE - Abnormal; Notable for the following components:    Pro- (*)     All other components within normal limits   CBC WITH AUTO DIFFERENTIAL   TROPONIN   LIPASE   HEPATIC FUNCTION PANEL   APTT   APTT   APTT       All other labs were withinnormal range or not returned as of this dictation. EMERGENCY DEPARTMENT COURSE and DIFFERENTIAL DIAGNOSIS/MDM:     PMH, Surgical Hx, FH, Social Hx reviewed by myself (ETOH usage, Tobacco usage, Drug usage reviewed by myself, no pertinent Hx)- No Pertinent Hx     Old records were reviewed by me    58-year-old with concern for STEMI. Inferior STEMI. Chest pain. Morphine. Heparin. Aspirin. Discussed with Dr. Mary Aquino will take to the Cath Lab. CRITICAL CARE TIME   Total Critical Caretime was 39 minutes, excluding separately reportable procedures. There was a high probability of clinically significant/life threatening deterioration in the patient's condition which required my urgent intervention.         PROCEDURES:  Unlessotherwise noted below, none    FINAL IMPRESSION      1. ST elevation myocardial infarction (STEMI), unspecified artery [facility])          DISPOSITION/PLAN   DISPOSITION Decision To Admit 06/14/2022 04:08:59 AM    (Please note that portions ofthis note were completed with a voice recognition program.  Efforts were made to edit the dictations but occasionally words are mis-transcribed.)    Trista Cage MD(electronically signed)  Attending Emergency Physician          Trista Cage MD  06/14/22 0370

## 2022-06-14 NOTE — PROGRESS NOTES
Lab called with critical troponin of 4. Notified Dr. Viry Tate. Will continue to monitor.   Electronically signed by Saurabh Miranda RN on 6/14/2022 at 10:10 AM

## 2022-06-14 NOTE — PROCEDURES
[address] [place] [address] Bob Feliz                             CARDIAC CATHETERIZATION    PATIENT NAME: Nghia Francisco                     :        1960  MED REC NO:   [id number]                          ROOM:       3508  ACCOUNT NO:   [de-identified]                           ADMIT DATE: 2022  PROVIDER:     Darvin Sosa MD    DATE OF PROCEDURE:  2022    INDICATION FOR PROCEDURE:  Acute inferior myocardial ischemia. The risks and benefits of the procedure were explained to the patient. Informed consent was obtained. He was brought emergently to the cardiac  catheterization lab after presenting with chest pain. He was diagnosed  with an acute myocardial infarction of the inferior wall. He is placed on the cardiac catheterization table and prepped and draped  in sterile fashion. Anesthesia was provided in the right groin with 1%  lidocaine injected subcutaneously and deep. The right common femoral  artery was accessed and cannulated. A 6-Jordanian sheath inserted using  Seldinger technique. Over the 0.035 J-wire, the JL-4 diagnostic catheter was advanced to the  ostium of the left main coronary artery. Coronary angiography was  performed to this system. Catheter was removed over the wire. Next,  the pigtail catheter was advanced over the wire into the left ventricle. Left ventricular end-diastolic pressure measurements were obtained, then  a power injection left ventriculogram was performed. Catheter was  flushed and placed back on pressure and then pulled back across the  aortic valve to assess for gradient. Catheter was removed over the  wire. Lastly, the [facility] 6-Jordanian guide catheter was advanced to the  ostium of the right coronary artery and coronary angiography was  performed. This was 100% occluded in the midportion.     We initially wired with a Runthrough 0.014 coronary wire; however, this  went into a marginal branch. We left this wire in place and then used a  Prowater 0.014 coronary wire to cross the 100% occlusion. We then  ballooned this with a 2.5 x 15 mm balloon. We then elected to stent it  with a 3.5 x 34 mm Willie drug-eluting stent. This was placed across the  lesion and inflated this to 3.5 mm in diameter. Stent balloon was  removed and the Runthrough wire had also been removed. Intraarterial  nitroglycerin was given. Repeat angiography demonstrated WANDA III flow  in the vessel with 0% residual stenosis. The coronary wire was removed  and final angiography was performed. The guide catheter was then  removed over the wire. Procedure was performed on IV unfractionated heparin. ACTs were checked  throughout the case to maintain an ACT greater than 250 seconds. The  patient had been given double bolus IV Integrilin and started on a drip. He was loaded with 180 mg of oral Brilinta at the conclusion of the  case. There were no complications. Estimated blood loss was less than  20 mL. Moderate sedation was administered by an independent agent at my  direction and supervision. The patient had an ASA grade of II and a  Mallampati score of II. Total duration of sedation was 37 minutes with  2 mg of IV Versed and 100 mcg of fentanyl. Vital signs were monitored. There were no complications from sedation. FINDINGS:  1. Right-dominant coronary arterial circulation. There was 100%  occlusion of the mid RCA. This was stented with a 3.5 x 34 mm Willie  drug-eluting stent dilated to 3.5 mm in diameter restoring WANDA III flow  with 0% residual stenosis. In the left system, there is no left main  disease. The left anterior descending artery has trivial plaque  disease. The circumflex artery has a proximal to mid 70% lesion that is  eccentric and mildly hazy. There is WANDA III flow in the vessel. 2.  Normal left ventricular systolic function.   LV ejection fraction

## 2022-06-14 NOTE — ED NOTES
Called to lobby for pt having chest pain. Pt back to triage. Attempted ekg multiple times pt having a hard time sitting still due to pain. Ekg showing stemi. Dr Jonas Garnica at bedside will attempt to get a clear ekg.        Tadeo Mas RN  06/14/22 6481

## 2022-06-15 VITALS
DIASTOLIC BLOOD PRESSURE: 74 MMHG | SYSTOLIC BLOOD PRESSURE: 109 MMHG | OXYGEN SATURATION: 95 % | HEART RATE: 82 BPM | HEIGHT: 66 IN | RESPIRATION RATE: 14 BRPM | TEMPERATURE: 97.5 F | BODY MASS INDEX: 19.66 KG/M2 | WEIGHT: 122.36 LBS

## 2022-06-15 LAB
CHOLESTEROL, TOTAL: 152 MG/DL (ref 0–199)
EKG ATRIAL RATE: 150 BPM
EKG DIAGNOSIS: NORMAL
EKG P AXIS: 81 DEGREES
EKG Q-T INTERVAL: 398 MS
EKG QRS DURATION: 76 MS
EKG QTC CALCULATION (BAZETT): 557 MS
EKG R AXIS: 99 DEGREES
EKG T AXIS: 101 DEGREES
EKG VENTRICULAR RATE: 118 BPM
HCT VFR BLD CALC: 37.6 % (ref 40.5–52.5)
HDLC SERPL-MCNC: 34 MG/DL (ref 40–60)
HEMOGLOBIN: 13.5 G/DL (ref 13.5–17.5)
LDL CHOLESTEROL CALCULATED: 100 MG/DL
MCH RBC QN AUTO: 32.9 PG (ref 26–34)
MCHC RBC AUTO-ENTMCNC: 35.8 G/DL (ref 31–36)
MCV RBC AUTO: 91.8 FL (ref 80–100)
PDW BLD-RTO: 14.1 % (ref 12.4–15.4)
PLATELET # BLD: 125 K/UL (ref 135–450)
PMV BLD AUTO: 8.7 FL (ref 5–10.5)
RBC # BLD: 4.1 M/UL (ref 4.2–5.9)
TRIGL SERPL-MCNC: 92 MG/DL (ref 0–150)
VLDLC SERPL CALC-MCNC: 18 MG/DL
WBC # BLD: 5.9 K/UL (ref 4–11)

## 2022-06-15 PROCEDURE — 6370000000 HC RX 637 (ALT 250 FOR IP): Performed by: INTERNAL MEDICINE

## 2022-06-15 PROCEDURE — 94760 N-INVAS EAR/PLS OXIMETRY 1: CPT

## 2022-06-15 PROCEDURE — 6360000002 HC RX W HCPCS: Performed by: INTERNAL MEDICINE

## 2022-06-15 PROCEDURE — 36415 COLL VENOUS BLD VENIPUNCTURE: CPT

## 2022-06-15 PROCEDURE — 80061 LIPID PANEL: CPT

## 2022-06-15 PROCEDURE — 99239 HOSP IP/OBS DSCHRG MGMT >30: CPT | Performed by: INTERNAL MEDICINE

## 2022-06-15 PROCEDURE — 85027 COMPLETE CBC AUTOMATED: CPT

## 2022-06-15 RX ORDER — METOPROLOL SUCCINATE 25 MG/1
25 TABLET, EXTENDED RELEASE ORAL DAILY
Qty: 30 TABLET | Refills: 3 | Status: SHIPPED | OUTPATIENT
Start: 2022-06-16 | End: 2022-09-09 | Stop reason: SDUPTHER

## 2022-06-15 RX ORDER — ASPIRIN 81 MG/1
81 TABLET, CHEWABLE ORAL DAILY
Qty: 30 TABLET | Refills: 3 | Status: SHIPPED | OUTPATIENT
Start: 2022-06-16 | End: 2022-09-09 | Stop reason: SDUPTHER

## 2022-06-15 RX ORDER — ROSUVASTATIN CALCIUM 40 MG/1
40 TABLET, COATED ORAL NIGHTLY
Qty: 30 TABLET | Refills: 3 | Status: SHIPPED | OUTPATIENT
Start: 2022-06-15 | End: 2022-09-09 | Stop reason: SDUPTHER

## 2022-06-15 RX ADMIN — TICAGRELOR 90 MG: 90 TABLET ORAL at 09:32

## 2022-06-15 RX ADMIN — METOPROLOL SUCCINATE 25 MG: 25 TABLET, EXTENDED RELEASE ORAL at 09:32

## 2022-06-15 RX ADMIN — ENOXAPARIN SODIUM 40 MG: 100 INJECTION SUBCUTANEOUS at 05:29

## 2022-06-15 RX ADMIN — ASPIRIN 81 MG 81 MG: 81 TABLET ORAL at 09:32

## 2022-06-15 RX ADMIN — TRAMADOL HYDROCHLORIDE 50 MG: 50 TABLET, COATED ORAL at 05:29

## 2022-06-15 ASSESSMENT — PAIN SCALES - GENERAL
PAINLEVEL_OUTOF10: 4
PAINLEVEL_OUTOF10: 6
PAINLEVEL_OUTOF10: 0

## 2022-06-15 ASSESSMENT — PAIN DESCRIPTION - DESCRIPTORS: DESCRIPTORS: DISCOMFORT

## 2022-06-15 ASSESSMENT — PAIN DESCRIPTION - ORIENTATION: ORIENTATION: POSTERIOR

## 2022-06-15 ASSESSMENT — PAIN DESCRIPTION - LOCATION: LOCATION: BACK

## 2022-06-15 NOTE — DISCHARGE SUMMARY
[address] [facility] Discharge Note      Patient ID: Ramon Jason 57 y.o. 1960    Admission Date: 6/14/2022     Discharge Date:  6/15/22    Reason for Admission:  Principal Diagnosis: Acute Inferior Myocardial Infarction  Secondary Diagnosis: Tobacco Abuse    Procedures:  Left Heart Catheterization with PCI to the mid RCA with IOANA  Telemetry Monitoring    Brief Summary of Patient's Course: The patient is 57 y.o. male with a past medical history significant for AAA, tobacco abuse and essential hypertension who presented to the [facility] ED with chest pain. The pain had been occurring for about 24 hours. intermittently.      He states it was difficult for him to differentiate this pain from his chronic neck pain. This was severe and had been constant since about 8pm last night. There was radiation of the pain into his neck and down his left arm. He was short of breath. There was no nausea. On presentation to the catheterization lab, he was still having chest pain that he rated a 10 out of 10 in severity. In the lab, he was found to have a 100% occlusion of his dominant mid RCA. This was successful intervened upon with a 3.5 x 34 mm Willie drug-eluting stent. He did well postprocedure with resolution of his chest pain and ECG changes. His LV systolic function was intact by LV gram.     He says he was seen in the office by Dr. Deborah Edmonds for a AAA 2 days ago. Karyna Garcia states that he is feeling better now with no further chest pain or shortness of breath. He is very anxious and wants to go home. Sinus rhythm on telemetry. Vitals:    06/15/22 0932   BP: 109/74   Pulse: 82   Resp: 14     RR, normal S1/S2, no M/R/G  Lungs bilaterally CTA  Abd soft, NT ND  No edema  No focal neurologic deficits  2+ bilateral radial and femoral pulses    Labs reviewed and unremarkable. Cath 6/14/2022:  1. Right-dominant coronary arterial circulation. There was 100%  occlusion of the mid RCA.   This was stented with a 3.5 x 34 mm [place]  drug-eluting stent dilated to 3.5 mm in diameter restoring WANDA III flow  with 0% residual stenosis. In the left system, there is no left main  disease. The left anterior descending artery has trivial plaque  disease. The circumflex artery has a proximal to mid 70% lesion that is  eccentric and mildly hazy. There is WANDA III flow in the vessel. 2.  Normal left ventricular systolic function. LV ejection fraction of  55%. 3.  Elevated left ventricular end-diastolic pressure at 20 mmHg. 4.  No gradient across the aortic valve on pullback to suggest aortic  stenosis.       Haley Costa will be discharged to home today. He will be on dual antiplatelet therapy with aspirin and Brilinta. He will leave here with a 30-day supply of Brilinta. He will also be on Toprol-XL for his recent infarct and rosuvastatin for hyperlipidemia. I strongly encouraged him in complete smoking cessation and told him this is what caused his heart attack. He will have a phase 2 cardiac rehab referral as an outpatient. We will see him in the office in 1 week. His LV systolic function is intact by LV gram.    Total time of discharge greater than 30 minutes. The patient was in good condition and stable at discharge. Discharge Instructions:   Medications    Activity Limitations:  No heavy lifting. Diet:  low sodium    Follow Up Instructions:   To office in: in 1 week  Instructed Re: Medication    Discharge Medications:     Medication List      START taking these medications    aspirin 81 MG chewable tablet  Take 1 tablet by mouth daily  Start taking on: June 16, 2022     metoprolol succinate 25 MG extended release tablet  Commonly known as: TOPROL XL  Take 1 tablet by mouth daily  Start taking on: June 16, 2022     rosuvastatin 40 MG tablet  Commonly known as: CRESTOR  Take 1 tablet by mouth nightly     ticagrelor 90 MG Tabs tablet  Commonly known as: BRILINTA  Take 1 tablet by mouth 2 times daily           Where to Get Your Medications      [facility] [id number] - f [phone]  [place] [id number]    Phone: [phone]   · aspirin 81 MG chewable tablet  · metoprolol succinate 25 MG extended release tablet  · rosuvastatin 40 MG tablet  · ticagrelor 90 MG Tabs tablet                                 Discharge Summary as above.      Attending Physician:   Sarah Naylor MD, MD, 6/15/2022, 9:45 AM

## 2022-06-15 NOTE — PLAN OF CARE
Problem: Pain  Goal: Verbalizes/displays adequate comfort level or baseline comfort level  Outcome: Progressing  Flowsheets  Taken 6/15/2022 0000  Verbalizes/displays adequate comfort level or baseline comfort level:   Encourage patient to monitor pain and request assistance   Assess pain using appropriate pain scale   Administer analgesics based on type and severity of pain and evaluate response   Notify Licensed Independent Practitioner if interventions unsuccessful or patient reports new pain   Implement non-pharmacological measures as appropriate and evaluate response   Consider cultural and social influences on pain and pain management  Taken 6/14/2022 2200  Verbalizes/displays adequate comfort level or baseline comfort level:   Encourage patient to monitor pain and request assistance   Administer analgesics based on type and severity of pain and evaluate response   Assess pain using appropriate pain scale   Implement non-pharmacological measures as appropriate and evaluate response   Consider cultural and social influences on pain and pain management   Notify Licensed Independent Practitioner if interventions unsuccessful or patient reports new pain  Taken 6/14/2022 2000  Verbalizes/displays adequate comfort level or baseline comfort level:   Encourage patient to monitor pain and request assistance   Assess pain using appropriate pain scale   Administer analgesics based on type and severity of pain and evaluate response   Implement non-pharmacological measures as appropriate and evaluate response   Consider cultural and social influences on pain and pain management   Notify Licensed Independent Practitioner if interventions unsuccessful or patient reports new pain

## 2022-06-15 NOTE — PLAN OF CARE
Problem: Pain  Goal: Verbalizes/displays adequate comfort level or baseline comfort level  6/15/2022 1030 by Alex Tristan RN  Outcome: Completed  6/15/2022 0551 by Javier Ugarte RN  Outcome: Progressing  Flowsheets  Taken 6/15/2022 0000  Verbalizes/displays adequate comfort level or baseline comfort level:   Encourage patient to monitor pain and request assistance   Assess pain using appropriate pain scale   Administer analgesics based on type and severity of pain and evaluate response   Notify Licensed Independent Practitioner if interventions unsuccessful or patient reports new pain   Implement non-pharmacological measures as appropriate and evaluate response   Consider cultural and social influences on pain and pain management  Taken 6/14/2022 2200  Verbalizes/displays adequate comfort level or baseline comfort level:   Encourage patient to monitor pain and request assistance   Administer analgesics based on type and severity of pain and evaluate response   Assess pain using appropriate pain scale   Implement non-pharmacological measures as appropriate and evaluate response   Consider cultural and social influences on pain and pain management   Notify Licensed Independent Practitioner if interventions unsuccessful or patient reports new pain  Taken 6/14/2022 2000  Verbalizes/displays adequate comfort level or baseline comfort level:   Encourage patient to monitor pain and request assistance   Assess pain using appropriate pain scale   Administer analgesics based on type and severity of pain and evaluate response   Implement non-pharmacological measures as appropriate and evaluate response   Consider cultural and social influences on pain and pain management   Notify Licensed Independent Practitioner if interventions unsuccessful or patient reports new pain     Problem: Skin/Tissue Integrity  Goal: Absence of new skin breakdown  Description: 1. Monitor for areas of redness and/or skin breakdown  2. Assess vascular access sites hourly  3. Every 4-6 hours minimum:  Change oxygen saturation probe site  4. Every 4-6 hours:  If on nasal continuous positive airway pressure, respiratory therapy assess nares and determine need for appliance change or resting period.   Outcome: Completed     Problem: Safety - Adult  Goal: Free from fall injury  Outcome: Completed     Problem: ABCDS Injury Assessment  Goal: Absence of physical injury  Outcome: Completed

## 2022-06-15 NOTE — PROGRESS NOTES
The [address] [place] [address], [address] [place] [address] [address] [address]  [phone]    PrimaryCare Doctor:  Marily Saleem DO  Primary Cardiologist: Dr. Valentino Dukes    Chief Complaint   Patient presents with    Follow-Up from Hospital     cc    Shortness of Breath     History of Present Illness:  Arthea Ahumada is a 57 y.o. male with PMH AAA, HTN, tobacco abuse. Patient was admitted to [facility] with acute IWMI. Underwent LHC/ successful PCI with IOANA to 100% occluded RCA. Patient presents to [facility] cardiology for follow up for CAD s/p LHC/ PCI. He has done well post procedure. He is not very active. He walks around his neighborhood. He admits to fatigue. Denies CP or SOB with activity. Cardiac rehab has been ordered upon discharge but he has not scheduled. He is unemployed. 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 Right groin site CDI. Denies LH, dizziness, palpitations, syncope. He is still smoking - plans on quitting today. Requests nicotine patch. Review of Systems:   General: Denies fever, chills  Skin: Denies skin changes, rash, itching, lesions.   HEENT: Denies headache, dizziness, vision changes, nosebleeds, sore throat, nasal drainage  RESP: Denies cough, sputum, dyspnea, wheeze, snoring  CARD: Denies palpitations,  murmur  GI:Denies nausea, vomiting, heartburn, loss of appetite, change in bowels  : Denies frequency, pain, incontinence, polyuria  VASC: Denies claudication, leg cramps, clots  MUSC/SKEL: Denies pain, stiffness, arthritis  PSYCH: Denies anxiety, depression, stress  NEURO: Denies numbness, tingling, weakness,change in mood or memory  HEME: Denies abn bruising, bleeding, anemia  ENDO: Denies intolerance to heat, cold, excessive thirst or hunger, hx thyroid disease    /74   Pulse 55   Ht 5' 6\" (1.676 m)   Wt 112 lb 12.8 oz (51.2 kg)   SpO2 98%   BMI 18.21 kg/m²   Wt Readings from Last 3 Encounters:   06/22/22 112 lb 12.8 oz (51.2 kg) 06/14/22 122 lb 5.7 oz (55.5 kg)   06/06/22 118 lb (53.5 kg)       Physical Exam:  GEN: Appears well, no acute distress  SKIN: Pink, warm, dry. Nails without clubbing. HEENT: PERRLA. Normocephalic, atraumatic. Neck supple. No adenopathy. LUNG: AP diameter normal. Clear bilateral. No wheeze, rales, or ronchi. Respiratory effort normal.  HEART: S1S2 A/R. No JVD. No carotid bruit. No murmur, rub or gallop. ABD: Soft, nontender. +BS X 4 quads. No hepatomegaly. EXT: Radial and pedal pulses 2+ and symmetric. Without varicosities. No edema. Right groin CDI. MUSCSKEL: Good ROM X4 extremities. No deformity. NEURO: A/O X3. Calm and cooperative. Past Medical History:   has a past medical history of AAA (abdominal aortic aneurysm) (HCC), Bilateral hand pain, Chronic left-sided low back pain with left-sided sciatica, Chronic nausea, Chronic seasonal allergic rhinitis due to pollen, DDD (degenerative disc disease), cervical, Essential hypertension, History of acute inferior wall MI, Major depression single episode, in partial remission ([facility] [address] and Tobacco abuse. Surgical History:   has a past surgical history that includes Bone Marrow [place] (1994) and [place] tooth extraction. Social History:   reports that he has been smoking cigarettes. He has a 20.00 pack-year smoking history. He has never used smokeless tobacco. He reports current alcohol use. He reports that he does not use drugs. Family History:   Family History   Problem Relation Age of Onset    Heart Disease Mother         triple bypass    Heart Surgery Mother         triple by pass 12     Other Father         disappeared in [facility]         Hodgkin's Lymphoma     High Blood Pressure Maternal Uncle        HomeMedications:  Prior to Admission medications    Medication Sig Start Date End Date Taking?  Authorizing Provider   aspirin 81 MG chewable tablet Take 1 tablet by mouth daily 6/16/22  Yes Emeterio Graham MD rosuvastatin (CRESTOR) 40 MG tablet Take 1 tablet by mouth nightly 6/15/22  Yes April Rich MD   metoprolol succinate (TOPROL XL) 25 MG extended release tablet Take 1 tablet by mouth daily 6/16/22  Yes April Rich MD   [facility] 90 MG TABS tablet Take 1 tablet by mouth 2 times daily 6/15/22  Yes April Rich MD        Allergies:  Codeine and Wellbutrin [bupropion]       LABS: Results reviewed with patient today. CBC:   Lab Results   Component Value Date    WBC 5.9 06/15/2022    WBC 9.3 06/14/2022    WBC 5.6 03/10/2021    RBC 4.10 06/15/2022    RBC 5.16 06/14/2022    RBC 4.26 03/10/2021    HGB 13.5 06/15/2022    HGB 16.6 06/14/2022    HGB 14.3 03/10/2021    HCT 37.6 06/15/2022    HCT 47.7 06/14/2022    HCT 41.0 03/10/2021    MCV 91.8 06/15/2022    MCV 92.6 06/14/2022    MCV 96.3 03/10/2021    RDW 14.1 06/15/2022    RDW 13.8 06/14/2022    RDW 13.9 03/10/2021     06/15/2022     06/14/2022     03/10/2021     BMP:  Lab Results   Component Value Date     06/14/2022     06/14/2022     06/06/2022    K 4.3 06/14/2022    K 4.0 06/14/2022    K 4.5 06/06/2022    K 5.0 03/10/2021    K 4.1 10/03/2018     06/14/2022    CL 99 06/14/2022     06/06/2022    CO2 18 06/14/2022    CO2 21 06/14/2022    CO2 21 06/06/2022    BUN 14 06/14/2022    BUN 17 06/14/2022    BUN 8 06/06/2022    CREATININE 0.8 06/14/2022    CREATININE 1.1 06/14/2022    CREATININE 1.0 06/06/2022     BNP:   Lab Results   Component Value Date    PROBNP 176 06/14/2022       Parameters:   > 450 pg/mL under age 48  > 900 pg/mL ages 54-65  > 1800 pg/mL over age 76    Iron Studies:  No results found for: TIBC, FERRITIN  GLUCOSE:   No results for input(s): GLUCOSE in the last 72 hours.   LIVER PROFILE:   Lab Results   Component Value Date    AST 25 06/14/2022    ALT 13 06/14/2022    LIPASE 26.0 06/14/2022    AMYLASE 50 11/09/2018    LABALBU 4.6 06/14/2022    BILIDIR <0.2 06/14/2022    BILITOT 0.4 06/14/2022    ALKPHOS 93 06/14/2022     PT/INR: No results found for: PROTIME, INR  Cardiac Enzymes:  Lab Results   Component Value Date    TROPONINI 4.21 06/14/2022     FASTING LIPID PANEL:  Lab Results   Component Value Date    CHOL 152 06/15/2022    HDL 34 06/15/2022    LDLCALC 100 06/15/2022    TRIG 92 06/15/2022     TSH:   Lab Results   Component Value Date    TSH 1.75 03/10/2021     Results for Marlena Kraft (MRN [id number]) as of 6/22/2022 14:50   Ref. Range 6/14/2022 09:02   Hemoglobin A1C Latest Ref Range: See comment % 5.5   eAG (mg/dL) Latest Units: mg/dL 111.2     Cardiac Imaging: Reports interpreted by me and reviewed with patient today. EKG:  Poor data quality, interpretation may be adversely affected  nsr with evidence of idiovetricular rhythm  Cannot rule out Anterior infarct , age undetermined  Abnormal ECG  When compared with ECG of 14-JUN-2022 04:07, (unconfirmed)  episodes of idioventricular rhythm is seen  ST less elevated in Inferior leads  ST no longer depressed in Anterior leads  Nonspecific T wave abnormality now evident in Inferior leads  T wave inversion no longer evident in Anterolateral leads . .. [facility] 6/14/2022:  1.  Right-dominant coronary arterial circulation.  There was 100%  occlusion of the mid RCA.  This was stented with a 3.5 x 34 mm Marsteller  drug-eluting stent dilated to 3.5 mm in diameter restoring WANDA III flow  with 0% residual stenosis.  In the left system, there is no left main  disease.  The left anterior descending artery has trivial plaque  disease.  The circumflex artery has a proximal to mid 70% lesion that is  eccentric and mildly hazy.  There is WANDA III flow in the vessel. 2.  Normal left ventricular systolic function.  LV ejection fraction of  55%. 3.  Elevated left ventricular end-diastolic pressure at 20 mmHg. 4.  No gradient across the aortic valve on pullback to suggest aortic  stenosis.     Assessment/Plan:      1.) CAD, Acute IWMI 6/14/2022 s/p LHC/PCI w IOANA to mid RCA. LCX with 70% prox to mid occlusion- WANDA Flow III. LVEF 55%. Stable. Cont GDMT. DAPT: asa, brilinta  Beta Blocker: Toprol XL  ACEi/ARB:none  Anti anginal:   Lipid management/high intensity statin: crestor  Risk factor management: high blood pressure, high cholesterol, Diabetes, smoking, obesity, family hx  Lifestyle modification: Heart healthy diet, regular exercise, weight loss, smoking cessation, stress reduction  Cardiac Rehab: Phase 2    2.) Hyperlipidemia:   LDL Goal < 70  Statin:  crestor          Low cholesterol diet  Liver fx 2 months after initiation then q6mos  Lipid panel annually    3.) Hypertension:   Goal BP <130/80. Met  Non pharmacologic interventions include:   -weight loss  -heart healthy low sodium and low fat diet that consist of mostly fruits, vegetables and grains (Dash diet)  -limited amount of alcohol (no more than 1 drink/day for women, 2 drinks/day for men)  -regular physical activity  -no smoking  -stress reduction    4.) Smoking addiction:   Smoking cessation counseling provided > 3 min. Benefits of smoking cessation include decrease risk for lung disease, heart disease, stroke, and peripheral vascular disease. Recommendations include medications such as Chantix, Zyban or nicotine replacement products, counseling and resources such as 1-800-QUIT-NOW. Nicotine patches ordered.     Instructions:     Heart Healthy Diet  Blood pressure control if  you have high blood pressure  Diabetic control if you have diabetes  Smoking cessation if you smoke  Weight loss if BMI >30  Regular exercise when OK per cardiac rehab  Stress Reduction    Call your Doctor if you have:  · Increased shortness of breath, weakness, or increased fatigue  · A fast or a slow heartbeat  · Problems or questions with your medications  · Feeling lightheaded or dizzy  · Unusual swelling of lower legs/feet, chest discomfort, unusual weakness or fatigue    I appreciate the opportunity of cooperating in the care of this individual.    JALEN Bass, APRN - CNP, CNP, 6/22/2022,2:24 PM

## 2022-06-15 NOTE — CARE COORDINATION
CASE MANAGEMENT DISCHARGE SUMMARY:No discharge Needs    DISCHARGE DATE: 6/15/22    DISCHARGED TO HOME     TRANSPORTATION: Family           Electronically signed by VICKI Story on 6/15/2022 at 10:20 AM

## 2022-06-15 NOTE — PROGRESS NOTES
Pt confirmed d/c paperwork has correct name. Discharge and education instructions reviewed with patient. Teach-back successful. Pt verbalized understanding and signed d/c papers. Pt denied questions at this time. No acute distress noted. Patient instructed to follow-up as noted - return to emergency department if symptoms worsen. Patient verbalized understanding. . Pt discharged to private vehicle with CVU RN. Patient stable upon departure. Thanked patient for choosing [facility]) for care.

## 2022-06-20 ENCOUNTER — TELEPHONE (OUTPATIENT)
Dept: CARDIOLOGY CLINIC | Age: 62
End: 2022-06-20

## 2022-06-20 NOTE — TELEPHONE ENCOUNTER
Called and spoke to José and let him know that it is okay to use the nicotine patches. He states that the chest tightness he has was when he woke up. He states that it is not pain and does not radiate. Advised him if he develops pain he should call the office back and if we are not here he should proceed to the emergency room for further evaluation. He verbalized understanding and confirmed appointment with Joanna TODD on 6/22.

## 2022-06-20 NOTE — TELEPHONE ENCOUNTER
Pt called he had a heart attack last week. Has a question about his smoking. With all the medications he has been put on since  The heart attack is it safe to wear the patch to stop smoking? Also is experiencing a tightening in his chest. Not painful just tight and is concerned.        [phone]

## 2022-06-22 ENCOUNTER — OFFICE VISIT (OUTPATIENT)
Dept: CARDIOLOGY CLINIC | Age: 62
End: 2022-06-22
Payer: COMMERCIAL

## 2022-06-22 VITALS
HEART RATE: 55 BPM | HEIGHT: 66 IN | BODY MASS INDEX: 18.13 KG/M2 | WEIGHT: 112.8 LBS | SYSTOLIC BLOOD PRESSURE: 114 MMHG | OXYGEN SATURATION: 98 % | DIASTOLIC BLOOD PRESSURE: 74 MMHG

## 2022-06-22 DIAGNOSIS — I21.19 ACUTE INFERIOR MYOCARDIAL INFARCTION (HCC): Primary | ICD-10-CM

## 2022-06-22 PROCEDURE — 93000 ELECTROCARDIOGRAM COMPLETE: CPT | Performed by: NURSE PRACTITIONER

## 2022-06-22 PROCEDURE — 4004F PT TOBACCO SCREEN RCVD TLK: CPT | Performed by: NURSE PRACTITIONER

## 2022-06-22 PROCEDURE — 1111F DSCHRG MED/CURRENT MED MERGE: CPT | Performed by: NURSE PRACTITIONER

## 2022-06-22 PROCEDURE — G8419 CALC BMI OUT NRM PARAM NOF/U: HCPCS | Performed by: NURSE PRACTITIONER

## 2022-06-22 PROCEDURE — 3017F COLORECTAL CA SCREEN DOC REV: CPT | Performed by: NURSE PRACTITIONER

## 2022-06-22 PROCEDURE — 99214 OFFICE O/P EST MOD 30 MIN: CPT | Performed by: NURSE PRACTITIONER

## 2022-06-22 PROCEDURE — G8427 DOCREV CUR MEDS BY ELIG CLIN: HCPCS | Performed by: NURSE PRACTITIONER

## 2022-06-22 RX ORDER — NITROGLYCERIN 0.4 MG/1
0.4 TABLET SUBLINGUAL EVERY 5 MIN PRN
Qty: 25 TABLET | Refills: 3 | Status: SHIPPED | OUTPATIENT
Start: 2022-06-22

## 2022-06-22 RX ORDER — NICOTINE 21-14-7MG
KIT TRANSDERMAL
Qty: 1 KIT | Refills: 0 | Status: SHIPPED | OUTPATIENT
Start: 2022-06-22

## 2022-06-22 NOTE — PATIENT INSTRUCTIONS
Heart Healthy Diet  Blood pressure control if  you have high blood pressure  Diabetic control if you have diabetes  Smoking cessation if you smoke  Weight loss if BMI >30  Regular exercise when OK per cardiac rehab  Stress Reduction    Call your Doctor if you have:  · Increased shortness of breath, weakness, or increased fatigue  · A fast or a slow heartbeat  · Problems or questions with your medications  · Feeling lightheaded or dizzy  · Unusual swelling of lower legs/feet, chest discomfort, unusual weakness or fatigue

## 2022-06-23 ENCOUNTER — HOSPITAL ENCOUNTER (OUTPATIENT)
Dept: CT IMAGING | Age: 62
Discharge: HOME | End: 2022-06-23
Payer: COMMERCIAL

## 2022-06-23 DIAGNOSIS — K55.1 MESENTERIC ARTERY STENOSIS (HCC): ICD-10-CM

## 2022-06-23 PROCEDURE — 74174 CTA ABD&PLVS W/CONTRAST: CPT

## 2022-06-23 PROCEDURE — 6360000004 HC RX CONTRAST MEDICATION: Performed by: STUDENT IN AN ORGANIZED HEALTH CARE EDUCATION/TRAINING PROGRAM

## 2022-06-23 RX ADMIN — IOPAMIDOL 75 ML: 755 INJECTION, SOLUTION INTRAVENOUS at 09:01

## 2022-07-13 ENCOUNTER — HOSPITAL ENCOUNTER (OUTPATIENT)
Dept: CARDIAC REHAB | Age: 62
Setting detail: THERAPIES SERIES
Discharge: HOME | End: 2022-07-13

## 2022-07-13 ENCOUNTER — TELEPHONE (OUTPATIENT)
Dept: CARDIOLOGY CLINIC | Age: 62
End: 2022-07-13

## 2022-07-13 DIAGNOSIS — I25.119 CORONARY ARTERY DISEASE INVOLVING NATIVE CORONARY ARTERY OF NATIVE HEART WITH ANGINA PECTORIS (HCC): Primary | ICD-10-CM

## 2022-07-13 DIAGNOSIS — R07.9 CHEST PAIN, UNSPECIFIED TYPE: ICD-10-CM

## 2022-07-13 NOTE — TELEPHONE ENCOUNTER
Called scheduling and spoke to Chen who stated that [address] is pushed out till 8/8, called Zeinab Dwyer RN and she said it needs to be sooner and will put it in STAT. Chen called the department and had to leave a message.  She said once she hears back from them, she will call me back

## 2022-07-13 NOTE — TELEPHONE ENCOUNTER
Dr. Samantha Maurice,     Please review in Dr. Doris moreno. Pvaan Parry from Cardiac Rehab called to report that the patient is reporting to her that his midsternal chest pain radiating across both sides, never completely resolved s/p MI 6/14/22. He reports continues to be intermittent, occurring with both rest and exertion, rates 2-3/10 pain scale, Took SL nitro once with relief but will not take due to headache. S/p PCI mid RCA IOANA, circumflex artery proximal to mid 70% lesion, eccentric, mildly hazy, WANDA III flow per Dr. Irina Julian. Current medical therapy Brilinta, ASA, TOprol-XL, Crestor. Cardiac rehab on hold until further review. Stress study to assess or addition of Ranexa or Imdur?

## 2022-07-13 NOTE — PROGRESS NOTES
Patient arrived at [phone] for his initial cardiac rehab appointment for orientation and six minute walk test. Consents reviewed and signed per patient. Oriented to the program structure and COVID precautions. During conversation patient reveals that he continues to experience \"chest pain\" daily. He describes mid sternal discomfort that radiates across his chest, rating it a 2-3 out of 10. He states that the discomfort occurs with activity such as walking, but he has also experienced it at rest. He has taken SL NTG with relief but complained of a headache following the NTG. He reveals that he has not called his cardiology office to discuss his symptoms and states that he is not \"sure\" if he reported it during his most recent office visit. He admits that it is \"very concerning\" to him and he planned to discuss today during this appointment. Call placed to Dr. Janice Willis office and spoke with Anders Marquez RN to report the patient's symptoms. She will discuss with MD and contact the patient. Rehab session for today cancelled and patient given contact information to call to reschedule. Patient instructed that MD office will contact him for further instructions and he verbalizes understanding.

## 2022-07-14 NOTE — TELEPHONE ENCOUNTER
Called pt explained test time and that the order did not need to be changed to STAT on 7/19 Per Kathryn Davis in [address] was ok to leave time as is on schedule. Went over all preps with pt.

## 2022-07-19 ENCOUNTER — HOSPITAL ENCOUNTER (OUTPATIENT)
Dept: NON INVASIVE DIAGNOSTICS | Age: 62
Discharge: HOME | End: 2022-07-19
Payer: COMMERCIAL

## 2022-07-19 DIAGNOSIS — I25.119 CORONARY ARTERY DISEASE INVOLVING NATIVE CORONARY ARTERY OF NATIVE HEART WITH ANGINA PECTORIS (HCC): ICD-10-CM

## 2022-07-19 DIAGNOSIS — R07.9 CHEST PAIN, UNSPECIFIED TYPE: ICD-10-CM

## 2022-07-19 LAB
LV EF: 69 %
LVEF MODALITY: NORMAL

## 2022-07-19 PROCEDURE — 93017 CV STRESS TEST TRACING ONLY: CPT

## 2022-07-19 PROCEDURE — 6360000002 HC RX W HCPCS: Performed by: INTERNAL MEDICINE

## 2022-07-19 PROCEDURE — A9502 TC99M TETROFOSMIN: HCPCS | Performed by: INTERNAL MEDICINE

## 2022-07-19 PROCEDURE — 78452 HT MUSCLE IMAGE SPECT MULT: CPT

## 2022-07-19 PROCEDURE — 3430000000 HC RX DIAGNOSTIC RADIOPHARMACEUTICAL: Performed by: INTERNAL MEDICINE

## 2022-07-19 RX ADMIN — REGADENOSON 0.4 MG: 0.08 INJECTION, SOLUTION INTRAVENOUS at 10:30

## 2022-07-19 RX ADMIN — TETROFOSMIN 10 MILLICURIE: 1.38 INJECTION, POWDER, LYOPHILIZED, FOR SOLUTION INTRAVENOUS at 09:14

## 2022-07-19 RX ADMIN — TETROFOSMIN 30 MILLICURIE: 1.38 INJECTION, POWDER, LYOPHILIZED, FOR SOLUTION INTRAVENOUS at 10:35

## 2022-07-21 ENCOUNTER — OFFICE VISIT (OUTPATIENT)
Dept: FAMILY MEDICINE CLINIC | Age: 62
End: 2022-07-21
Payer: COMMERCIAL

## 2022-07-21 VITALS
HEIGHT: 66 IN | SYSTOLIC BLOOD PRESSURE: 134 MMHG | DIASTOLIC BLOOD PRESSURE: 72 MMHG | BODY MASS INDEX: 18.32 KG/M2 | WEIGHT: 114 LBS

## 2022-07-21 DIAGNOSIS — K21.9 GERD WITHOUT ESOPHAGITIS: ICD-10-CM

## 2022-07-21 DIAGNOSIS — Z23 NEED FOR PNEUMOCOCCAL VACCINATION: ICD-10-CM

## 2022-07-21 DIAGNOSIS — R07.2 PRECORDIAL CHEST PAIN: ICD-10-CM

## 2022-07-21 DIAGNOSIS — R11.0 CHRONIC NAUSEA: Primary | ICD-10-CM

## 2022-07-21 DIAGNOSIS — I25.2 HISTORY OF ACUTE INFERIOR WALL MI: ICD-10-CM

## 2022-07-21 DIAGNOSIS — Z72.0 TOBACCO ABUSE: ICD-10-CM

## 2022-07-21 PROCEDURE — G8427 DOCREV CUR MEDS BY ELIG CLIN: HCPCS | Performed by: FAMILY MEDICINE

## 2022-07-21 PROCEDURE — 3017F COLORECTAL CA SCREEN DOC REV: CPT | Performed by: FAMILY MEDICINE

## 2022-07-21 PROCEDURE — G8419 CALC BMI OUT NRM PARAM NOF/U: HCPCS | Performed by: FAMILY MEDICINE

## 2022-07-21 PROCEDURE — 4004F PT TOBACCO SCREEN RCVD TLK: CPT | Performed by: FAMILY MEDICINE

## 2022-07-21 PROCEDURE — 99213 OFFICE O/P EST LOW 20 MIN: CPT | Performed by: FAMILY MEDICINE

## 2022-07-21 RX ORDER — OMEPRAZOLE 20 MG/1
20 CAPSULE, DELAYED RELEASE ORAL
Qty: 14 CAPSULE | Refills: 0 | Status: SHIPPED | OUTPATIENT
Start: 2022-07-21 | End: 2022-08-05

## 2022-07-21 ASSESSMENT — ENCOUNTER SYMPTOMS
VOMITING: 0
NAUSEA: 1
TROUBLE SWALLOWING: 0

## 2022-07-21 NOTE — PROGRESS NOTES
Subjective:      Patient ID: Earl Hurley is a 57 y.o. male. HPI    Chronic Nausea:  Patient has a history of chronic intermittent nausea that has not responded to Protonix and Carafate or Zofran. His work-up has included and EGD with biopsy that was normal.  He has also had a normal abdominal CT and Colonoscopy. His amylase and lipase were normal.  He continues to have chronic intermittent nausea. He never vomits with it. I had previously offered treatment with Elavil but he declined. He was referred to Dr. Dorys Eckert at his visit on 3-4-21 for further evaluation. He is now seeing Dr. Alfonso Murguia and is being tested for possible Celiac Artery Disease. The testing was done but he has not received results yet. History of MI:  Patient was admitted with an acute Inferior Wall MI on 6-14-22. He underwent stenting of the RCA with IOANA. He was discharged the following day. He sees Dr. Inder Low. He now takes Brilinta 90 mg BID, Toprol XL 25 mg daily, Crestor 40 mg daily and ASA 81 mg daily. He has nitrostat prn. He continues to complains of chest pains. He underwent stress testing 7-19-22 and was told that the pain is not coming from the heart. He denies dyspnea, diaphoresis, nausea or radiation of the pains. He has only taken 1 SL nitro but got a severe headache. Tobacco Abuse:  Patient continues to smoke but has decreased to 1/2 ppd. He previously did not tolerate Chantix due to agitation. Review of Systems   Constitutional:  Negative for chills and fever. HENT:  Negative for trouble swallowing. Cardiovascular:  Negative for chest pain, palpitations and leg swelling. Gastrointestinal:  Positive for nausea. Negative for vomiting. /72   Ht 5' 6\" (1.676 m)   Wt 114 lb (51.7 kg)   BMI 18.40 kg/m²    Objective:   Physical Exam  Vitals reviewed. Constitutional:       General: He is not in acute distress. Appearance: He is well-developed. HENT:      Head: Normocephalic. Right Ear: External ear normal.      Left Ear: External ear normal.   Neck:      Thyroid: No thyromegaly. Vascular: No carotid bruit or JVD. Cardiovascular:      Rate and Rhythm: Normal rate and regular rhythm. Heart sounds: Normal heart sounds. No murmur heard. Pulmonary:      Effort: Pulmonary effort is normal.      Breath sounds: Normal breath sounds. No wheezing or rales. Lymphadenopathy:      Cervical: No cervical adenopathy. Neurological:      Mental Status: He is alert and oriented to person, place, and time. Assessment:      Chronic Nausea  History of MI  Tobacco Abuse   Precordial Chest Pain - Likely GERD      Plan:      Rx Omeprazole 20 mg once daily twice daily for 2 weeks. Prevnar 20 shot postponed by patient.    Low Dose Lung CT ordered   RTO as needed         [id number] Bhavana Swartz DO

## 2022-08-05 ENCOUNTER — TELEPHONE (OUTPATIENT)
Dept: FAMILY MEDICINE CLINIC | Age: 62
End: 2022-08-05

## 2022-08-05 RX ORDER — FAMOTIDINE 20 MG/1
20 TABLET, FILM COATED ORAL 2 TIMES DAILY
Qty: 60 TABLET | Refills: 0 | Status: SHIPPED | OUTPATIENT
Start: 2022-08-05

## 2022-08-05 NOTE — TELEPHONE ENCOUNTER
Long term use of Omeprazole has potential consequences. I have sent an Rx for Pepcid twice daily to his pharmacy. As for the swollen gland, he should apply low heat and see me next week if it does not resolve.

## 2022-08-05 NOTE — TELEPHONE ENCOUNTER
Patient requesting med refill on Omeprazole. States it is helping and he was only given a 2 week supply. Patient also states he has a swollen gland in his right neck that is painful and making the right side of his throat hurt. Patient requesting a returned call.

## 2022-08-22 ENCOUNTER — HOSPITAL ENCOUNTER (OUTPATIENT)
Dept: CT IMAGING | Age: 62
Discharge: HOME | End: 2022-08-22
Payer: MEDICARE

## 2022-08-22 DIAGNOSIS — Z72.0 TOBACCO ABUSE: ICD-10-CM

## 2022-08-22 PROCEDURE — 71271 CT THORAX LUNG CANCER SCR C-: CPT

## 2022-08-31 ENCOUNTER — HOSPITAL ENCOUNTER (OUTPATIENT)
Dept: CARDIAC REHAB | Age: 62
Setting detail: THERAPIES SERIES
Discharge: HOME | End: 2022-08-31
Payer: MEDICARE

## 2022-08-31 ENCOUNTER — TELEPHONE (OUTPATIENT)
Dept: CASE MANAGEMENT | Age: 62
End: 2022-08-31

## 2022-08-31 PROCEDURE — 93798 PHYS/QHP OP CAR RHAB W/ECG: CPT

## 2022-09-02 ENCOUNTER — HOSPITAL ENCOUNTER (OUTPATIENT)
Dept: CARDIAC REHAB | Age: 62
Setting detail: THERAPIES SERIES
Discharge: HOME | End: 2022-09-02
Payer: MEDICARE

## 2022-09-02 PROCEDURE — 93798 PHYS/QHP OP CAR RHAB W/ECG: CPT

## 2022-09-05 ENCOUNTER — APPOINTMENT (OUTPATIENT)
Dept: CARDIAC REHAB | Age: 62
End: 2022-09-05
Payer: MEDICARE

## 2022-09-07 ENCOUNTER — TELEPHONE (OUTPATIENT)
Dept: CARDIOLOGY CLINIC | Age: 62
End: 2022-09-07

## 2022-09-07 ENCOUNTER — HOSPITAL ENCOUNTER (OUTPATIENT)
Dept: CARDIAC REHAB | Age: 62
Setting detail: THERAPIES SERIES
Discharge: HOME | End: 2022-09-07
Payer: MEDICARE

## 2022-09-07 PROCEDURE — 93798 PHYS/QHP OP CAR RHAB W/ECG: CPT

## 2022-09-07 NOTE — TELEPHONE ENCOUNTER
Pt would like to be called to confirm his current medication list. He is not sure which ones he is still suppose to be on and he is about to run out. He believes they are the [address] [place] [address]? ?        His pharmacy is [facility] [id number] [facility] [id number] [facility]       Sunday Jarad # [phone]

## 2022-09-09 ENCOUNTER — HOSPITAL ENCOUNTER (OUTPATIENT)
Dept: CARDIAC REHAB | Age: 62
Setting detail: THERAPIES SERIES
Discharge: HOME | End: 2022-09-09
Payer: MEDICARE

## 2022-09-09 PROCEDURE — 93798 PHYS/QHP OP CAR RHAB W/ECG: CPT

## 2022-09-09 RX ORDER — ROSUVASTATIN CALCIUM 40 MG/1
40 TABLET, COATED ORAL NIGHTLY
Qty: 90 TABLET | Refills: 3 | Status: SHIPPED | OUTPATIENT
Start: 2022-09-09

## 2022-09-09 RX ORDER — ASPIRIN 81 MG/1
81 TABLET, CHEWABLE ORAL DAILY
Qty: 90 TABLET | Refills: 3 | Status: SHIPPED | OUTPATIENT
Start: 2022-09-09

## 2022-09-09 RX ORDER — METOPROLOL SUCCINATE 25 MG/1
25 TABLET, EXTENDED RELEASE ORAL DAILY
Qty: 90 TABLET | Refills: 3 | Status: SHIPPED | OUTPATIENT
Start: 2022-09-09

## 2022-09-09 NOTE — TELEPHONE ENCOUNTER
Pt returned call and states he needs a refill on pending medications below. Patient state he is not to sure if he is supposed to be on [place]. Pt states he has not been taking his Brillinta since some time in June. Looks like pt was discharged on 06/15/2022 with Brilinta and asa. Azael Gusman was sent to pharmacy with 60 tabs and 5 refills. Please advise. Kanwal Javier will be discharged to home today. He will be on dual antiplatelet therapy with aspirin and Brilinta. ''     -Dr. Irina Julian

## 2022-09-12 ENCOUNTER — HOSPITAL ENCOUNTER (OUTPATIENT)
Dept: CARDIAC REHAB | Age: 62
Setting detail: THERAPIES SERIES
Discharge: HOME | End: 2022-09-12
Payer: MEDICARE

## 2022-09-12 PROCEDURE — 93798 PHYS/QHP OP CAR RHAB W/ECG: CPT

## 2022-09-14 ENCOUNTER — HOSPITAL ENCOUNTER (OUTPATIENT)
Dept: CARDIAC REHAB | Age: 62
Setting detail: THERAPIES SERIES
Discharge: HOME | End: 2022-09-14
Payer: MEDICARE

## 2022-09-14 PROCEDURE — 93798 PHYS/QHP OP CAR RHAB W/ECG: CPT

## 2022-09-16 ENCOUNTER — HOSPITAL ENCOUNTER (OUTPATIENT)
Dept: CARDIAC REHAB | Age: 62
Setting detail: THERAPIES SERIES
Discharge: HOME | End: 2022-09-16
Payer: MEDICARE

## 2022-09-16 PROCEDURE — 93798 PHYS/QHP OP CAR RHAB W/ECG: CPT

## 2022-09-19 ENCOUNTER — HOSPITAL ENCOUNTER (OUTPATIENT)
Dept: CARDIAC REHAB | Age: 62
Setting detail: THERAPIES SERIES
Discharge: HOME | End: 2022-09-19
Payer: MEDICARE

## 2022-09-19 PROCEDURE — 93798 PHYS/QHP OP CAR RHAB W/ECG: CPT

## 2022-09-21 ENCOUNTER — HOSPITAL ENCOUNTER (OUTPATIENT)
Dept: CARDIAC REHAB | Age: 62
Setting detail: THERAPIES SERIES
Discharge: HOME | End: 2022-09-21
Payer: MEDICARE

## 2022-09-21 PROCEDURE — 93798 PHYS/QHP OP CAR RHAB W/ECG: CPT

## 2022-09-23 ENCOUNTER — APPOINTMENT (OUTPATIENT)
Dept: CARDIAC REHAB | Age: 62
End: 2022-09-23
Payer: MEDICARE

## 2022-09-26 ENCOUNTER — HOSPITAL ENCOUNTER (OUTPATIENT)
Dept: CARDIAC REHAB | Age: 62
Setting detail: THERAPIES SERIES
Discharge: HOME | End: 2022-09-26
Payer: MEDICARE

## 2022-09-26 PROCEDURE — 93798 PHYS/QHP OP CAR RHAB W/ECG: CPT

## 2022-09-28 ENCOUNTER — HOSPITAL ENCOUNTER (OUTPATIENT)
Dept: CARDIAC REHAB | Age: 62
Setting detail: THERAPIES SERIES
Discharge: HOME | End: 2022-09-28
Payer: MEDICARE

## 2022-09-28 PROCEDURE — 93798 PHYS/QHP OP CAR RHAB W/ECG: CPT

## 2022-09-30 ENCOUNTER — HOSPITAL ENCOUNTER (OUTPATIENT)
Dept: CARDIAC REHAB | Age: 62
Setting detail: THERAPIES SERIES
Discharge: HOME | End: 2022-09-30
Payer: MEDICARE

## 2022-09-30 PROCEDURE — 93798 PHYS/QHP OP CAR RHAB W/ECG: CPT

## 2022-10-03 ENCOUNTER — HOSPITAL ENCOUNTER (OUTPATIENT)
Dept: CARDIAC REHAB | Age: 62
Setting detail: THERAPIES SERIES
Discharge: HOME | End: 2022-10-03
Payer: MEDICARE

## 2022-10-03 PROCEDURE — 93798 PHYS/QHP OP CAR RHAB W/ECG: CPT

## 2022-10-05 ENCOUNTER — HOSPITAL ENCOUNTER (OUTPATIENT)
Dept: CARDIAC REHAB | Age: 62
Setting detail: THERAPIES SERIES
Discharge: HOME | End: 2022-10-05
Payer: MEDICARE

## 2022-10-05 PROCEDURE — 93798 PHYS/QHP OP CAR RHAB W/ECG: CPT

## 2022-10-07 ENCOUNTER — HOSPITAL ENCOUNTER (OUTPATIENT)
Dept: CARDIAC REHAB | Age: 62
Setting detail: THERAPIES SERIES
Discharge: HOME | End: 2022-10-07
Payer: MEDICARE

## 2022-10-07 PROCEDURE — 93798 PHYS/QHP OP CAR RHAB W/ECG: CPT

## 2022-10-10 ENCOUNTER — HOSPITAL ENCOUNTER (OUTPATIENT)
Dept: CARDIAC REHAB | Age: 62
Setting detail: THERAPIES SERIES
Discharge: HOME | End: 2022-10-10
Payer: MEDICARE

## 2022-10-10 PROCEDURE — 93798 PHYS/QHP OP CAR RHAB W/ECG: CPT

## 2022-10-12 ENCOUNTER — HOSPITAL ENCOUNTER (OUTPATIENT)
Dept: CARDIAC REHAB | Age: 62
Setting detail: THERAPIES SERIES
Discharge: HOME | End: 2022-10-12
Payer: MEDICARE

## 2022-10-12 PROCEDURE — 93798 PHYS/QHP OP CAR RHAB W/ECG: CPT

## 2022-10-14 ENCOUNTER — HOSPITAL ENCOUNTER (OUTPATIENT)
Dept: CARDIAC REHAB | Age: 62
Setting detail: THERAPIES SERIES
Discharge: HOME | End: 2022-10-14
Payer: MEDICARE

## 2022-10-14 PROCEDURE — 93798 PHYS/QHP OP CAR RHAB W/ECG: CPT

## 2022-10-19 ENCOUNTER — APPOINTMENT (OUTPATIENT)
Dept: CARDIAC REHAB | Age: 62
End: 2022-10-19
Payer: MEDICARE

## 2022-10-21 ENCOUNTER — HOSPITAL ENCOUNTER (OUTPATIENT)
Dept: CARDIAC REHAB | Age: 62
Setting detail: THERAPIES SERIES
Discharge: HOME | End: 2022-10-21
Payer: MEDICARE

## 2022-10-21 PROCEDURE — 93798 PHYS/QHP OP CAR RHAB W/ECG: CPT

## 2022-10-24 ENCOUNTER — HOSPITAL ENCOUNTER (OUTPATIENT)
Dept: CARDIAC REHAB | Age: 62
Setting detail: THERAPIES SERIES
Discharge: HOME | End: 2022-10-24
Payer: MEDICARE

## 2022-10-24 PROCEDURE — 93798 PHYS/QHP OP CAR RHAB W/ECG: CPT

## 2022-10-26 ENCOUNTER — HOSPITAL ENCOUNTER (OUTPATIENT)
Dept: CARDIAC REHAB | Age: 62
Setting detail: THERAPIES SERIES
Discharge: HOME | End: 2022-10-26
Payer: MEDICARE

## 2022-10-26 PROCEDURE — 93798 PHYS/QHP OP CAR RHAB W/ECG: CPT

## 2022-10-28 ENCOUNTER — HOSPITAL ENCOUNTER (OUTPATIENT)
Dept: CARDIAC REHAB | Age: 62
Setting detail: THERAPIES SERIES
Discharge: HOME | End: 2022-10-28
Payer: MEDICARE

## 2022-10-28 PROCEDURE — 93798 PHYS/QHP OP CAR RHAB W/ECG: CPT

## 2022-10-31 ENCOUNTER — HOSPITAL ENCOUNTER (OUTPATIENT)
Dept: CARDIAC REHAB | Age: 62
Setting detail: THERAPIES SERIES
Discharge: HOME | End: 2022-10-31
Payer: MEDICARE

## 2022-10-31 PROCEDURE — 93798 PHYS/QHP OP CAR RHAB W/ECG: CPT

## 2022-11-04 ENCOUNTER — HOSPITAL ENCOUNTER (OUTPATIENT)
Dept: CARDIAC REHAB | Age: 62
Setting detail: THERAPIES SERIES
Discharge: HOME | End: 2022-11-04
Payer: MEDICARE

## 2022-11-04 PROCEDURE — 93798 PHYS/QHP OP CAR RHAB W/ECG: CPT

## 2022-11-07 ENCOUNTER — HOSPITAL ENCOUNTER (OUTPATIENT)
Dept: CARDIAC REHAB | Age: 62
Setting detail: THERAPIES SERIES
Discharge: HOME | End: 2022-11-07
Payer: MEDICARE

## 2022-11-07 PROCEDURE — 93798 PHYS/QHP OP CAR RHAB W/ECG: CPT

## 2022-11-09 ENCOUNTER — APPOINTMENT (OUTPATIENT)
Dept: CARDIAC REHAB | Age: 62
End: 2022-11-09
Payer: MEDICARE

## 2022-11-11 ENCOUNTER — HOSPITAL ENCOUNTER (OUTPATIENT)
Dept: CARDIAC REHAB | Age: 62
Setting detail: THERAPIES SERIES
Discharge: HOME | End: 2022-11-11
Payer: MEDICARE

## 2022-11-11 PROCEDURE — 93798 PHYS/QHP OP CAR RHAB W/ECG: CPT

## 2022-11-14 ENCOUNTER — HOSPITAL ENCOUNTER (OUTPATIENT)
Dept: CARDIAC REHAB | Age: 62
Setting detail: THERAPIES SERIES
Discharge: HOME | End: 2022-11-14
Payer: MEDICARE

## 2022-11-14 PROCEDURE — 93798 PHYS/QHP OP CAR RHAB W/ECG: CPT

## 2022-11-16 ENCOUNTER — HOSPITAL ENCOUNTER (OUTPATIENT)
Dept: CARDIAC REHAB | Age: 62
Setting detail: THERAPIES SERIES
Discharge: HOME | End: 2022-11-16
Payer: MEDICARE

## 2022-11-16 PROCEDURE — 93798 PHYS/QHP OP CAR RHAB W/ECG: CPT

## 2022-11-18 ENCOUNTER — APPOINTMENT (OUTPATIENT)
Dept: CT IMAGING | Age: 62
End: 2022-11-18
Payer: MEDICARE

## 2022-11-18 ENCOUNTER — APPOINTMENT (OUTPATIENT)
Dept: CARDIAC REHAB | Age: 62
End: 2022-11-18
Payer: MEDICARE

## 2022-11-18 ENCOUNTER — HOSPITAL ENCOUNTER (EMERGENCY)
Age: 62
Discharge: HOME | End: 2022-11-18
Payer: MEDICARE

## 2022-11-18 VITALS
DIASTOLIC BLOOD PRESSURE: 78 MMHG | WEIGHT: 119.93 LBS | OXYGEN SATURATION: 100 % | TEMPERATURE: 97 F | SYSTOLIC BLOOD PRESSURE: 114 MMHG | HEIGHT: 66 IN | RESPIRATION RATE: 16 BRPM | HEART RATE: 79 BPM | BODY MASS INDEX: 19.27 KG/M2

## 2022-11-18 DIAGNOSIS — S09.90XA INJURY OF HEAD, INITIAL ENCOUNTER: ICD-10-CM

## 2022-11-18 DIAGNOSIS — T14.8XXA SUTURE OF SKIN WOUND: Primary | ICD-10-CM

## 2022-11-18 DIAGNOSIS — S01.81XA FACIAL LACERATION, INITIAL ENCOUNTER: ICD-10-CM

## 2022-11-18 PROCEDURE — 90471 IMMUNIZATION ADMIN: CPT | Performed by: NURSE PRACTITIONER

## 2022-11-18 PROCEDURE — 6360000002 HC RX W HCPCS: Performed by: NURSE PRACTITIONER

## 2022-11-18 PROCEDURE — 12032 INTMD RPR S/A/T/EXT 2.6-7.5: CPT

## 2022-11-18 PROCEDURE — 2500000003 HC RX 250 WO HCPCS: Performed by: NURSE PRACTITIONER

## 2022-11-18 PROCEDURE — 70450 CT HEAD/BRAIN W/O DYE: CPT

## 2022-11-18 PROCEDURE — 99284 EMERGENCY DEPT VISIT MOD MDM: CPT

## 2022-11-18 PROCEDURE — 90715 TDAP VACCINE 7 YRS/> IM: CPT | Performed by: NURSE PRACTITIONER

## 2022-11-18 RX ADMIN — LIDOCAINE HYDROCHLORIDE 10 ML: 10; .005 INJECTION, SOLUTION EPIDURAL; INFILTRATION; INTRACAUDAL; PERINEURAL at 10:13

## 2022-11-18 RX ADMIN — TETANUS TOXOID, REDUCED DIPHTHERIA TOXOID AND ACELLULAR PERTUSSIS VACCINE, ADSORBED 0.5 ML: 5; 2.5; 8; 8; 2.5 SUSPENSION INTRAMUSCULAR at 10:21

## 2022-11-18 ASSESSMENT — LIFESTYLE VARIABLES
HOW MANY STANDARD DRINKS CONTAINING ALCOHOL DO YOU HAVE ON A TYPICAL DAY: 3 OR 4
HOW OFTEN DO YOU HAVE A DRINK CONTAINING ALCOHOL: 2-4 TIMES A MONTH

## 2022-11-18 ASSESSMENT — PAIN - FUNCTIONAL ASSESSMENT: PAIN_FUNCTIONAL_ASSESSMENT: 0-10

## 2022-11-18 ASSESSMENT — PAIN SCALES - GENERAL: PAINLEVEL_OUTOF10: 4

## 2022-11-18 ASSESSMENT — PAIN DESCRIPTION - LOCATION: LOCATION: FACE

## 2022-11-18 NOTE — ED TRIAGE NOTES
Hit head on doorframe while walking in dark. Laceration above right eye. Alert and oriented x 4. Sitting up with easy breathing.

## 2022-11-18 NOTE — ED PROVIDER NOTES
[address]  [address] [address]  Dept: [phone]  Loc: [address] ENCOUNTER        This patient was not seen or evaluated by the attending physician. I evaluated this patient, the attending physician was available for consultation. CHIEF COMPLAINT    Chief Complaint   Patient presents with    Facial Laceration     Hit face on doorframe at night. Laceration above right eye       HPI    Yamila Barcenas is a 57 y.o. male who presents with head injury with associated right eyebrow laceration. The mechanism of the injury was that he states that he was walking in the dark and he hit his face on a door frame as he did not see it in the dark. This occurred a few hours prior to arrival.  Associated bleeding was alleviated by pressure, but he states every time he takes the bandage off it starts oozing again. The pain is 4/10 in severity. There was no associated loss of consciousness. The patient had no episodes of vomiting after the injury. The patient has no associated neck pain. The patient is on anticoagulants . is not sure what medication he is on. He is actually on it antiplatelet, Brilinta per chart. Came to the ED for further evaluation and treatment    REVIEW OF SYSTEMS    Neurologic: No extremity pain or weakness  Cardiac: No chest pain or chest injury  Respiratory: No difficulty breathing  GI: No abdominal pain or abdominal Injury  Skin: see HPI  Immunization: Tetanus status unknown, will be updated in the ED  All other systems reviewed and are negative.     PAST MEDICAL & SURGICAL HISTORY    Past Medical History:   Diagnosis Date    AAA (abdominal aortic aneurysm) 03/22/2021    Dr. Christen Stack    Bilateral hand pain     Chronic left-sided low back pain with left-sided sciatica     Chronic nausea     Chronic seasonal allergic rhinitis due to pollen     DDD (degenerative disc disease), cervical Essential hypertension     History of acute inferior wall MI 06/13/2022    Dr. Caio Gomez / Stent in RCA    Major depression single episode, in partial remission ([facility] [address]     Tobacco abuse      Past Surgical History:   Procedure Laterality Date    BONE MARROW [place]  1994    Donor    WISDOM TOOTH EXTRACTION         CURRENT MEDICATIONS    Current Outpatient Rx   Medication Sig Dispense Refill    metoprolol succinate (TOPROL XL) 25 MG extended release tablet Take 1 tablet by mouth daily 90 tablet 3    aspirin 81 MG chewable tablet Take 1 tablet by mouth daily 90 tablet 3    rosuvastatin (CRESTOR) 40 MG tablet Take 1 tablet by mouth nightly 90 tablet 3    ticagrelor (BRILINTA) 90 MG TABS tablet Take 1 tablet by mouth 2 times daily 180 tablet 3    famotidine (PEPCID) 20 MG tablet Take 1 tablet by mouth in the morning and 1 tablet before bedtime.  60 tablet 0    Nicotine 21-14-7 MG/24HR KIT Taper per package instruction (Patient not taking: Reported on 8/31/2022) 1 kit 0    nitroGLYCERIN (NITROSTAT) 0.4 MG SL tablet Place 1 tablet under the tongue every 5 minutes as needed for Chest pain 25 tablet 3       ALLERGIES    Allergies   Allergen Reactions    Codeine Nausea Only    Wellbutrin [Bupropion]      Insomnia        SOCIAL & FAMILY HISTORY    Social History     Socioeconomic History    Marital status:      Spouse name: None    Number of children: 2    Years of education: None    Highest education level: None   Occupational History    Occupation:     Tobacco Use    Smoking status: Every Day     Packs/day: 1.00     Years: 20.00     Pack years: 20.00     Types: Cigarettes    Smokeless tobacco: Never   Vaping Use    Vaping Use: Former   Substance and Sexual Activity    Alcohol use: Yes     Comment: Social     Drug use: No    Sexual activity: Not Currently     Partners: Female     Comment: wife     Social Determinants of Health     Financial Resource Strain: Low Risk     Difficulty of Paying Living Expenses: Not hard at all   Food Insecurity: No Food Insecurity    Worried About Running Out of Food in the Last Year: Never true    Ran Out of Food in the Last Year: Never true     Family History   Problem Relation Age of Onset    Heart Disease Mother         triple bypass    Heart Surgery Mother         triple by pass 12     Other Father         disappeared in 1970    Cancer Brother         Hodgkin's Lymphoma     High Blood Pressure Maternal Uncle        PHYSICAL EXAM    VITAL SIGNS: /78   Pulse 79   Temp 97 °F (36.1 °C) (Oral)   Resp 16   Ht 5' 6\" (1.676 m)   Wt 119 lb 14.9 oz (54.4 kg)   SpO2 100%   BMI 19.36 kg/m²   Constitutional:  Well developed, well nourished, no acute distress  Scalp: see integument, + right eyebrow underlying scalp hematoma  Neck: no posterior midline neck tenderness, no JVD   Eyes: Pupils equal, round and reactive to light, extraocular movement are intact  HENT:  No trismus, airway patent, no hemotympanum  Respiratory:  Lungs clear to auscultation bilaterally, no retractions   Cardiovascular:  Regular rate, no murmurs  GI:  Soft, nontender, normal bowel sounds  Musculoskeletal:  No edema, no acute deformities  Integument:  + 3 cm laceration localized over the right forehead. Is well approximated, is scabbing over and not bleeding. He has a another 1 cm crescent-shaped eyebrow laceration on the right side, the depth down to the subcutaneous tissue, there is no foreign body in the wound  Neurologic:  Awake, alert, oriented x4, no aphasia, no slurred speech, CN II-XII intact, normal finger to nose test bilaterally, 5/5 strength in all 4 extremities, sensation to light touch intact bilaterally, normal gait  Psych: Pleasant affect, no hallucinations    RADIOLOGY  CT HEAD WO CONTRAST   Final Result   No acute intracranial abnormality. PROCEDURE      Laceration Repair Procedure Note #1  Performed by: myself  Consent:  Verbal consent obtained by patient.  Risk of infection and pain discussed, as well as alternatives. Anesthesia:  none  Repair type:  simple  Pre-procedure:  Patient was prepped and draped in usual sterile fashion   Laceration details:    Location: right forehead  Length (cm):  3  Preparation:  Patient was prepped and draped in usual sterile fashion   Exploration: Hemostasis achieved with direct pressure, entire depth of wound probed and visualized    Contaminated: no    Treatment:   Amount of cleaning:  Extensive     Irrigation solution:  High pressure tap water  Visualized foreign bodies/material removed: no    Skin repair:   Repair method: Benzoin was placed on the skin, then Steri-Strips were applied. Skin glue was placed over the Steri-Strips. Approximation:  Close  Patient tolerance of procedure: Tolerated well, no immediate complications           Laceration Repair Procedure Note #2  Performed by: myself  Consent:  Verbal consent obtained by patient. Risk of infection and pain discussed, as well as alternatives. Anesthesia:  The patient was placed in the appropriate position and anesthesia obtained by local infiltration using 1% Lidocaine with epinephrine. Repair type:  simple     Pre-procedure:  Patient was prepped and draped in usual sterile fashion   Laceration details:    Location: right eyebrow  Length (cm):  1  Preparation:  Patient was prepped and draped in usual sterile fashion   Exploration: Hemostasis achieved with direct pressure, entire depth of wound probed and visualized in a bloodless field  Contaminated: no    Treatment:   Amount of cleaning:  Extensive     Irrigation solution:  High pressure tap water  Visualized foreign bodies/material removed: no    Skin repair:   Repair method:  Sutures  Suture size:  5-0  Suture material:  Nylon  Suture technique:  Simple interrupted  Approximation:  Close  Number of sutures: 3  Dressing:  Sterile dressing and antibiotic ointment  Patient tolerance of procedure:   Tolerated well, no immediate complications      ED COURSE & MEDICAL DECISION MAKING    See chart for details of any medications ordered    Differential diagnosis: Neurologic injury, vascular injury, involvement of bone that could lead to osteomyelitis, foreign body left in the wound, delayed bacterial skin infection, Epidural Hematoma, Subdural Hematoma, Parenchymal Brain contusion or bleed, Subarachnoid hemorrhage, Skull Fracture, Neck Fracture or Dislocation, other. Neuro exam unremarkable. Head CT is read by the radiologist as above. Lacerations repaired as above, tolerated well with no immediate complications noted. I utilized an evidence-based risk rating tool (CMT) along with my training and experience to weigh the risk of discharge against the risks of further testing, imaging, or hospitalization. At this time I estimate the risks of additional testing, imaging, or hospitalization to be equal to or greater than the risk of discharge. I discussed my risk assessment with the patient and the patient consents to the risk of discharge as well as the risk of uncertainty in estimating outcomes. At this time, the risk of intracranial findings warranting acute surgical intervention or causing rapidly progressive decline are so remote that the risk of imaging or testing is most likely greater than the risk of managing without imaging, or further imaging. No results found for this visit on 11/18/22. I estimate there is LOW risk for SUBARACHNOID HEMORRHAGE, MENINGITIS, INTRACRANIAL HEMORRHAGE, SUBDURAL HEMATOMA, OR STROKE, thus I consider the discharge disposition reasonable. Kayode Higuera and I have discussed the diagnosis and risks, and we agree with discharging home to follow-up with their primary doctor. We also discussed returning to the Emergency Department immediately if new or worsening symptoms occur.  We have discussed the symptoms which are most concerning (e.g., changing or worsening pain, weakness, vomiting, fever) that necessitate immediate return. Discharge Vital Signs:  Blood pressure 114/78, pulse 79, temperature 97 °F (36.1 °C), temperature source Oral, resp. rate 16, height 5' 6\" (1.676 m), weight 119 lb 14.9 oz (54.4 kg), SpO2 100 %. The patient was instructed to follow up as an outpatient in 1 day, and to return in 5-7 days for suture removal.  The patient was instructed to return to the ED immediately for any new or worsening symptoms. The patient verbalized understanding. FINAL IMPRESSION    1. Suture of skin wound    2. Facial laceration, initial encounter    3.  Injury of head, initial encounter        PLAN  Discharge with outpatient follow-up (see EMR)      (Please note that this note was completed with a voice recognition program.  Every attempt was made to edit the dictations, but inevitably there remain words that are mis-transcribed.)          YADI Vivar - BRADLEY  11/18/22 3366

## 2022-11-21 ENCOUNTER — HOSPITAL ENCOUNTER (OUTPATIENT)
Dept: CARDIAC REHAB | Age: 62
Setting detail: THERAPIES SERIES
Discharge: HOME | End: 2022-11-21
Payer: MEDICARE

## 2022-11-21 PROCEDURE — 93798 PHYS/QHP OP CAR RHAB W/ECG: CPT

## 2022-11-23 ENCOUNTER — HOSPITAL ENCOUNTER (OUTPATIENT)
Dept: CARDIAC REHAB | Age: 62
Setting detail: THERAPIES SERIES
Discharge: HOME | End: 2022-11-23
Payer: MEDICARE

## 2022-11-23 ENCOUNTER — OFFICE VISIT (OUTPATIENT)
Dept: FAMILY MEDICINE CLINIC | Age: 62
End: 2022-11-23
Payer: MEDICARE

## 2022-11-23 VITALS — SYSTOLIC BLOOD PRESSURE: 96 MMHG | DIASTOLIC BLOOD PRESSURE: 70 MMHG | WEIGHT: 125.6 LBS | BODY MASS INDEX: 20.27 KG/M2

## 2022-11-23 DIAGNOSIS — Z23 NEED FOR INFLUENZA VACCINATION: ICD-10-CM

## 2022-11-23 DIAGNOSIS — S01.81XA FACIAL LACERATION, INITIAL ENCOUNTER: Primary | ICD-10-CM

## 2022-11-23 PROCEDURE — G8484 FLU IMMUNIZE NO ADMIN: HCPCS | Performed by: FAMILY MEDICINE

## 2022-11-23 PROCEDURE — 4004F PT TOBACCO SCREEN RCVD TLK: CPT | Performed by: FAMILY MEDICINE

## 2022-11-23 PROCEDURE — S0630 REMOVAL OF SUTURES: HCPCS | Performed by: FAMILY MEDICINE

## 2022-11-23 PROCEDURE — 93798 PHYS/QHP OP CAR RHAB W/ECG: CPT

## 2022-11-23 PROCEDURE — G8427 DOCREV CUR MEDS BY ELIG CLIN: HCPCS | Performed by: FAMILY MEDICINE

## 2022-11-23 PROCEDURE — 3074F SYST BP LT 130 MM HG: CPT | Performed by: FAMILY MEDICINE

## 2022-11-23 PROCEDURE — 99213 OFFICE O/P EST LOW 20 MIN: CPT | Performed by: FAMILY MEDICINE

## 2022-11-23 PROCEDURE — G8420 CALC BMI NORM PARAMETERS: HCPCS | Performed by: FAMILY MEDICINE

## 2022-11-23 PROCEDURE — 3017F COLORECTAL CA SCREEN DOC REV: CPT | Performed by: FAMILY MEDICINE

## 2022-11-23 PROCEDURE — 3078F DIAST BP <80 MM HG: CPT | Performed by: FAMILY MEDICINE

## 2022-11-23 NOTE — PROGRESS NOTES
Subjective:      Patient ID: Haley Brown is a 57 y.o. male. HPI    Suture Removal:  Patient presented to ER on 11-18-22 with a 3 cm laceration above the right eye from a doorframe. It was repaired with 3 simple sutures. He was advised to have them removed in 5 days. Review of Systems   Constitutional:  Negative for chills and fever. Skin:  Positive for wound. BP 96/70   Wt 125 lb 9.6 oz (57 kg)   BMI 20.27 kg/m²    Objective:   Physical Exam  Constitutional:       Appearance: Normal appearance. Neurological:      Mental Status: He is alert and oriented to person, place, and time. Assessment:      Facial Laceration Suture Removal       Plan:      Sutures removed x 3 with sterile technique  Flu Shot Declined    Recommended that patient have an AWV.    RTO as needed         [address],

## 2022-11-28 ENCOUNTER — HOSPITAL ENCOUNTER (OUTPATIENT)
Dept: CARDIAC REHAB | Age: 62
Setting detail: THERAPIES SERIES
Discharge: HOME | End: 2022-11-28
Payer: MEDICARE

## 2022-11-28 PROCEDURE — 93798 PHYS/QHP OP CAR RHAB W/ECG: CPT

## 2022-11-30 ENCOUNTER — HOSPITAL ENCOUNTER (OUTPATIENT)
Dept: CARDIAC REHAB | Age: 62
Setting detail: THERAPIES SERIES
Discharge: HOME | End: 2022-11-30
Payer: MEDICARE

## 2022-11-30 PROCEDURE — 93798 PHYS/QHP OP CAR RHAB W/ECG: CPT

## 2022-12-02 ENCOUNTER — HOSPITAL ENCOUNTER (OUTPATIENT)
Dept: CARDIAC REHAB | Age: 62
Setting detail: THERAPIES SERIES
Discharge: HOME | End: 2022-12-02
Payer: MEDICARE

## 2022-12-02 PROCEDURE — 93798 PHYS/QHP OP CAR RHAB W/ECG: CPT

## 2022-12-05 ENCOUNTER — HOSPITAL ENCOUNTER (OUTPATIENT)
Dept: CARDIAC REHAB | Age: 62
Setting detail: THERAPIES SERIES
Discharge: HOME | End: 2022-12-05
Payer: MEDICARE

## 2022-12-05 PROCEDURE — 93798 PHYS/QHP OP CAR RHAB W/ECG: CPT

## 2022-12-07 ENCOUNTER — HOSPITAL ENCOUNTER (OUTPATIENT)
Dept: CARDIAC REHAB | Age: 62
Setting detail: THERAPIES SERIES
Discharge: HOME | End: 2022-12-07
Payer: MEDICARE

## 2022-12-07 PROCEDURE — 93798 PHYS/QHP OP CAR RHAB W/ECG: CPT

## 2022-12-09 ENCOUNTER — HOSPITAL ENCOUNTER (OUTPATIENT)
Dept: CARDIAC REHAB | Age: 62
Setting detail: THERAPIES SERIES
Discharge: HOME | End: 2022-12-09
Payer: MEDICARE

## 2022-12-09 PROCEDURE — 93798 PHYS/QHP OP CAR RHAB W/ECG: CPT

## 2023-01-22 ENCOUNTER — APPOINTMENT (OUTPATIENT)
Dept: GENERAL RADIOLOGY | Age: 63
End: 2023-01-22
Payer: MEDICARE

## 2023-01-22 ENCOUNTER — HOSPITAL ENCOUNTER (OUTPATIENT)
Age: 63
Setting detail: OBSERVATION
Discharge: HOME | End: 2023-01-24
Attending: EMERGENCY MEDICINE | Admitting: INTERNAL MEDICINE
Payer: MEDICARE

## 2023-01-22 DIAGNOSIS — R07.9 CHEST PAIN, UNSPECIFIED TYPE: Primary | ICD-10-CM

## 2023-01-22 LAB
ANION GAP SERPL CALCULATED.3IONS-SCNC: 11 MMOL/L (ref 3–16)
BASOPHILS ABSOLUTE: 0.1 K/UL (ref 0–0.2)
BASOPHILS RELATIVE PERCENT: 1.2 %
BUN BLDV-MCNC: 13 MG/DL (ref 7–20)
CALCIUM SERPL-MCNC: 9.3 MG/DL (ref 8.3–10.6)
CHLORIDE BLD-SCNC: 102 MMOL/L (ref 99–110)
CO2: 23 MMOL/L (ref 21–32)
CREAT SERPL-MCNC: 1.2 MG/DL (ref 0.8–1.3)
EOSINOPHILS ABSOLUTE: 0.2 K/UL (ref 0–0.6)
EOSINOPHILS RELATIVE PERCENT: 2.2 %
GFR SERPL CREATININE-BSD FRML MDRD: >60 ML/MIN/{1.73_M2}
GLUCOSE BLD-MCNC: 94 MG/DL (ref 70–99)
HCT VFR BLD CALC: 40.7 % (ref 40.5–52.5)
HEMOGLOBIN: 13.6 G/DL (ref 13.5–17.5)
LYMPHOCYTES ABSOLUTE: 1.6 K/UL (ref 1–5.1)
LYMPHOCYTES RELATIVE PERCENT: 23.1 %
MAGNESIUM: 2.3 MG/DL (ref 1.8–2.4)
MCH RBC QN AUTO: 31.5 PG (ref 26–34)
MCHC RBC AUTO-ENTMCNC: 33.4 G/DL (ref 31–36)
MCV RBC AUTO: 94.1 FL (ref 80–100)
MONOCYTES ABSOLUTE: 0.7 K/UL (ref 0–1.3)
MONOCYTES RELATIVE PERCENT: 10.4 %
NEUTROPHILS ABSOLUTE: 4.5 K/UL (ref 1.7–7.7)
NEUTROPHILS RELATIVE PERCENT: 63.1 %
PDW BLD-RTO: 13.5 % (ref 12.4–15.4)
PLATELET # BLD: 185 K/UL (ref 135–450)
PMV BLD AUTO: 8.2 FL (ref 5–10.5)
POTASSIUM REFLEX MAGNESIUM: 3.5 MMOL/L (ref 3.5–5.1)
RBC # BLD: 4.33 M/UL (ref 4.2–5.9)
SODIUM BLD-SCNC: 136 MMOL/L (ref 136–145)
TROPONIN: <0.01 NG/ML
WBC # BLD: 7.1 K/UL (ref 4–11)

## 2023-01-22 PROCEDURE — 71046 X-RAY EXAM CHEST 2 VIEWS: CPT

## 2023-01-22 PROCEDURE — 93005 ELECTROCARDIOGRAM TRACING: CPT

## 2023-01-22 PROCEDURE — 84484 ASSAY OF TROPONIN QUANT: CPT

## 2023-01-22 PROCEDURE — 96374 THER/PROPH/DIAG INJ IV PUSH: CPT

## 2023-01-22 PROCEDURE — 96375 TX/PRO/DX INJ NEW DRUG ADDON: CPT

## 2023-01-22 PROCEDURE — 99285 EMERGENCY DEPT VISIT HI MDM: CPT

## 2023-01-22 PROCEDURE — G0378 HOSPITAL OBSERVATION PER HR: HCPCS

## 2023-01-22 PROCEDURE — 6360000002 HC RX W HCPCS: Performed by: EMERGENCY MEDICINE

## 2023-01-22 PROCEDURE — 85025 COMPLETE CBC W/AUTO DIFF WBC: CPT

## 2023-01-22 PROCEDURE — 93005 ELECTROCARDIOGRAM TRACING: CPT | Performed by: EMERGENCY MEDICINE

## 2023-01-22 PROCEDURE — 80048 BASIC METABOLIC PNL TOTAL CA: CPT

## 2023-01-22 PROCEDURE — 83735 ASSAY OF MAGNESIUM: CPT

## 2023-01-22 RX ORDER — FENTANYL CITRATE 50 UG/ML
50 INJECTION, SOLUTION INTRAMUSCULAR; INTRAVENOUS ONCE
Status: COMPLETED | OUTPATIENT
Start: 2023-01-22 | End: 2023-01-22

## 2023-01-22 RX ORDER — FAMOTIDINE 20 MG/1
20 TABLET, FILM COATED ORAL 2 TIMES DAILY
Status: DISCONTINUED | OUTPATIENT
Start: 2023-01-22 | End: 2023-01-23

## 2023-01-22 RX ORDER — MAGNESIUM SULFATE IN WATER 40 MG/ML
2000 INJECTION, SOLUTION INTRAVENOUS PRN
Status: DISCONTINUED | OUTPATIENT
Start: 2023-01-22 | End: 2023-01-24 | Stop reason: HOSPADM

## 2023-01-22 RX ORDER — MORPHINE SULFATE 2 MG/ML
2 INJECTION, SOLUTION INTRAMUSCULAR; INTRAVENOUS
Status: DISCONTINUED | OUTPATIENT
Start: 2023-01-22 | End: 2023-01-24 | Stop reason: HOSPADM

## 2023-01-22 RX ORDER — METOPROLOL SUCCINATE 25 MG/1
25 TABLET, EXTENDED RELEASE ORAL DAILY
Status: DISCONTINUED | OUTPATIENT
Start: 2023-01-23 | End: 2023-01-24 | Stop reason: HOSPADM

## 2023-01-22 RX ORDER — POTASSIUM CHLORIDE 7.45 MG/ML
10 INJECTION INTRAVENOUS PRN
Status: DISCONTINUED | OUTPATIENT
Start: 2023-01-22 | End: 2023-01-24 | Stop reason: HOSPADM

## 2023-01-22 RX ORDER — SODIUM CHLORIDE 0.9 % (FLUSH) 0.9 %
10 SYRINGE (ML) INJECTION EVERY 12 HOURS SCHEDULED
Status: DISCONTINUED | OUTPATIENT
Start: 2023-01-22 | End: 2023-01-24 | Stop reason: HOSPADM

## 2023-01-22 RX ORDER — NICOTINE 21 MG/24HR
1 PATCH, TRANSDERMAL 24 HOURS TRANSDERMAL NIGHTLY
Status: DISCONTINUED | OUTPATIENT
Start: 2023-01-23 | End: 2023-01-24 | Stop reason: HOSPADM

## 2023-01-22 RX ORDER — NITROGLYCERIN 0.4 MG/1
0.4 TABLET SUBLINGUAL EVERY 5 MIN PRN
Status: DISCONTINUED | OUTPATIENT
Start: 2023-01-22 | End: 2023-01-24 | Stop reason: HOSPADM

## 2023-01-22 RX ORDER — ACETAMINOPHEN 325 MG/1
650 TABLET ORAL EVERY 6 HOURS PRN
Status: DISCONTINUED | OUTPATIENT
Start: 2023-01-22 | End: 2023-01-24 | Stop reason: HOSPADM

## 2023-01-22 RX ORDER — ROSUVASTATIN CALCIUM 40 MG/1
40 TABLET, COATED ORAL NIGHTLY
Status: DISCONTINUED | OUTPATIENT
Start: 2023-01-23 | End: 2023-01-24 | Stop reason: HOSPADM

## 2023-01-22 RX ORDER — MORPHINE SULFATE 2 MG/ML
1 INJECTION, SOLUTION INTRAMUSCULAR; INTRAVENOUS
Status: DISCONTINUED | OUTPATIENT
Start: 2023-01-22 | End: 2023-01-24 | Stop reason: HOSPADM

## 2023-01-22 RX ORDER — ACETAMINOPHEN 650 MG/1
650 SUPPOSITORY RECTAL EVERY 6 HOURS PRN
Status: DISCONTINUED | OUTPATIENT
Start: 2023-01-22 | End: 2023-01-24 | Stop reason: HOSPADM

## 2023-01-22 RX ORDER — ONDANSETRON 2 MG/ML
4 INJECTION INTRAMUSCULAR; INTRAVENOUS ONCE
Status: DISCONTINUED | OUTPATIENT
Start: 2023-01-22 | End: 2023-01-24 | Stop reason: HOSPADM

## 2023-01-22 RX ORDER — SODIUM CHLORIDE 9 MG/ML
INJECTION, SOLUTION INTRAVENOUS PRN
Status: DISCONTINUED | OUTPATIENT
Start: 2023-01-22 | End: 2023-01-24 | Stop reason: HOSPADM

## 2023-01-22 RX ORDER — MORPHINE SULFATE 4 MG/ML
4 INJECTION, SOLUTION INTRAMUSCULAR; INTRAVENOUS ONCE
Status: COMPLETED | OUTPATIENT
Start: 2023-01-22 | End: 2023-01-22

## 2023-01-22 RX ORDER — ENOXAPARIN SODIUM 100 MG/ML
40 INJECTION SUBCUTANEOUS DAILY
Status: DISCONTINUED | OUTPATIENT
Start: 2023-01-23 | End: 2023-01-24 | Stop reason: HOSPADM

## 2023-01-22 RX ORDER — ONDANSETRON 2 MG/ML
4 INJECTION INTRAMUSCULAR; INTRAVENOUS EVERY 6 HOURS PRN
Status: DISCONTINUED | OUTPATIENT
Start: 2023-01-22 | End: 2023-01-24 | Stop reason: HOSPADM

## 2023-01-22 RX ORDER — SODIUM CHLORIDE 0.9 % (FLUSH) 0.9 %
10 SYRINGE (ML) INJECTION PRN
Status: DISCONTINUED | OUTPATIENT
Start: 2023-01-22 | End: 2023-01-24 | Stop reason: HOSPADM

## 2023-01-22 RX ORDER — PROMETHAZINE HYDROCHLORIDE 25 MG/1
12.5 TABLET ORAL EVERY 6 HOURS PRN
Status: DISCONTINUED | OUTPATIENT
Start: 2023-01-22 | End: 2023-01-24 | Stop reason: HOSPADM

## 2023-01-22 RX ORDER — ASPIRIN 81 MG/1
81 TABLET, CHEWABLE ORAL DAILY
Status: DISCONTINUED | OUTPATIENT
Start: 2023-01-23 | End: 2023-01-24 | Stop reason: HOSPADM

## 2023-01-22 RX ADMIN — MORPHINE SULFATE 4 MG: 4 INJECTION, SOLUTION INTRAMUSCULAR; INTRAVENOUS at 22:15

## 2023-01-22 RX ADMIN — FENTANYL CITRATE 50 MCG: 50 INJECTION, SOLUTION INTRAMUSCULAR; INTRAVENOUS at 21:02

## 2023-01-22 ASSESSMENT — PAIN SCALES - GENERAL
PAINLEVEL_OUTOF10: 4
PAINLEVEL_OUTOF10: 4
PAINLEVEL_OUTOF10: 6
PAINLEVEL_OUTOF10: 7

## 2023-01-22 ASSESSMENT — PAIN - FUNCTIONAL ASSESSMENT
PAIN_FUNCTIONAL_ASSESSMENT: PREVENTS OR INTERFERES SOME ACTIVE ACTIVITIES AND ADLS
PAIN_FUNCTIONAL_ASSESSMENT: ACTIVITIES ARE NOT PREVENTED
PAIN_FUNCTIONAL_ASSESSMENT: 0-10

## 2023-01-22 ASSESSMENT — PAIN DESCRIPTION - ONSET: ONSET: ON-GOING

## 2023-01-22 ASSESSMENT — PAIN DESCRIPTION - DESCRIPTORS
DESCRIPTORS: PRESSURE
DESCRIPTORS: STABBING

## 2023-01-22 ASSESSMENT — PAIN DESCRIPTION - LOCATION
LOCATION: CHEST
LOCATION: CHEST

## 2023-01-22 ASSESSMENT — PAIN DESCRIPTION - FREQUENCY: FREQUENCY: CONTINUOUS

## 2023-01-22 ASSESSMENT — PAIN DESCRIPTION - ORIENTATION
ORIENTATION: LEFT
ORIENTATION: LEFT

## 2023-01-22 ASSESSMENT — PAIN DESCRIPTION - PAIN TYPE
TYPE: ACUTE PAIN
TYPE: ACUTE PAIN

## 2023-01-23 ENCOUNTER — TELEPHONE (OUTPATIENT)
Dept: CARDIOLOGY CLINIC | Age: 63
End: 2023-01-23

## 2023-01-23 DIAGNOSIS — I25.119 CORONARY ARTERY DISEASE INVOLVING NATIVE CORONARY ARTERY OF NATIVE HEART WITH ANGINA PECTORIS (HCC): Primary | ICD-10-CM

## 2023-01-23 DIAGNOSIS — R07.9 CHEST PAIN, UNSPECIFIED TYPE: ICD-10-CM

## 2023-01-23 LAB
A/G RATIO: 1.8 (ref 1.1–2.2)
ALBUMIN SERPL-MCNC: 3.8 G/DL (ref 3.4–5)
ALP BLD-CCNC: 65 U/L (ref 40–129)
ALT SERPL-CCNC: 46 U/L (ref 10–40)
ANION GAP SERPL CALCULATED.3IONS-SCNC: 9 MMOL/L (ref 3–16)
AST SERPL-CCNC: 44 U/L (ref 15–37)
BASOPHILS ABSOLUTE: 0.1 K/UL (ref 0–0.2)
BASOPHILS RELATIVE PERCENT: 1.6 %
BILIRUB SERPL-MCNC: 0.3 MG/DL (ref 0–1)
BUN BLDV-MCNC: 11 MG/DL (ref 7–20)
CALCIUM SERPL-MCNC: 8.5 MG/DL (ref 8.3–10.6)
CHLORIDE BLD-SCNC: 104 MMOL/L (ref 99–110)
CO2: 23 MMOL/L (ref 21–32)
CREAT SERPL-MCNC: 1.1 MG/DL (ref 0.8–1.3)
EKG ATRIAL RATE: 65 BPM
EKG ATRIAL RATE: 70 BPM
EKG ATRIAL RATE: 81 BPM
EKG DIAGNOSIS: NORMAL
EKG P AXIS: 63 DEGREES
EKG P AXIS: 73 DEGREES
EKG P AXIS: 84 DEGREES
EKG P-R INTERVAL: 156 MS
EKG P-R INTERVAL: 158 MS
EKG P-R INTERVAL: 162 MS
EKG Q-T INTERVAL: 402 MS
EKG Q-T INTERVAL: 408 MS
EKG Q-T INTERVAL: 414 MS
EKG QRS DURATION: 68 MS
EKG QRS DURATION: 70 MS
EKG QRS DURATION: 70 MS
EKG QTC CALCULATION (BAZETT): 430 MS
EKG QTC CALCULATION (BAZETT): 434 MS
EKG QTC CALCULATION (BAZETT): 473 MS
EKG R AXIS: 47 DEGREES
EKG R AXIS: 63 DEGREES
EKG R AXIS: 66 DEGREES
EKG T AXIS: 31 DEGREES
EKG T AXIS: 33 DEGREES
EKG T AXIS: 34 DEGREES
EKG VENTRICULAR RATE: 65 BPM
EKG VENTRICULAR RATE: 70 BPM
EKG VENTRICULAR RATE: 81 BPM
EOSINOPHILS ABSOLUTE: 0.2 K/UL (ref 0–0.6)
EOSINOPHILS RELATIVE PERCENT: 3.6 %
GFR SERPL CREATININE-BSD FRML MDRD: >60 ML/MIN/{1.73_M2}
GLUCOSE BLD-MCNC: 100 MG/DL (ref 70–99)
HCT VFR BLD CALC: 36.7 % (ref 40.5–52.5)
HEMOGLOBIN: 12.8 G/DL (ref 13.5–17.5)
LV EF: 69 %
LVEF MODALITY: NORMAL
LYMPHOCYTES ABSOLUTE: 1.6 K/UL (ref 1–5.1)
LYMPHOCYTES RELATIVE PERCENT: 26.2 %
MCH RBC QN AUTO: 32.7 PG (ref 26–34)
MCHC RBC AUTO-ENTMCNC: 34.7 G/DL (ref 31–36)
MCV RBC AUTO: 94 FL (ref 80–100)
MONOCYTES ABSOLUTE: 0.6 K/UL (ref 0–1.3)
MONOCYTES RELATIVE PERCENT: 9.7 %
NEUTROPHILS ABSOLUTE: 3.6 K/UL (ref 1.7–7.7)
NEUTROPHILS RELATIVE PERCENT: 58.9 %
PDW BLD-RTO: 13.5 % (ref 12.4–15.4)
PLATELET # BLD: 156 K/UL (ref 135–450)
PMV BLD AUTO: 8.4 FL (ref 5–10.5)
POTASSIUM REFLEX MAGNESIUM: 3.8 MMOL/L (ref 3.5–5.1)
RBC # BLD: 3.91 M/UL (ref 4.2–5.9)
SODIUM BLD-SCNC: 136 MMOL/L (ref 136–145)
TOTAL PROTEIN: 5.9 G/DL (ref 6.4–8.2)
TROPONIN: <0.01 NG/ML
TROPONIN: <0.01 NG/ML
WBC # BLD: 6.2 K/UL (ref 4–11)

## 2023-01-23 PROCEDURE — 78452 HT MUSCLE IMAGE SPECT MULT: CPT

## 2023-01-23 PROCEDURE — G0378 HOSPITAL OBSERVATION PER HR: HCPCS

## 2023-01-23 PROCEDURE — 6370000000 HC RX 637 (ALT 250 FOR IP): Performed by: INTERNAL MEDICINE

## 2023-01-23 PROCEDURE — 36415 COLL VENOUS BLD VENIPUNCTURE: CPT

## 2023-01-23 PROCEDURE — 2580000003 HC RX 258: Performed by: INTERNAL MEDICINE

## 2023-01-23 PROCEDURE — 96372 THER/PROPH/DIAG INJ SC/IM: CPT

## 2023-01-23 PROCEDURE — 6360000002 HC RX W HCPCS: Performed by: INTERNAL MEDICINE

## 2023-01-23 PROCEDURE — A9502 TC99M TETROFOSMIN: HCPCS

## 2023-01-23 PROCEDURE — 85025 COMPLETE CBC W/AUTO DIFF WBC: CPT

## 2023-01-23 PROCEDURE — 93010 ELECTROCARDIOGRAM REPORT: CPT | Performed by: INTERNAL MEDICINE

## 2023-01-23 PROCEDURE — 3430000000 HC RX DIAGNOSTIC RADIOPHARMACEUTICAL

## 2023-01-23 PROCEDURE — 99222 1ST HOSP IP/OBS MODERATE 55: CPT | Performed by: STUDENT IN AN ORGANIZED HEALTH CARE EDUCATION/TRAINING PROGRAM

## 2023-01-23 PROCEDURE — 96376 TX/PRO/DX INJ SAME DRUG ADON: CPT

## 2023-01-23 PROCEDURE — A9502 TC99M TETROFOSMIN: HCPCS | Performed by: INTERNAL MEDICINE

## 2023-01-23 PROCEDURE — 84484 ASSAY OF TROPONIN QUANT: CPT

## 2023-01-23 PROCEDURE — 3430000000 HC RX DIAGNOSTIC RADIOPHARMACEUTICAL: Performed by: INTERNAL MEDICINE

## 2023-01-23 PROCEDURE — 80053 COMPREHEN METABOLIC PANEL: CPT

## 2023-01-23 PROCEDURE — 93017 CV STRESS TEST TRACING ONLY: CPT

## 2023-01-23 RX ADMIN — ROSUVASTATIN CALCIUM 40 MG: 40 TABLET, FILM COATED ORAL at 20:58

## 2023-01-23 RX ADMIN — TETROFOSMIN 10 MILLICURIE: 1.38 INJECTION, POWDER, LYOPHILIZED, FOR SOLUTION INTRAVENOUS at 07:14

## 2023-01-23 RX ADMIN — Medication 10 ML: at 00:39

## 2023-01-23 RX ADMIN — TICAGRELOR 90 MG: 90 TABLET ORAL at 09:34

## 2023-01-23 RX ADMIN — TICAGRELOR 90 MG: 90 TABLET ORAL at 20:58

## 2023-01-23 RX ADMIN — Medication 10 ML: at 09:36

## 2023-01-23 RX ADMIN — TETROFOSMIN 30 MILLICURIE: 1.38 INJECTION, POWDER, LYOPHILIZED, FOR SOLUTION INTRAVENOUS at 08:32

## 2023-01-23 RX ADMIN — METOPROLOL SUCCINATE 25 MG: 25 TABLET, EXTENDED RELEASE ORAL at 09:34

## 2023-01-23 RX ADMIN — ASPIRIN 81 MG CHEWABLE TABLET 81 MG: 81 TABLET CHEWABLE at 09:34

## 2023-01-23 RX ADMIN — ENOXAPARIN SODIUM 40 MG: 100 INJECTION SUBCUTANEOUS at 09:34

## 2023-01-23 RX ADMIN — MORPHINE SULFATE 1 MG: 2 INJECTION, SOLUTION INTRAMUSCULAR; INTRAVENOUS at 00:39

## 2023-01-23 RX ADMIN — MORPHINE SULFATE 1 MG: 2 INJECTION, SOLUTION INTRAMUSCULAR; INTRAVENOUS at 13:39

## 2023-01-23 RX ADMIN — REGADENOSON 0.4 MG: 0.08 INJECTION, SOLUTION INTRAVENOUS at 08:22

## 2023-01-23 RX ADMIN — MORPHINE SULFATE 2 MG: 2 INJECTION, SOLUTION INTRAMUSCULAR; INTRAVENOUS at 04:36

## 2023-01-23 RX ADMIN — Medication 10 ML: at 20:59

## 2023-01-23 RX ADMIN — MORPHINE SULFATE 1 MG: 2 INJECTION, SOLUTION INTRAMUSCULAR; INTRAVENOUS at 19:00

## 2023-01-23 ASSESSMENT — PAIN DESCRIPTION - LOCATION
LOCATION: CHEST
LOCATION: BACK;CHEST
LOCATION: CHEST
LOCATION: BACK
LOCATION: BACK;CHEST

## 2023-01-23 ASSESSMENT — PAIN DESCRIPTION - DESCRIPTORS
DESCRIPTORS: STABBING
DESCRIPTORS: SHARP
DESCRIPTORS: ACHING
DESCRIPTORS: STABBING

## 2023-01-23 ASSESSMENT — PAIN SCALES - GENERAL
PAINLEVEL_OUTOF10: 4
PAINLEVEL_OUTOF10: 4
PAINLEVEL_OUTOF10: 0
PAINLEVEL_OUTOF10: 4
PAINLEVEL_OUTOF10: 4
PAINLEVEL_OUTOF10: 6
PAINLEVEL_OUTOF10: 7
PAINLEVEL_OUTOF10: 6
PAINLEVEL_OUTOF10: 0
PAINLEVEL_OUTOF10: 4

## 2023-01-23 ASSESSMENT — PAIN - FUNCTIONAL ASSESSMENT
PAIN_FUNCTIONAL_ASSESSMENT: ACTIVITIES ARE NOT PREVENTED

## 2023-01-23 ASSESSMENT — PAIN DESCRIPTION - ORIENTATION
ORIENTATION: LEFT;MID
ORIENTATION: LEFT

## 2023-01-23 ASSESSMENT — PAIN DESCRIPTION - FREQUENCY: FREQUENCY: CONTINUOUS

## 2023-01-23 ASSESSMENT — PAIN DESCRIPTION - ONSET: ONSET: ON-GOING

## 2023-01-23 ASSESSMENT — PAIN DESCRIPTION - PAIN TYPE: TYPE: ACUTE PAIN

## 2023-01-23 NOTE — PROGRESS NOTES
Pt states that the cardiologist said he could be discharged and follow up for a heart cath as outpt. Dr Marcos Villa made aware.  Electronically signed by Rosi Nicholson RN on 1/23/2023 at 6:28 PM

## 2023-01-23 NOTE — PROGRESS NOTES
Pt AO, VSS, c/o left sided stabbing chest pain 4/10. Denies any additional symptoms. Morphine unavailable until 0015. Declines tylenol. Denies other needs at this time. Pt oriented to use of call light. Call light in reach.

## 2023-01-23 NOTE — PLAN OF CARE
Problem: Pain  Goal: Verbalizes/displays adequate comfort level or baseline comfort level  Outcome: Progressing     Problem: Chronic Conditions and Co-morbidities  Goal: Patient's chronic conditions and co-morbidity symptoms are monitored and maintained or improved  Outcome: Progressing   Care plan initiated

## 2023-01-23 NOTE — CONSULTS
[facility].        Chief Complaint   Patient presents with    Chest Pain     Started 2 hours ago with chest pain. Pt took 2 nitro at home and 1 81mg asa and squad gave 3 more asa and another nitro and pain is now 4/10            History of Present Illness:  Scott Ortiz is a 57 y.o. patient who presented to the hospital with complaints of Chest pain. I have been asked to provide consultation regarding further management and testing. Is a 64-year-old gentleman with a past medical history of known coronary disease status post PCI, as well as history of AAA, tobacco abuse, and hypertension. Presented to the hospital with him plaints of chest pain, states it feels very similar to his previous from his MI however not as severe. Patient had a Lexiscan MPI performed today, which did not have any significant perfusion defects. During the test however he reports to me immediately with regadenoson injection and hyperemia that he instantly felt nauseous, diaphoretic, with severe substernal chest pain endorsed to me that he actually did feel like using a diaphragm the amount of chest pain from the [place]. Past Medical History:   has a past medical history of AAA (abdominal aortic aneurysm), Bilateral hand pain, Chronic left-sided low back pain with left-sided sciatica, Chronic nausea, Chronic seasonal allergic rhinitis due to pollen, DDD (degenerative disc disease), cervical, Essential hypertension, History of acute inferior wall MI, Major depression single episode, in partial remission ([address] and Tobacco abuse. Surgical History:   has a past surgical history that includes Bone Marrow [place] (1994) and [place] tooth extraction. Social History:   reports that he has been smoking cigarettes. He has a 20.00 pack-year smoking history. He has never used smokeless tobacco. He reports current alcohol use. He reports that he does not use drugs.      Family History:  No family history of premature coronary artery disease, aortic disease, or valve disease.    Home Medications:  Were reviewed and are listed in nursing record. and/or listed below  Prior to Admission medications    Medication Sig Start Date End Date Taking? Authorizing Provider   metoprolol succinate (TOPROL XL) 25 MG extended release tablet Take 1 tablet by mouth daily 9/9/22   Immanuel Chadwick MD   aspirin 81 MG chewable tablet Take 1 tablet by mouth daily 9/9/22   Immanuel Chadwick MD   rosuvastatin (CRESTOR) 40 MG tablet Take 1 tablet by mouth nightly 9/9/22   Immanuel Chadwick MD   ticagrelor (BRILINTA) 90 MG TABS tablet Take 1 tablet by mouth 2 times daily 9/9/22   Immanuel Chadwick MD   nitroGLYCERIN (NITROSTAT) 0.4 MG SL tablet Place 1 tablet under the tongue every 5 minutes as needed for Chest pain 6/22/22   Joanna Mcnamara, APRN - CNP        Current Medications:  Current Facility-Administered Medications   Medication Dose Route Frequency Provider Last Rate Last Admin    nitroglycerin (NITRO-BID) 2 % ointment 0.5 inch  0.5 inch Topical Once Immanuel Barba MD        ondansetron (ZOFRAN) injection 4 mg  4 mg IntraVENous Once Immanuel Barba MD        sodium chloride flush 0.9 % injection 10 mL  10 mL IntraVENous 2 times per day Ahmad TAMI Rio, DO   10 mL at 01/23/23 0936    sodium chloride flush 0.9 % injection 10 mL  10 mL IntraVENous PRN Thomasd TAMI Smithzi, DO        0.9 % sodium chloride infusion   IntraVENous PRN Thomasd TAMI RobertsonRio, DO        potassium chloride 10 mEq/100 mL IVPB (Peripheral Line)  10 mEq IntraVENous PRN Thomasd TAMI RobertsonRio, DO        magnesium sulfate 2000 mg in 50 mL IVPB premix  2,000 mg IntraVENous PRN Ahmad TAMI RobertsonRio, DO        enoxaparin (LOVENOX) injection 40 mg  40 mg SubCUTAneous Daily Ahmad TAMI Smithzi, DO   40 mg at 01/23/23 0934    promethazine (PHENERGAN) tablet 12.5 mg  12.5 mg Oral Q6H PRN Thomasd TAMI Pate, DO        Or    ondansetron (ZOFRAN) injection 4 mg  4 mg IntraVENous  Q6H PRN Alexy Cluck Rio, DO        acetaminophen (TYLENOL) tablet 650 mg  650 mg Oral Q6H PRN Thomasd TAMI Rio, DO        Or    acetaminophen (TYLENOL) suppository 650 mg  650 mg Rectal Q6H PRN Lindsay Lundborg A Rio, DO        aspirin chewable tablet 81 mg  81 mg Oral Daily Ahmad TAMI RobertsonRio, DO   81 mg at 01/23/23 0934    metoprolol succinate (TOPROL XL) extended release tablet 25 mg  25 mg Oral Daily Ahmad TAMI RobertsonRio, DO   25 mg at 01/23/23 0934    rosuvastatin (CRESTOR) tablet 40 mg  40 mg Oral [facility]        ticagrelor (BRILINTA) tablet 90 mg  90 mg Oral BID mad TAMI RobertsonRio, DO   90 mg at 01/23/23 0934    nitroGLYCERIN (NITROSTAT) SL tablet 0.4 mg  0.4 mg SubLINGual Q5 Min PRN Priscilalydia TAMI Rio, DO        nicotine (NICODERM CQ) 21 MG/24HR 1 patch  1 patch TransDERmal Nightly [facility], DO        morphine (PF) injection 1 mg  1 mg IntraVENous Q2H PRN [facility], [facility]   1 mg at 01/23/23 1339    Or    morphine (PF) injection 2 mg  2 mg IntraVENous Q2H PRN [facility] Pate, DO   2 mg at 01/23/23 0436        Allergies:  Codeine and Wellbutrin [bupropion]     Review of Systems:   Positive for chest pain  All other systems reviewed and negative    Physical Examination:    Vitals:    01/23/23 1504   BP: 119/66   Pulse: 65   Resp: 16   Temp: 98.4 °F (36.9 °C)   SpO2: 97%      Weight: 118 lb 9.7 oz (53.8 kg)       Physical Exam  Vitals and nursing note reviewed. Constitutional:       Appearance: Normal appearance. He is not ill-appearing or diaphoretic. HENT:      Head: Normocephalic and atraumatic. Mouth/Throat:      Mouth: Mucous membranes are moist.   Eyes:      Pupils: Pupils are equal, round, and reactive to light. Cardiovascular:      Rate and Rhythm: Normal rate and regular rhythm. Heart sounds: No murmur heard. Pulmonary:      Effort: Pulmonary effort is normal. No respiratory distress. Abdominal:      General: There is no distension. Musculoskeletal:      Right lower leg: No edema. Left lower leg: No edema. Skin:     General: Skin is warm and dry. Neurological:      General: No focal deficit present. Mental Status: He is alert and oriented to person, place, and time. Psychiatric:         Mood and Affect: Mood normal.         Behavior: Behavior normal.        Labs  CBC:   Lab Results   Component Value Date/Time    WBC 6.2 01/23/2023 04:11 AM    RBC 3.91 01/23/2023 04:11 AM    HGB 12.8 01/23/2023 04:11 AM    HCT 36.7 01/23/2023 04:11 AM    MCV 94.0 01/23/2023 04:11 AM    RDW 13.5 01/23/2023 04:11 AM     01/23/2023 04:11 AM     CMP:    Lab Results   Component Value Date/Time     01/23/2023 04:11 AM    K 3.8 01/23/2023 04:11 AM     01/23/2023 04:11 AM    CO2 23 01/23/2023 04:11 AM    BUN 11 01/23/2023 04:11 AM    CREATININE 1.1 01/23/2023 04:11 AM    GFRAA >60 06/14/2022 09:02 AM    AGRATIO 1.8 01/23/2023 04:11 AM    LABGLOM >60 01/23/2023 04:11 AM    GLUCOSE 100 01/23/2023 04:11 AM    PROT 5.9 01/23/2023 04:11 AM    CALCIUM 8.5 01/23/2023 04:11 AM    BILITOT 0.3 01/23/2023 04:11 AM    ALKPHOS 65 01/23/2023 04:11 AM    AST 44 01/23/2023 04:11 AM    ALT 46 01/23/2023 04:11 AM     PT/INR:  No results found for: PTINR  Lab Results   Component Value Date    TROPONINI <0.01 01/23/2023       EKG:  I have reviewed EKG with the following interpretation:  Impression: Normal sinus rhythm with PVC    Stress: 1/23/23   Normal myocardial perfusion study. Normal LV size and systolic function. Overall findings represent a low risk study. Cath: 6/2022  1. Right-dominant coronary arterial circulation. There was 100%  occlusion of the mid RCA. This was stented with a 3.5 x 34 mm [place]  drug-eluting stent dilated to 3.5 mm in diameter restoring WANDA III flow  with 0% residual stenosis. In the left system, there is no left main  disease. The left anterior descending artery has trivial plaque  disease.   The circumflex artery has a proximal to mid 70% lesion that is  eccentric and mildly hazy. There is WANDA III flow in the vessel. 2.  Normal left ventricular systolic function. LV ejection fraction of  55%. 3.  Elevated left ventricular end-diastolic pressure at 20 mmHg. 4.  No gradient across the aortic valve on pullback to suggest aortic  stenosis. Old notes reviewed  Telemetry reviewed  Ekg personally reviewed  Chest xray personally reviewed  Echo, cath, and   Medications and labs reviewed  high complexity/medical decision making due to extensive data review, extensive history review, independent review of data  high risk due to acute illness, evaluation of drug-drug interactions, medication management and diagnostic interventions    Assessment/Plan:    Chest pain  CAD s/p PCI with known residual disease  Tobacco abuse  AAA      Assessment: 76-year-old gentleman who has known coronary disease. Had an acute MI with PCI of an occluded right coronary artery with residual circumflex disease that was moderate to severe. Patient reports intermittent chest pain feels very similar to prior to his MI presentation, Yaakov Collin performed today with immediate chest pain diaphoresis, and impending doom feeling on injection. The actual perfusion was read as normal    I discussed with him the risks benefits and alternatives of repeat coronary angiography, I do think that he merits coronary angiography given known residual disease, mimicking symptoms similar to his previous MI. 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 Biomarkers have been negative, EKG without acute ST-T changes. I told him that of course not ideal overall if he could stay overnight we would have a coronary angiogram performed in the morning.   However, he has considerable difficulties and is very sincerely hoping that he can get home tonight in order to take care of his family. I discussed with him the option of discharge today and a fairly soon coronary angiogram as an outpatient which she is amenable to. Plan:  -Continue DAPT with aspirin and ticagrelor  -Continue high potency statin  -As needed nitroglycerin  -Continue beta-blockade  -Discussed case with Dr. Corie Jacobo, will have patient schedule as an outpatient for left heart cath/coronary angiogram      ---    I will address the patient's cardiac risk factors and adjusted pharmacologic treatment as needed. In addition, I have reinforced the need for patient directed risk factor modification. Tobacco use was discussed with the patient and educated on the negative effects. I have asked the patient to not utilize these agents. Thank you for allowing to us to participate in the care or Abran Stoner. Further evaluation will be based upon the patient's clinical course and testing results. All questions and concerns were addressed to the patient/family. Alternatives to my treatment were discussed. The note was completed using EMR. Every effort was made to ensure accuracy; however, inadvertent computerized transcription errors may be present. Chriss Diaz MD  Interventional Cardiology  1/23/2023  4:54 PM    [facility] [address] [place] [address] [place] [address]  Ph: [phone]  Fax: [phone]    NOTE: This report was transcribed using voice recognition software. Every effort was made to ensure accuracy, however, inadvertent computerized transcription errors may be present.

## 2023-01-23 NOTE — H&P
Hospital Medicine History & Physical      PCP: Oumar Beauchamp DO    Date of Admission: 1/22/2023    Date of Service: Pt seen/examined on 1/22/2023    Pt seen/examined face to face on and admitted as inpatient with expected LOS to be two days but can change depending on diagnostic work up and treatment response. Chief Complaint:    Chief Complaint   Patient presents with    Chest Pain     Started 2 hours ago with chest pain. Pt took 2 nitro at home and 1 81mg asa and squad gave 3 more asa and another nitro and pain is now 4/10        History Of Present Illness:      57 y.o. male who presented to [facility] with past medical history of AAA, hypertension, depression, cigarette smoking presented to the ED with chief complaint of chest pain    Patient reported that she has had chest pain like this before previously normally shoots to the back this 1 is different and that instead of going to the low back it went to the upper neck reports that the pain would go to 7/10 exacerbated on exertion alleviated with nitro but this time the nitro did not relieve it immediately. Patient reports that he took around 3 baby aspirin's. Patient otherwise is worried about this being another tag-like prior 1. Patient reports that he is unable to tolerate treadmill previously and was unable to complete the test.  Patient otherwise reports he still smokes otherwise no drinking or drugs.   Patient reports he is compliant with medication no history of DVTs or PEs in the past      Past Medical History:          Diagnosis Date    AAA (abdominal aortic aneurysm) 03/22/2021    Dr. Creston Cheadle    Bilateral hand pain     Chronic left-sided low back pain with left-sided sciatica     Chronic nausea     Chronic seasonal allergic rhinitis due to pollen     DDD (degenerative disc disease), cervical     Essential hypertension     History of acute inferior wall MI 06/13/2022    Dr. Vahe Alves / Stent in RCA    Major depression single episode, in partial remission ([facility] [address]     Tobacco abuse        Past Surgical History:          Procedure Laterality Date    BONE MARROW HARVEST  1994    Donor    WISDOM TOOTH EXTRACTION         Medications Prior to Admission:      Prior to Admission medications    Medication Sig Start Date End Date Taking? Authorizing Provider   metoprolol succinate (TOPROL XL) 25 MG extended release tablet Take 1 tablet by mouth daily 9/9/22   Eneida Low MD   aspirin 81 MG chewable tablet Take 1 tablet by mouth daily 9/9/22   Eneida Low MD   rosuvastatin (CRESTOR) 40 MG tablet Take 1 tablet by mouth nightly 9/9/22   Eneida Low MD   ticagrelor [facility]) 90 MG TABS tablet Take 1 tablet by mouth 2 times daily 9/9/22   Eneida Low MD   famotidine (PEPCID) 20 MG tablet Take 1 tablet by mouth in the morning and 1 tablet before bedtime. Patient not taking: Reported on 1/22/2023 8/5/22   Bo Ngo DO   Nicotine 21-14-7 MG/24HR KIT Taper per package instruction  Patient not taking: Reported on 1/22/2023 6/22/22   YADI Stephens CNP   nitroGLYCERIN (NITROSTAT) 0.4 MG SL tablet Place 1 tablet under the tongue every 5 minutes as needed for Chest pain 6/22/22   YADI Stephens CNP       Allergies:  Codeine and Wellbutrin [bupropion]    Social History:          TOBACCO:   reports that he has been smoking cigarettes. He has a 20.00 pack-year smoking history. He has never used smokeless tobacco.  ETOH:   reports current alcohol use.   E-cigarette/Vaping       Questions Responses    E-cigarette/Vaping Use Former User    Start Date     Passive Exposure     Quit Date     Counseling Given     Comments               Family History:      Family History reviewed with patient, and does not pertain and non-contributory to the current illness        Problem Relation Age of Onset    Heart Disease Mother         triple bypass    Heart Surgery Mother         triple by pass 12     Other Father disappeared in [facility]         Hodgkin's Lymphoma     High Blood Pressure Maternal Uncle        REVIEW OF SYSTEMS:     Constitutional:  No Fever, No Chills, No Night Sweats  ENT/Mouth:  No Nasal Congestion,  No Hoarseness, No new mouth lesion  Eyes:  No Eye Pain, No Redness, No Discharge  Cardiovascular: + Chest Pain, No Orthopnea, No Palpitations  Respiratory:  No Cough, No Sputum, No Dyspnea  Gastrointestinal: No Vomiting, No Diarrhea, No abdominal pain  Genitourinary: No Urinary Frequency, No Hematuria, No Urinary pain  Musculoskeletal:  No worsening Arthralgias, No worsening Myalgias  Skin:  No new Skin Lesions, No new skin rash  Neuro:  No new weakness, No new numbness. Psych:  No suicial ideation, No Violence ideation    PHYSICAL EXAM PERFORMED:    BP (!) 136/92   Pulse 71   Temp 98.1 °F (36.7 °C) (Oral)   Resp 14   Ht 5' 6\" (1.676 m)   Wt 118 lb 9.7 oz (53.8 kg)   SpO2 98%   BMI 19.14 kg/m²     General appearance:  mild acute distress, appears older than stated age  HEENT:   atraumatic, sclera anicteric, Conjunctivae clear. Neck: Supple,Trachea midline, no goiter  Respiratory:minimal accessory muscle usage, Normal respiratory effort. Clear to auscultation, bilaterally without wheezing  Cardiovascular:  Regular rate and rhythm, capillary refill 2 seconds  Abdomen: Soft, non-tender, non-distended with normal bowel sounds. Musculoskeletal:  No clubbing, cyanosis. trace edema LE bilaterally. Skin: turgor normal.  No new rashes or lesions. Neurologic: Alert and oriented x4, no new focal sensory/motor deficits. Labs:     Recent Labs     01/22/23 2025   WBC 7.1   HGB 13.6   HCT 40.7        Recent Labs     01/22/23 2025      K 3.5      CO2 23   BUN 13   CREATININE 1.2   CALCIUM 9.3     No results for input(s): AST, ALT, BILIDIR, BILITOT, ALKPHOS in the last 72 hours. No results for input(s): INR in the last 72 hours.   Recent Labs     01/22/23 2025   TROPONINI <0.01       Urinalysis:    No results found for: Adriel Chea, BACTERIA, [address] [facility], B[place], [address], [facility] [facility]    Radiology:     CXR: I have reviewed the CXR with the following interpretation:   No acute process  EKG:  I have reviewed the EKG with the following interpretation:   Normal sinus rhythm  XR CHEST (2 VW)   Final Result   No acute cardiopulmonary abnormality. NM Cardiac Stress Test Nuclear Imaging    (Results Pending)       ASSESSMENT AND PLAN:    Active Hospital Problems    Diagnosis Date Noted    Chest pain [R07.9] 01/22/2023     Priority: Medium     Atypical chest pain:  Nuclear stress test ordered  Aspirin received prior to arrival  Continue trend troponin    GERD: Continue home medication  Hyperlipidemia: Controlled on home Statin. Outpatient PCP follow up post-discharge  CAD: Continued home medication  Cigarette smoking: Education and counseling        Face-to-face discussion with the primary ER physician in regards to symptoms, history, physical exam, diagnosis and treatment, collaborative decision was to admit the patient.     Diet: NPO except meds ordered past midnight    DVT Prophylaxis: lovenox    Dispo:   Expected LOS of two days         David Romero DO

## 2023-01-23 NOTE — PROGRESS NOTES
Medication Reconciliation    List of medications patient is currently taking is complete. Source of information: 1.  Conversation with patient at bedside                                      2. EPIC records      Allergies  Codeine and Wellbutrin [bupropion]     NAVDEEP [facility], PharmD, BCPS  1/23/2023 10:55 AM

## 2023-01-23 NOTE — ED PROVIDER NOTES
[facility]  eMERGENCY dEPARTMENT eNCOUnter      Pt Name: Adele Rodriguez  MRN: [id number]  [place] 1960  Date of evaluation: 1/22/2023  Provider: Dyan Salmeron MD    CHIEF COMPLAINT       Chief Complaint   Patient presents with    Chest Pain     Started 2 hours ago with chest pain. Pt took 2 nitro at home and 1 81mg asa and squad gave 3 more asa and another nitro and pain is now 4/10         [address] time was 0 minutes, excluding separately reportable procedures. There was a high probability of clinically significant/life threatening deterioration in the patient's condition which required my urgent intervention. HISTORY OF PRESENT ILLNESS  (Location/Symptom, Timing/Onset, Context/Setting, Quality, Duration, Modifying Factors, Severity.)   History From: Patient      Adele Rodriguez is a 57 y.o. male who presents to the emergency department with sharp left-sided chest pain that started about 2 hours prior to arrival.  He was at home when the pain started. He has had a previous heart attack back in June. He states the pain feels similar but not as severe. It radiates through to his back. He took 2 nitroglycerin and 181 mg aspirin. He states that seemed to help. He was given a nitroglycerin and aspirin by EMS. He states that helped more. He states he still having some mild discomfort at this time, a 4 out of 10, it was much more severe earlier. He states the pain makes it hard to breathe although his not really short of breath. He had no nausea or diaphoresis. Nursing Notes were reviewed and I agree.       SCREENINGS        [employer] Coma Scale  Eye Opening: Spontaneous  Best Verbal Response: Oriented  Best Motor Response: Obeys commands  [employer] Coma Scale Score: 15                CIWA Assessment  BP: 130/87  Heart Rate: 79           REVIEW OF SYSTEMS    (2-9 systems for level 4, 10 or more for level 5)     HEENT: Negative. Cardiovascular: Sharp left-sided chest pain as above, somewhat worse with inspiration. Pulmonary: No shortness of breath or cough. GI: No abdominal pain, nausea, or vomiting. Musculoskeletal: No leg pain or leg swelling. Neuro: No dizziness or passing out. Except as noted above the remainder of the review of systems was reviewed and negative. PAST MEDICAL HISTORY     Past Medical History:   Diagnosis Date    AAA (abdominal aortic aneurysm) 03/22/2021    Dr. Sun Barker    Bilateral hand pain     Chronic left-sided low back pain with left-sided sciatica     Chronic nausea     Chronic seasonal allergic rhinitis due to pollen     DDD (degenerative disc disease), cervical     Essential hypertension     History of acute inferior wall MI 06/13/2022    Dr. Leilani Sandifer / Stent in RCA    Major depression single episode, in partial remission ([facility] [address]     Tobacco abuse          SURGICAL HISTORY       Past Surgical History:   Procedure Laterality Date    BONE MARROW HARVEST  1994    Donor    WISDOM TOOTH EXTRACTION           CURRENT MEDICATIONS       Previous Medications    ASPIRIN 81 MG CHEWABLE TABLET    Take 1 tablet by mouth daily    FAMOTIDINE (PEPCID) 20 MG TABLET    Take 1 tablet by mouth in the morning and 1 tablet before bedtime.     METOPROLOL SUCCINATE (TOPROL XL) 25 MG EXTENDED RELEASE TABLET    Take 1 tablet by mouth daily    NICOTINE 21-14-7 MG/24HR KIT    Taper per package instruction    NITROGLYCERIN (NITROSTAT) 0.4 MG SL TABLET    Place 1 tablet under the tongue every 5 minutes as needed for Chest pain    ROSUVASTATIN (CRESTOR) 40 MG TABLET    Take 1 tablet by mouth nightly    TICAGRELOR (BRILINTA) 90 MG TABS TABLET    Take 1 tablet by mouth 2 times daily       ALLERGIES     Codeine and Wellbutrin [bupropion]    FAMILY HISTORY       Family History   Problem Relation Age of Onset    Heart Disease Mother         triple bypass    Heart Surgery Mother         triple by pass 12     Other Father disappeared in [facility]         Hodgkin's Lymphoma     High Blood Pressure Maternal Uncle           SOCIAL HISTORY       Social History     Socioeconomic History    Marital status:     Number of children: 2   Occupational History    Occupation:     Tobacco Use    Smoking status: Every Day     Packs/day: 1.00     Years: 20.00     Pack years: 20.00     Types: Cigarettes    Smokeless tobacco: Never   Vaping Use    Vaping Use: Former   Substance and Sexual Activity    Alcohol use: Yes     Comment: Social     Drug use: No    Sexual activity: Not Currently     Partners: Female     Comment: wife     Social Determinants of Health     Financial Resource Strain: Low Risk     Difficulty of Paying Living Expenses: Not hard at all   Food Insecurity: No Food Insecurity    Worried About [email or web address] in the Last Year: Never true    [address] in the Last Year: Never true         PHYSICAL EXAM    (up to 7 for level 4, 8 or more for level 5)     ED Triage Vitals [01/22/23 2019]   BP Temp Temp Source Heart Rate Resp SpO2 Height Weight   116/87 98.4 °F (36.9 °C) Oral 78 16 98 % 5' 6\" (1.676 m) 125 lb (56.7 kg)       General: Alert thin white male in no acute distress. Head: Atraumatic and normocephalic. Eyes: No conjunctival injection. No pallor. Pupils equal round reactive. ENT: Lety Daquan is clear. Oropharynx moist without erythema. Neck: Supple, nontender, no adenopathy. Heart: Regular rate and rhythm. No murmurs or gallops noted. Lungs: Breath sounds equal bilaterally and clear. Abdomen: Soft, nondistended, nontender. Musculoskeletal: No lower extremity edema. Calf tenderness. Intact symmetrical distal pulses. Skin: Warm and dry, good turgor. No pallor or cyanosis. No diaphoresis. Neuro: Awake, alert, oriented. No focal motor deficits.       DIFFERENTIAL DIAGNOSIS   Differential includes but is not limited to chest wall pain, GERD, esophageal spasm, angina, myocardial infarction, pulmonary embolism, aortic dissection, other. DIAGNOSTIC RESULTS     EKG: All EKG's are interpreted by Miranda Yoder MD in the absence of a cardiologist.    Normal sinus rhythm, rate of 70, sinus arrhythmia. Rhythm strip shows a sinus rhythm with a rate of 70, MI interval 160 ms, QRS 68 ms with no other ectopy as interpreted by me. Compared EKG dated 6/22/2022 previously noted T wave inversions in the inferior leads is no longer present. EKG #2: Sinus rhythm, rate of 81, PACs. Rhythm strip shows a sinus rhythm with a rate of 81, MI interval of 156 ms, QRS of 70 ms with no other ectopy as interpreted by me. Compared to EKG done earlier today, the PACs are new, no other significant changes noted. RADIOLOGY:   Non-plain film images such as CT, Ultrasound and MRI are read by the radiologist. Plain radiographic images are visualized and preliminarily interpreted Miranda Yoder MD with the below findings:      Interpretation per the Radiologist below, if available at the time of this note:    XR CHEST (2 VW)   Final Result   No acute cardiopulmonary abnormality. ED BEDSIDE ULTRASOUND:   Performed by ED Physician - none    LABS:  Labs Reviewed   CBC WITH AUTO DIFFERENTIAL   BASIC METABOLIC PANEL W/ REFLEX TO MG FOR LOW K   TROPONIN   MAGNESIUM       All other labs were within normal range or not returned as of this dictation.     EMERGENCY DEPARTMENT COURSE and DIFFERENTIAL DIAGNOSIS/MDM:   Vitals:    Vitals:    01/22/23 2019 01/22/23 2101 01/22/23 2216   BP: 116/87 116/87 130/87   Pulse: 78 86 79   Resp: 16  21   Temp: 98.4 °F (36.9 °C)     TempSrc: Oral     SpO2: 98%  98%   Weight: 125 lb (56.7 kg)     Height: 5' 6\" (1.676 m)         Patient was given the following medications:  Medications   nitroglycerin (NITRO-BID) 2 % ointment 0.5 inch (0.5 inches Topical Not Given 1/22/23 2101)   ondansetron (ZOFRAN) injection 4 mg (4 mg IntraVENous Not Given 1/22/23 2215) fentaNYL (SUBLIMAZE) injection 50 mcg (50 mcg IntraVENous Given 1/22/23 2102)   morphine (PF) injection 4 mg (4 mg IntraVENous Given 1/22/23 2215)             Is this patient to be included in the SEP-1 Core Measure due to severe sepsis or septic shock? No   Exclusion criteria - the patient is NOT to be included for SEP-1 Core Measure due to: Infection is not suspected    Chronic Conditions affecting care: Below   has a past medical history of AAA (abdominal aortic aneurysm) (03/22/2021), Bilateral hand pain, Chronic left-sided low back pain with left-sided sciatica, Chronic nausea, Chronic seasonal allergic rhinitis due to pollen, DDD (degenerative disc disease), cervical, Essential hypertension, History of acute inferior wall MI (06/13/2022), Major depression single episode, in partial remission ([facility] [address] and Tobacco abuse. CONSULTS: (Who and What was discussed)  IP CONSULT TO HOSPITALIST  Perfect Serve Consult Hospitalists. Records Reviewed (Source): FINDINGS:    Cath Lab Report 6/14/22  1. Right-dominant coronary arterial circulation. There was 100%  occlusion of the mid RCA. This was stented with a 3.5 x 34 mm [place]  drug-eluting stent dilated to 3.5 mm in diameter restoring WANDA III flow  with 0% residual stenosis. In the left system, there is no left main  disease. The left anterior descending artery has trivial plaque  disease. The circumflex artery has a proximal to mid 70% lesion that is  eccentric and mildly hazy. There is WANDA III flow in the vessel. 2.  Normal left ventricular systolic function. LV ejection fraction of  55%. 3.  Elevated left ventricular end-diastolic pressure at 20 mmHg. 4.  No gradient across the aortic valve on pullback to suggest aortic  stenosis. CC/HPI Summary, DDx, ED Course, and Reassessment: This patient presents with chest pain as above. The pain is as described above.   Importantly the pain he states is similar to the pain he had with his previous STEMI back in June. He had a stent placed in his right coronary artery. He took nitroglycerin and aspirin at home which helped. He was given nitroglycerin and aspirin per EMS which helped. He still had some discomfort here. He refused any further nitroglycerin here. He has no acute EKG changes here. Later in his stay he states his pain had gotten somewhat worse. I did a repeat EKG. Again there were no changes. His troponin was not elevated. His chest x-ray was normal.  His pain was not typical of pulmonary embolism. He was not tachycardic or hypoxic. I have a low index suspicion for pulmonary embolism. There is pain was not typical for aortic dissection. I have a low index of suspicion for dissection. His old records were reviewed. He had a STEMI in June 2022. He had a 100% occlusion of the right coronary artery. He had a stent placed. He had a proximal to mid 70% lesion in the circumflex artery. He had a normal left ventricular systolic function with an ejection fraction of 55%. In light of the fact that his pain is similar to his previous cardiac pain I think that he will require admission for monitoring and serial cardiac enzymes and further work-up as indicated per the hospitalist and cardiology. I discussed this treatment plan with the patient. He is agreeable. Disposition Considerations (tests considered but not done, Admit vs D/C, Shared Decision Making, Pt Expectation of Test or Tx.): Patient has known coronary artery disease. He has chest pain similar to his previous STEMI, but \"not as severe\". He has no acute EKG changes. His troponin not elevated. He will be admitted for monitoring and serial troponins and further work-up. Test results, diagnosis, and treatment plan were discussed with the patient. He is agreeable. I am the Primary Clinician of Record. PROCEDURES:  None    FINAL IMPRESSION      1.  Chest pain, unspecified type          DISPOSITION/PLAN DISPOSITION Decision To Admit 01/22/2023 10:46:42 PM      PATIENT REFERRED TO:  No follow-up provider specified.     DISCHARGE MEDICATIONS:  New Prescriptions    No medications on file       (Please note that portions of this note were completed with a voice recognition program.  Efforts were made to edit the dictations but occasionally words are mis-transcribed.)    Kaylyn Gale MD  Attending Emergency Physician        Thom Powers MD  01/22/23 1682

## 2023-01-23 NOTE — TELEPHONE ENCOUNTER
Please contact patient and schedule LHC with Dr. Orlando Singh. Left Heart Cath Pre Procedure Instructions    Date: _____________________________    Te Risk at: _________________________    Procedure time:________________________      The morning of your procedure you will park in the hospital parking lot and report directly to the cath lab to check in.     Pre-Procedure Instructions   You will need to fast for at least 8 hours prior to procedure. No caffeine the morning of. Hold all diabetic medications including, Metfomin. If you take Lantus/Levemir only take ½ your normal dose the evening before. All other medications can be taken in the morning with sips of water. You will need to take your Asprin and Brilinta the morning of the procedure with sips of water. Do not use any lotions, creams or perfume the morning of procedure. Please have a responsible adult to drive you home after procedure. We advise you have someone to stay with you for 24 hours following procedure for precautionary measures. Depending on procedure you may require an overnight stay. Cath lab will provide you with all post procedure instructions. If you have any questions regarding the procedure itself or medications, please call [phone] and ask to speak with a nurse.

## 2023-01-23 NOTE — ED NOTES
Attending provider contacted via perfect serve. Pt states that he was given meds during stress test which made him SOB and have mid sternal chest pain. Pt SaO2 at 100% and in no distress. Requesting to eat as well. Will await response.       Gena Olmos RN  01/23/23 6073
Capo on 4W called and report given. All questions answered.       Elías Hortonr, RN  01/23/23 2205
Pt advising nurse that he would like to speak with someone regarding his care. Pt is agitated and stating that he \"feels this is abuse by not seeing a provider, I don't care if results are back or not someone needs to come here now! \". Perfect serve was sent to attending. Petrona Parmar,  advised.       Hanna Knapp RN  01/23/23 3491
Pt agitated stating that he \"needs to see a provider now! \"  Pt is yelling, stating that he wants to speak to someone in charge. Charge nurse notified of pts request and that Perfect Serve was sent to attending.       Baron Isrrael RN  01/23/23 [address] [place] [address]  01/23/23 5381
Pt pushed call light and is stating that he has still not seen/heard from anyone. Pt demanding that he speak with someone in administration or higher than charge nurse. Advised charge nurse and manager notified.       Rob Head RN  01/23/23 3158
Comment: FBSE. AKs on R lateral cheek (LN2 x2). FFA (pt to cont. current tx regimen prescribed by Dr. Cabrales - Plaquenil 200mg BID, steroid foam, steroid shampoo, Propecia 1mg PO QD, and monthly ILK injections). Milium on L temple (tx w/ hyfrecation).
Detail Level: Simple

## 2023-01-23 NOTE — PROGRESS NOTES
Pt states he no longer has chest pain, all his pain is in his back and neck at this time which is chronic per pt

## 2023-01-23 NOTE — PROGRESS NOTES
Oriented pt to room, call light, fall precautions, phone and menu system. Call light and phone within reach. White board updated. Bed in lowest position and wheels locked. 2/4 side rails up. Pt A&Ox4. Pt c/o pain to chest area. Pt also states he just doesn't feel right since the stress test this AM. Pt denies any needs at current time.  Electronically signed by Rosi Nicholson RN on 1/23/2023 at 3:34 PM

## 2023-01-23 NOTE — PROGRESS NOTES
Hospitalist Progress Note      PCP: Em Godoy DO    Date of Admission: 1/22/2023    Chief Complaint: cp    Hospital Course: 62m with history of CAD, s/p PCI 6/22, chronic back pain, presents with recurrence of chest discomfort similar to that leading to prior pci as he notes a difference from that he experiences associated with back pain. He denies fever, chills, cough. He Is currently seen s/p lexiscan complaining of vague chest discomfort which he is unable to describe other than he \"hasn't felt right\" since lexiscan administered    Subjective: No resp distress on room air, awake, alert, appearing agitated and anxious      Medications:  Reviewed    Infusion Medications    sodium chloride       Scheduled Medications    nitroglycerin  0.5 inch Topical Once    ondansetron  4 mg IntraVENous Once    sodium chloride flush  10 mL IntraVENous 2 times per day    enoxaparin  40 mg SubCUTAneous Daily    aspirin  81 mg Oral Daily    metoprolol succinate  25 mg Oral Daily    rosuvastatin  40 mg Oral Nightly    ticagrelor  90 mg Oral BID    nicotine  1 patch TransDERmal Nightly     PRN Meds: sodium chloride flush, sodium chloride, potassium chloride, magnesium sulfate, promethazine **OR** ondansetron, acetaminophen **OR** acetaminophen, nitroGLYCERIN, morphine **OR** morphine      Intake/Output Summary (Last 24 hours) at 1/23/2023 1413  Last data filed at 1/23/2023 0936  Gross per 24 hour   Intake 110 ml   Output --   Net 110 ml       Physical Exam Performed:    BP (!) 151/83   Pulse 66   Temp 97.9 °F (36.6 °C) (Axillary)   Resp 13   Ht 5' 6\" (1.676 m)   Wt 118 lb 9.7 oz (53.8 kg)   SpO2 96%   BMI 19.14 kg/m²     General appearance: No apparent distress, appears stated age and cooperative. HEENT: Pupils equal, round, and reactive to light. Conjunctivae/corneas clear. Neck: Supple, with full range of motion. No jugular venous distention. Trachea midline. Respiratory:  Normal respiratory effort.  No use of accessory muscles  Cardiovascular: Regular rate and rhythm    Abdomen: Soft, non-tender, non-distended    Musculoskeletal: No clubbing, cyanosis or edema bilaterally. No calf tenderness,  (+) dorsalis pedis bilat, equal  Skin: Skin color, texture, turgor normal.     Neurologic:   grossly non-focal.  Psychiatric: Alert and oriented, thought content appropriate, normal insight         Labs:   Recent Labs     01/22/23 2025 01/23/23 0411   WBC 7.1 6.2   HGB 13.6 12.8*   HCT 40.7 36.7*    156     Recent Labs     01/22/23 2025 01/23/23 0411    136   K 3.5 3.8    104   CO2 23 23   BUN 13 11   CREATININE 1.2 1.1   CALCIUM 9.3 8.5     Recent Labs     01/23/23 0411   AST 44*   ALT 46*   BILITOT 0.3   ALKPHOS 65     No results for input(s): INR in the last 72 hours. Recent Labs     01/22/23 2025 01/23/23 0234 01/23/23 0411   TROPONINI <0.01 <0.01 <0.01       Urinalysis:    No results found for: Jose Carlos Kugel, BACTERIA, RBCUA, BLOODU, SPECGRAV, GLUCOSEU    Radiology:  XR CHEST (2 VW)   Final Result   No acute cardiopulmonary abnormality.          NM Cardiac Stress Test Nuclear Imaging    (Results Pending)           Assessment/Plan:    Active Hospital Problems    Diagnosis Date Noted    Chest pain [R07.9] 01/22/2023     Priority: Medium     CP  - neg trop, stress pending  - check d dimer  - will repeat ekg, consult Cardiology   - statin, asa, brilinta, nitro paste, prn mso4,     Chronic back pain  - mso4 prn    Chronic tob use  - nicotine patch     Diet: Diet NPO Exceptions are: Ice Chips, Sips of Water with Meds  Code Status: Full Code       Yosi Dumont MD

## 2023-01-24 VITALS
RESPIRATION RATE: 18 BRPM | HEIGHT: 66 IN | TEMPERATURE: 97.7 F | WEIGHT: 114.2 LBS | OXYGEN SATURATION: 94 % | HEART RATE: 70 BPM | SYSTOLIC BLOOD PRESSURE: 119 MMHG | DIASTOLIC BLOOD PRESSURE: 68 MMHG | BODY MASS INDEX: 18.35 KG/M2

## 2023-01-24 LAB
A/G RATIO: 1.8 (ref 1.1–2.2)
ALBUMIN SERPL-MCNC: 3.9 G/DL (ref 3.4–5)
ALP BLD-CCNC: 64 U/L (ref 40–129)
ALT SERPL-CCNC: 32 U/L (ref 10–40)
ANION GAP SERPL CALCULATED.3IONS-SCNC: 11 MMOL/L (ref 3–16)
AST SERPL-CCNC: 28 U/L (ref 15–37)
BASOPHILS ABSOLUTE: 0.1 K/UL (ref 0–0.2)
BASOPHILS RELATIVE PERCENT: 1 %
BILIRUB SERPL-MCNC: 0.5 MG/DL (ref 0–1)
BUN BLDV-MCNC: 10 MG/DL (ref 7–20)
CALCIUM SERPL-MCNC: 8.9 MG/DL (ref 8.3–10.6)
CHLORIDE BLD-SCNC: 105 MMOL/L (ref 99–110)
CO2: 22 MMOL/L (ref 21–32)
CREAT SERPL-MCNC: 1 MG/DL (ref 0.8–1.3)
EOSINOPHILS ABSOLUTE: 0.3 K/UL (ref 0–0.6)
EOSINOPHILS RELATIVE PERCENT: 4.3 %
GFR SERPL CREATININE-BSD FRML MDRD: >60 ML/MIN/{1.73_M2}
GLUCOSE BLD-MCNC: 76 MG/DL (ref 70–99)
HCT VFR BLD CALC: 39.5 % (ref 40.5–52.5)
HEMOGLOBIN: 13.4 G/DL (ref 13.5–17.5)
LYMPHOCYTES ABSOLUTE: 1.3 K/UL (ref 1–5.1)
LYMPHOCYTES RELATIVE PERCENT: 20.9 %
MCH RBC QN AUTO: 32.1 PG (ref 26–34)
MCHC RBC AUTO-ENTMCNC: 34 G/DL (ref 31–36)
MCV RBC AUTO: 94.2 FL (ref 80–100)
MONOCYTES ABSOLUTE: 0.5 K/UL (ref 0–1.3)
MONOCYTES RELATIVE PERCENT: 8.9 %
NEUTROPHILS ABSOLUTE: 3.9 K/UL (ref 1.7–7.7)
NEUTROPHILS RELATIVE PERCENT: 64.9 %
PDW BLD-RTO: 13.6 % (ref 12.4–15.4)
PLATELET # BLD: 161 K/UL (ref 135–450)
PMV BLD AUTO: 8.6 FL (ref 5–10.5)
POTASSIUM REFLEX MAGNESIUM: 4.1 MMOL/L (ref 3.5–5.1)
RBC # BLD: 4.19 M/UL (ref 4.2–5.9)
SODIUM BLD-SCNC: 138 MMOL/L (ref 136–145)
TOTAL PROTEIN: 6.1 G/DL (ref 6.4–8.2)
WBC # BLD: 6 K/UL (ref 4–11)

## 2023-01-24 PROCEDURE — 85025 COMPLETE CBC W/AUTO DIFF WBC: CPT

## 2023-01-24 PROCEDURE — 94760 N-INVAS EAR/PLS OXIMETRY 1: CPT

## 2023-01-24 PROCEDURE — 6360000002 HC RX W HCPCS: Performed by: INTERNAL MEDICINE

## 2023-01-24 PROCEDURE — 80053 COMPREHEN METABOLIC PANEL: CPT

## 2023-01-24 PROCEDURE — 96376 TX/PRO/DX INJ SAME DRUG ADON: CPT

## 2023-01-24 PROCEDURE — 36415 COLL VENOUS BLD VENIPUNCTURE: CPT

## 2023-01-24 PROCEDURE — G0378 HOSPITAL OBSERVATION PER HR: HCPCS

## 2023-01-24 PROCEDURE — 2580000003 HC RX 258: Performed by: INTERNAL MEDICINE

## 2023-01-24 PROCEDURE — 6370000000 HC RX 637 (ALT 250 FOR IP): Performed by: INTERNAL MEDICINE

## 2023-01-24 RX ORDER — NICOTINE 21-14-7MG
KIT TRANSDERMAL
Qty: 1 KIT | Refills: 0 | Status: SHIPPED | OUTPATIENT
Start: 2023-01-24

## 2023-01-24 RX ADMIN — TICAGRELOR 90 MG: 90 TABLET ORAL at 09:16

## 2023-01-24 RX ADMIN — MORPHINE SULFATE 2 MG: 2 INJECTION, SOLUTION INTRAMUSCULAR; INTRAVENOUS at 09:16

## 2023-01-24 RX ADMIN — MORPHINE SULFATE 2 MG: 2 INJECTION, SOLUTION INTRAMUSCULAR; INTRAVENOUS at 03:30

## 2023-01-24 RX ADMIN — ASPIRIN 81 MG CHEWABLE TABLET 81 MG: 81 TABLET CHEWABLE at 09:16

## 2023-01-24 RX ADMIN — METOPROLOL SUCCINATE 25 MG: 25 TABLET, EXTENDED RELEASE ORAL at 09:16

## 2023-01-24 RX ADMIN — Medication 10 ML: at 09:18

## 2023-01-24 ASSESSMENT — PAIN DESCRIPTION - DESCRIPTORS
DESCRIPTORS: ACHING;CRAMPING;DISCOMFORT
DESCRIPTORS: DISCOMFORT;ACHING
DESCRIPTORS: DISCOMFORT;ACHING

## 2023-01-24 ASSESSMENT — PAIN DESCRIPTION - ORIENTATION
ORIENTATION: RIGHT;LEFT;MID
ORIENTATION: LOWER

## 2023-01-24 ASSESSMENT — PAIN DESCRIPTION - PAIN TYPE
TYPE: CHRONIC PAIN
TYPE: CHRONIC PAIN

## 2023-01-24 ASSESSMENT — PAIN DESCRIPTION - LOCATION
LOCATION: BACK
LOCATION: BACK
LOCATION: BACK;GENERALIZED

## 2023-01-24 ASSESSMENT — PAIN SCALES - GENERAL
PAINLEVEL_OUTOF10: 5
PAINLEVEL_OUTOF10: 7
PAINLEVEL_OUTOF10: 0
PAINLEVEL_OUTOF10: 7
PAINLEVEL_OUTOF10: 0

## 2023-01-24 ASSESSMENT — PAIN DESCRIPTION - ONSET
ONSET: ON-GOING
ONSET: ON-GOING

## 2023-01-24 ASSESSMENT — PAIN DESCRIPTION - FREQUENCY
FREQUENCY: CONTINUOUS
FREQUENCY: CONTINUOUS

## 2023-01-24 ASSESSMENT — PAIN - FUNCTIONAL ASSESSMENT
PAIN_FUNCTIONAL_ASSESSMENT: PREVENTS OR INTERFERES SOME ACTIVE ACTIVITIES AND ADLS
PAIN_FUNCTIONAL_ASSESSMENT: PREVENTS OR INTERFERES SOME ACTIVE ACTIVITIES AND ADLS

## 2023-01-24 NOTE — PROGRESS NOTES
Pt woke up, agitated, screaming for nurse to  get his telemetry off of him and get him out of here. This nurse stood and talked with Pt , making eye contact. This seemed to help calm him down. Many complaints about being in pain and physicians not taking time to talk to him and tell him the plan. Medicated with Morphine 2 mg IVP . Fluids given . Stood and talked for a while longer . Upon leaving, Pt was calm.

## 2023-01-24 NOTE — CARE COORDINATION
INITIAL CASE MANAGEMENT ASSESSMENT    Reviewed chart, met with patient to assess possible discharge needs. Explained Case Management role/services. Living Situation: Patient lives with his wife and 2 children in a house with 2 steps to enter. ADLs: Independent     DME: No DME    PT/OT Recs: None     Active Services: None     Transportation: Active /Needs assist with transport to home. Medications: [employer] on [address]/No barriers    PCP: Werner Naylor,       HD/PD: N/A    PLAN/COMMENTS: Plan: return to home with wife. LYFT ride arranged for transport to home. SW/CM provided contact information for patient or family to call with any questions. SW/CM will follow and assist as needed. Electronically signed by Kesha Roca RN on 1/24/2023 at 12:40 PM  Flex ride sent  Flexible ride link valid between 1/24, 12:48 PM EST and 1/25, 12:48 PM EST  [place]  Full name  [address] [place] [address] phone number  [phone] type  Lyft (4 seats)  [facility] may vary from the original request due to minor pickup and drop-off changes. For example, an address may vary if a rider is picked up across the street.   Request details  Coordinator name  Herbert Martinez  Date and time  1/24/23, 12:48 PM EST  Flex ride ID  [id number]  Internal note   at Main Entrance to hospital

## 2023-01-24 NOTE — TELEPHONE ENCOUNTER
Attempted to reach the patient and the number on file just rings until it finally goes to busy signal. I will try to reach out again later this week.

## 2023-01-24 NOTE — DISCHARGE SUMMARY
[facility]   Discharge Summary    Patient:  Luis A Arreola  YOB: 1960    MRN: [id number]   Acct: [de-identified]    Primary Care Physician: Jose Aj DO    Admit date:  1/22/2023    Discharge date:   1/24/23      Discharge Diagnoses:   Chest pain  Principal Problem:    Chest pain  Resolved Problems:    * No resolved hospital problems. *        Admitted for: (HPI)Chest pain    Hospital Course: 62m with CAD, presents with recurrence of chest pain similar to prior presentation leading to % RCA lesion. Similar ot prior evaluation, he did not tolerate pharmacological stress. Cardiology was consulted with recommendation for repeat angiogram, with patient agreeable to outpatient [facility] to be scheduled.     Consultants:  Justa Kumar    Discharge Medications:       Medication List        CONTINUE taking these medications      aspirin 81 MG chewable tablet  Take 1 tablet by mouth daily     metoprolol succinate 25 MG extended release tablet  Commonly known as: TOPROL XL  Take 1 tablet by mouth daily     Nicotine 21-14-7 MG/24HR Kit  Taper per package instruction     nitroGLYCERIN 0.4 MG SL tablet  Commonly known as: Nitrostat  Place 1 tablet under the tongue every 5 minutes as needed for Chest pain     rosuvastatin 40 MG tablet  Commonly known as: CRESTOR  Take 1 tablet by mouth nightly     ticagrelor 90 MG Tabs tablet  Commonly known as: BRILINTA  Take 1 tablet by mouth 2 times daily               Where to Get Your Medications        These medications were sent to [address] [place] [address] Rd, [address] - F [phone] [address] [address] [address] [place] [address]      Phone: [phone]   Nicotine 21-14-7 MG/24HR Kit           Physical Exam:    Vitals:  Vitals:    01/24/23 0729 01/24/23 0808 01/24/23 0809 01/24/23 1212   BP:  119/68 119/68    Pulse:  71 74 70   Resp:       Temp:  98.2 °F (36.8 °C) 98.2 °F (36.8 °C) 97.7 °F (36.5 °C) TempSrc:   Oral Oral   SpO2:  97% 97% 94%   Weight: 114 lb 3.2 oz (51.8 kg)      Height:         Weight: Weight: 114 lb 3.2 oz (51.8 kg)     24 hour intake/output:  Intake/Output Summary (Last 24 hours) at 1/24/2023 1455  Last data filed at 1/23/2023 1456  Gross per 24 hour   Intake 240 ml   Output --   Net 240 ml       General appearance - alert, well appearing, and in no distress  Chest - no use of accessory muscles, speaking in full sentences  Heart - normal rate, regular rhythm   Abdomen - soft, nontender, nondistended   Obese: No; Protuberant: No   Neurological - alert, oriented, normal speech, no focal findingsb  Extremities - peripheral pulses normal, no pedal edema, no calf tenderness  Skin - normal coloration and turgor     Radiology reports as per the Radiologist  Radiology: XR CHEST (2 VW)    Result Date: 1/22/2023  EXAMINATION: TWO XRAY VIEWS OF THE CHEST 1/22/2023 8:28 pm COMPARISON: None. HISTORY: ORDERING SYSTEM PROVIDED HISTORY: Chest Pain TECHNOLOGIST PROVIDED HISTORY: Reason for exam:->Chest Pain Reason for Exam: Chest Pain FINDINGS: The cardiomediastinal silhouette is not enlarged. No pleural effusion or pneumothorax. No focal consolidation. No acute cardiopulmonary abnormality.      NM Cardiac Stress Test Nuclear Imaging    Result Date: 1/23/2023  Cardiac Perfusion Imaging  Demographics   Patient Name       Jacqueline BURGESS   Date of Study      01/23/2023         Gender              Male   Patient Number     [id number]         Date of Birth       1960   Visit Number       [id number]          Age                 57 year(s)   Accession Number   [id number]         Room Number         OTF1   Corporate ID       [id number]            NM Technician       Jeffrey Duarte, RT                                                            [employer]   Nurse              Karyn De Leon,   [address] [facility]                     RN                 Physician           Sarbjit Alberto Dano Washington MD   Ordering Physician India Gee.,                     DO   The procedure was explained in detail to the patient. Risks,  complications and alternative treatments were reviewed. Written consent  was obtained. Procedure Procedure Type:   Nuclear Stress Test:NM MYOCARDIAL SPECT REST EXERCISE OR RX   Study location: [facility] - Nuclear Medicine   Indications: Chest pain. Hospital Status: Outpatient. Height: 66 inches Weight: 118 pounds  Risk Factors   The patient risk factors include:prior PCI on 06/14/2022;physical activity,  Current/Recent(w/in 1 year) tobacco use, treated hypercholesterolemia,  treated hypertension, family history of premature CAD, dyslipidemia, prior  MI and (pack years: 21). Conclusions   Summary  Normal myocardial perfusion study. Normal LV size and systolic function. Overall findings represent a low risk study. Stress Protocols   Resting ECG  Normal sinus rhythm. Normal ECG. Resting HR:62 bpm  Resting BP:130/77 mmHg   Pre-stress physical exam: Patient  complaints of mid chest pain 3/10. Heart  and lung sounds unremarkable. 02  saturation 97% on room air. Adult regular  BP cuff used. Troponin T negative times  three. Patient states he is not able to  walk on the treadmill. For patient  safety, to proceed with Lexiscan Myoview  stress test.  Stress Protocol:Pharmacologic - Lexiscan's  Peak HR:104 bpm                           HR/BP product:11531  Peak BP:137/86 mmHg  Predicted HR: 156 bpm  % of predicted HR: 67  Test duration: 1 min and 40 sec  Reason for termination:Completed   ECG Findings  No ischemic ECG changes. Arrhythmias  Rare premature atrial and ventricular contractions. Symptoms  Lexiscan 0.4 mg IV given followed by brief shortness of breath and  abdominal cramping. 02 saturation 99% on room air. Mid chest pain  resolved by end of test.   Complications  Procedure complication was none. Stress Interpretation  Non-diagnostic EKG response due to failure to reach target heart rate. Procedure Medications   - Lexiscan I.V. 0.4 mg.10 sec  Imaging Protocols   - One Day   Rest                       Stress   Isotope:Myoview            Isotope: Myoview  Isotope dose:12.6 mCi      Isotope dose:33.5 mCi  Administration Route:I.V.   Administration Route:I.V.  Date:01/23/2023 00:00      Date:01/16/2023 00:00                              Technique:     Gated  Imaging Results   Applied corrections              Study artifacts   - Attenuation correction         1) Diaphragmatic  applied                          attenuation   High risk findings           Summed scores   - LV dilatation (TID) :      - Summed stress score: 1  1.09                                - Summed rest score: 3                                - Summed difference score:                               0     Stress ejection    Ejection fraction:69 %    EDV :62 ml    ESV :19 ml    Stroke volume :43 ml    LV mass :105 gr  LV size:Normal Normal LV function Medical History   Additional Medical History   CAD, AAA, GERD   Signatures   ------------------------------------------------------------------  Electronically signed by Raffy Dao MD  (Interpreting physician) on 01/23/2023 at 15:02  ------------------------------------------------------------------         Results for orders placed or performed during the hospital encounter of 01/22/23   NM Cardiac Stress Test Nuclear Imaging   Result Value Ref Range    Left Ventricular Ejection Fraction 69     LVEF MODALITY Nuclear    CBC with Auto Differential   Result Value Ref Range    WBC 7.1 4.0 - 11.0 K/uL    RBC 4.33 4.20 - 5.90 M/uL    Hemoglobin 13.6 13.5 - 17.5 g/dL    Hematocrit 40.7 40.5 - 52.5 %    MCV 94.1 80.0 - 100.0 fL    MCH 31.5 26.0 - 34.0 pg    MCHC 33.4 31.0 - 36.0 g/dL    RDW 13.5 12.4 - 15.4 %    Platelets 412 489 - 930 K/uL    MPV 8.2 5.0 - 10.5 fL    Neutrophils % 63.1 % Lymphocytes % 23.1 %    Monocytes % 10.4 %    Eosinophils % 2.2 %    Basophils % 1.2 %    Neutrophils Absolute 4.5 1.7 - 7.7 K/uL    Lymphocytes Absolute 1.6 1.0 - 5.1 K/uL    Monocytes Absolute 0.7 0.0 - 1.3 K/uL    Eosinophils Absolute 0.2 0.0 - 0.6 K/uL    Basophils Absolute 0.1 0.0 - 0.2 K/uL   Basic Metabolic Panel w/ Reflex to MG   Result Value Ref Range    Sodium 136 136 - 145 mmol/L    Potassium reflex Magnesium 3.5 3.5 - 5.1 mmol/L    Chloride 102 99 - 110 mmol/L    CO2 23 21 - 32 mmol/L    Anion Gap 11 3 - 16    Glucose 94 70 - 99 mg/dL    BUN 13 7 - 20 mg/dL    Creatinine 1.2 0.8 - 1.3 mg/dL    Est, Glom Filt Rate >60 >60    Calcium 9.3 8.3 - 10.6 mg/dL   Troponin   Result Value Ref Range    Troponin <0.01 <0.01 ng/mL   Magnesium   Result Value Ref Range    Magnesium 2.30 1.80 - 2.40 mg/dL   Comprehensive Metabolic Panel w/ Reflex to MG   Result Value Ref Range    Sodium 136 136 - 145 mmol/L    Potassium reflex Magnesium 3.8 3.5 - 5.1 mmol/L    Chloride 104 99 - 110 mmol/L    CO2 23 21 - 32 mmol/L    Anion Gap 9 3 - 16    Glucose 100 (H) 70 - 99 mg/dL    BUN 11 7 - 20 mg/dL    Creatinine 1.1 0.8 - 1.3 mg/dL    Est, Glom Filt Rate >60 >60    Calcium 8.5 8.3 - 10.6 mg/dL    Total Protein 5.9 (L) 6.4 - 8.2 g/dL    Albumin 3.8 3.4 - 5.0 g/dL    Albumin/Globulin Ratio 1.8 1.1 - 2.2    Total Bilirubin 0.3 0.0 - 1.0 mg/dL    Alkaline Phosphatase 65 40 - 129 U/L    ALT 46 (H) 10 - 40 U/L    AST 44 (H) 15 - 37 U/L   CBC auto differential   Result Value Ref Range    WBC 6.2 4.0 - 11.0 K/uL    RBC 3.91 (L) 4.20 - 5.90 M/uL    Hemoglobin 12.8 (L) 13.5 - 17.5 g/dL    Hematocrit 36.7 (L) 40.5 - 52.5 %    MCV 94.0 80.0 - 100.0 fL    MCH 32.7 26.0 - 34.0 pg    MCHC 34.7 31.0 - 36.0 g/dL    RDW 13.5 12.4 - 15.4 %    Platelets 353 598 - 828 K/uL    MPV 8.4 5.0 - 10.5 fL    Neutrophils % 58.9 %    Lymphocytes % 26.2 %    Monocytes % 9.7 %    Eosinophils % 3.6 %    Basophils % 1.6 %    Neutrophils Absolute 3.6 1.7 - 7.7 K/uL    Lymphocytes Absolute 1.6 1.0 - 5.1 K/uL    Monocytes Absolute 0.6 0.0 - 1.3 K/uL    Eosinophils Absolute 0.2 0.0 - 0.6 K/uL    Basophils Absolute 0.1 0.0 - 0.2 K/uL   Troponin   Result Value Ref Range    Troponin <0.01 <0.01 ng/mL   Troponin   Result Value Ref Range    Troponin <0.01 <0.01 ng/mL   Comprehensive Metabolic Panel w/ Reflex to MG   Result Value Ref Range    Sodium 138 136 - 145 mmol/L    Potassium reflex Magnesium 4.1 3.5 - 5.1 mmol/L    Chloride 105 99 - 110 mmol/L    CO2 22 21 - 32 mmol/L    Anion Gap 11 3 - 16    Glucose 76 70 - 99 mg/dL    BUN 10 7 - 20 mg/dL    Creatinine 1.0 0.8 - 1.3 mg/dL    Est, Glom Filt Rate >60 >60    Calcium 8.9 8.3 - 10.6 mg/dL    Total Protein 6.1 (L) 6.4 - 8.2 g/dL    Albumin 3.9 3.4 - 5.0 g/dL    Albumin/Globulin Ratio 1.8 1.1 - 2.2    Total Bilirubin 0.5 0.0 - 1.0 mg/dL    Alkaline Phosphatase 64 40 - 129 U/L    ALT 32 10 - 40 U/L    AST 28 15 - 37 U/L   CBC auto differential   Result Value Ref Range    WBC 6.0 4.0 - 11.0 K/uL    RBC 4.19 (L) 4.20 - 5.90 M/uL    Hemoglobin 13.4 (L) 13.5 - 17.5 g/dL    Hematocrit 39.5 (L) 40.5 - 52.5 %    MCV 94.2 80.0 - 100.0 fL    MCH 32.1 26.0 - 34.0 pg    MCHC 34.0 31.0 - 36.0 g/dL    RDW 13.6 12.4 - 15.4 %    Platelets 161 135 - 450 K/uL    MPV 8.6 5.0 - 10.5 fL    Neutrophils % 64.9 %    Lymphocytes % 20.9 %    Monocytes % 8.9 %    Eosinophils % 4.3 %    Basophils % 1.0 %    Neutrophils Absolute 3.9 1.7 - 7.7 K/uL    Lymphocytes Absolute 1.3 1.0 - 5.1 K/uL    Monocytes Absolute 0.5 0.0 - 1.3 K/uL    Eosinophils Absolute 0.3 0.0 - 0.6 K/uL    Basophils Absolute 0.1 0.0 - 0.2 K/uL   EKG 12 Lead   Result Value Ref Range    Ventricular Rate 70 BPM    Atrial Rate 70 BPM    P-R Interval 162 ms    QRS Duration 68 ms    Q-T Interval 402 ms    QTc Calculation (Bazett) 434 ms    P Axis 73 degrees    R Axis 66 degrees    T Axis 33 degrees    Diagnosis       Normal sinus rhythm with sinus arrhythmiaNormal ECGConfirmed by YONAS ADAMS MD  ([id number]) on 1/23/2023 9:58:06 AM   EKG 12 Lead   Result Value Ref Range    Ventricular Rate 81 BPM    Atrial Rate 81 BPM    P-R Interval 156 ms    QRS Duration 70 ms    Q-T Interval 408 ms    QTc Calculation (Bazett) 473 ms    P Axis 84 degrees    R Axis 63 degrees    T Axis 34 degrees    Diagnosis       Sinus rhythm with Premature atrial complexesProlonged QTAbnormal ECGWhen compared with ECG of 14-JUN-2022 06:03,Nonspecific T wave abnormality, improved in Inferior leadsQT has lengthenedConfirmed by Molly CARDENAS ([id number]) on 1/23/2023 1:59:46 PM   EKG 12 Lead   Result Value Ref Range    Ventricular Rate 65 BPM    Atrial Rate 65 BPM    P-R Interval 158 ms    QRS Duration 70 ms    Q-T Interval 414 ms    QTc Calculation (Bazett) 430 ms    P Axis 63 degrees    R Axis 47 degrees    T Axis 31 degrees    Diagnosis       Sinus rhythm with Premature atrial complexes with Aberrant conductionOtherwise normal ECGWhen compared with ECG of 22-JAN-2023 21:54,QT has shortenedConfirmed by [address] ([id number]) on 1/23/2023 3:14:27 PM       Diet:  ADULT DIET;  Regular; Low Fat/Low Chol/High Fiber/2 gm Na    Activity:  Activity as tolerated (Patient may move about with assist as indicated or with supervision.)    Follow-up:  in the next few days with MICHELLE PARKER DO,  in the next few days Cardiology    Disposition: home    Condition: Stable      Time Spent: 25 minutes    Electronically signed by Krista London MD on 1/24/2023 at 2:55 PM    Discharging Hospitalist

## 2023-01-24 NOTE — PLAN OF CARE
Problem: Pain  Goal: Verbalizes/displays adequate comfort level or baseline comfort level  1/24/2023 1210 by Conchita Butts RN  Outcome: Progressing  1/24/2023 0433 by Sulaiman Guillen RN  Outcome: Progressing     Problem: Chronic Conditions and Co-morbidities  Goal: Patient's chronic conditions and co-morbidity symptoms are monitored and maintained or improved  1/24/2023 1210 by Conchita Butts RN  Outcome: Progressing  Flowsheets (Taken 1/24/2023 0930)  Care Plan - Patient's Chronic Conditions and Co-Morbidity Symptoms are Monitored and Maintained or Improved: Monitor and assess patient's chronic conditions and comorbid symptoms for stability, deterioration, or improvement  1/24/2023 0433 by Sulaiman Guillen RN  Outcome: Progressing  [address] (Taken 1/23/2023 2109)  Care Plan - Patient's Chronic Conditions and Co-Morbidity Symptoms are Monitored and Maintained or Improved:   Monitor and assess patient's chronic conditions and comorbid symptoms for stability, deterioration, or improvement   Collaborate with multidisciplinary team to address chronic and comorbid conditions and prevent exacerbation or deterioration   Update acute care plan with appropriate goals if chronic or comorbid symptoms are exacerbated and prevent overall improvement and discharge     Problem: Discharge Planning  Goal: Discharge to home or other facility with appropriate resources  1/24/2023 1210 by Conchita Butts RN  Outcome: Progressing  Flowsheets (Taken 1/24/2023 0930)  Discharge to home or other facility with appropriate resources: Identify barriers to discharge with patient and caregiver  1/24/2023 0433 by Sulaiman Guillen RN  Outcome: Progressing  Flowsheets (Taken 1/23/2023 2109)  Discharge to home or other facility with appropriate resources:   Identify barriers to discharge with patient and caregiver   Identify discharge learning needs (meds, wound care, etc)   Arrange for needed discharge resources and transportation as appropriate

## 2023-01-24 NOTE — PROGRESS NOTES
Removed pt IV and applied a dry dressing without complications. Reviewed discharge instructions and answered all questions. Pt gathered belongings and walked down to [facility].

## 2023-01-24 NOTE — PLAN OF CARE
Problem: Pain  Goal: Verbalizes/displays adequate comfort level or baseline comfort level  Outcome: Progressing     Problem: Chronic Conditions and Co-morbidities  Goal: Patient's chronic conditions and co-morbidity symptoms are monitored and maintained or improved  Outcome: Progressing  Flowsheets (Taken 1/23/2023 2109)  Care Plan - Patient's Chronic Conditions and Co-Morbidity Symptoms are Monitored and Maintained or Improved:   Monitor and assess patient's chronic conditions and comorbid symptoms for stability, deterioration, or improvement   Collaborate with multidisciplinary team to address chronic and comorbid conditions and prevent exacerbation or deterioration   Update acute care plan with appropriate goals if chronic or comorbid symptoms are exacerbated and prevent overall improvement and discharge     Problem: Discharge Planning  Goal: Discharge to home or other facility with appropriate resources  Outcome: Progressing  Flowsheets (Taken 1/23/2023 2109)  Discharge to home or other facility with appropriate resources:   Identify barriers to discharge with patient and caregiver   Identify discharge learning needs (meds, wound care, etc)   Arrange for needed discharge resources and transportation as appropriate     Problem: Pain  Goal: Verbalizes/displays adequate comfort level or baseline comfort level  Outcome: Progressing     Problem: Chronic Conditions and Co-morbidities  Goal: Patient's chronic conditions and co-morbidity symptoms are monitored and maintained or improved  Outcome: Progressing  Flowsheets (Taken 1/23/2023 2109)  Care Plan - Patient's Chronic Conditions and Co-Morbidity Symptoms are Monitored and Maintained or Improved:   Monitor and assess patient's chronic conditions and comorbid symptoms for stability, deterioration, or improvement   Collaborate with multidisciplinary team to address chronic and comorbid conditions and prevent exacerbation or deterioration   Update acute care plan with appropriate goals if chronic or comorbid symptoms are exacerbated and prevent overall improvement and discharge     Problem: Discharge Planning  Goal: Discharge to home or other facility with appropriate resources  Outcome: Progressing  Flowsheets (Taken 1/23/2023 2109)  Discharge to home or other facility with appropriate resources:   Identify barriers to discharge with patient and caregiver   Identify discharge learning needs (meds, wound care, etc)   Arrange for needed discharge resources and transportation as appropriate

## 2023-01-25 ENCOUNTER — OFFICE VISIT (OUTPATIENT)
Dept: FAMILY MEDICINE CLINIC | Age: 63
End: 2023-01-25

## 2023-01-25 ENCOUNTER — CARE COORDINATION (OUTPATIENT)
Dept: CASE MANAGEMENT | Age: 63
End: 2023-01-25

## 2023-01-25 VITALS
HEART RATE: 101 BPM | DIASTOLIC BLOOD PRESSURE: 85 MMHG | HEIGHT: 66 IN | BODY MASS INDEX: 18.26 KG/M2 | SYSTOLIC BLOOD PRESSURE: 117 MMHG | WEIGHT: 113.6 LBS

## 2023-01-25 DIAGNOSIS — F32.4 MAJOR DEPRESSION SINGLE EPISODE, IN PARTIAL REMISSION (HCC): ICD-10-CM

## 2023-01-25 DIAGNOSIS — R07.2 PRECORDIAL CHEST PAIN: Primary | ICD-10-CM

## 2023-01-25 DIAGNOSIS — Z23 NEED FOR INFLUENZA VACCINATION: ICD-10-CM

## 2023-01-25 DIAGNOSIS — K55.1 MESENTERIC ARTERY STENOSIS (HCC): ICD-10-CM

## 2023-01-25 DIAGNOSIS — L91.8 SKIN TAG: ICD-10-CM

## 2023-01-25 DIAGNOSIS — Z09 HOSPITAL DISCHARGE FOLLOW-UP: ICD-10-CM

## 2023-01-25 ASSESSMENT — PATIENT HEALTH QUESTIONNAIRE - PHQ9
SUM OF ALL RESPONSES TO PHQ QUESTIONS 1-9: 7
SUM OF ALL RESPONSES TO PHQ QUESTIONS 1-9: 7
6. FEELING BAD ABOUT YOURSELF - OR THAT YOU ARE A FAILURE OR HAVE LET YOURSELF OR YOUR FAMILY DOWN: 0
SUM OF ALL RESPONSES TO PHQ QUESTIONS 1-9: 7
9. THOUGHTS THAT YOU WOULD BE BETTER OFF DEAD, OR OF HURTING YOURSELF: 0
SUM OF ALL RESPONSES TO PHQ QUESTIONS 1-9: 7
3. TROUBLE FALLING OR STAYING ASLEEP: 3
SUM OF ALL RESPONSES TO PHQ9 QUESTIONS 1 & 2: 2
2. FEELING DOWN, DEPRESSED OR HOPELESS: 2
10. IF YOU CHECKED OFF ANY PROBLEMS, HOW DIFFICULT HAVE THESE PROBLEMS MADE IT FOR YOU TO DO YOUR WORK, TAKE CARE OF THINGS AT HOME, OR GET ALONG WITH OTHER PEOPLE: 1
4. FEELING TIRED OR HAVING LITTLE ENERGY: 2
1. LITTLE INTEREST OR PLEASURE IN DOING THINGS: 0
5. POOR APPETITE OR OVEREATING: 0
7. TROUBLE CONCENTRATING ON THINGS, SUCH AS READING THE NEWSPAPER OR WATCHING TELEVISION: 0
8. MOVING OR SPEAKING SO SLOWLY THAT OTHER PEOPLE COULD HAVE NOTICED. OR THE OPPOSITE, BEING SO FIGETY OR RESTLESS THAT YOU HAVE BEEN MOVING AROUND A LOT MORE THAN USUAL: 0

## 2023-01-25 ASSESSMENT — ENCOUNTER SYMPTOMS
SHORTNESS OF BREATH: 0
NAUSEA: 0

## 2023-01-25 NOTE — PROGRESS NOTES
Subjective:      Patient ID: Benita Mendez is a 57 y.o. male. [facility]    Hospital Follow Up / Chest Pain:  Patient presented by squad to ER on 1-22-23 with a 2 hour history of left chest pain unresponsive to SL nitro x 2 and 81 mg ASA at home. Squad gave another SL nitro and ASA without resolution of pain. He has a history of MI 6-13-22 treated with RCA stent per Dr. Armand Raymundo. He was admitted to the hospital and ruled out for MI. He was seen by Cardiology, who recommended an angiogram as an outpatient. He was discharged to home on 1-24-23 and is to continue taking ASA 81 mg daily, Toprol XL 25 mg daily, Crestor 40 mg daily, Brilinta 90 mg BID. He was started on a Nicotine patch kit 21-14-7 and has SL nitroglycerin as needed. He denies any further chest pain. He has not started using the patches but has not smoked since leaving the hospital.     Depression: Patient is on no antidepressant medication and feels that he is doing fine without it. He denies suicidal ideation. Mesenteric Artery Stenosis:  Patient has his BP and lipids managed by Cardiology. He denies any abdominal pain. Skin Tag:  Patient has a skin tag on the right anterior neck that gets cut when he shaves. He is requesting that I remove it. Review of Systems   Constitutional:  Negative for chills, diaphoresis and fever. Respiratory:  Negative for shortness of breath. Cardiovascular:  Positive for chest pain. Negative for palpitations and leg swelling. Gastrointestinal:  Negative for nausea. /85   Pulse (!) 101   Ht 5' 6\" (1.676 m)   Wt 113 lb 9.6 oz (51.5 kg)   BMI 18.34 kg/m²    Objective:   Physical Exam  Vitals reviewed. Constitutional:       General: He is not in acute distress. Appearance: He is well-developed. HENT:      Head: Normocephalic. Right Ear: External ear normal.      Left Ear: External ear normal.   Neck:      Thyroid: No thyromegaly. Vascular: No carotid bruit or JVD. Cardiovascular:      Rate and Rhythm: Normal rate and regular rhythm. Heart sounds: Normal heart sounds. No murmur heard. Pulmonary:      Effort: Pulmonary effort is normal.      Breath sounds: Normal breath sounds. No wheezing or rales. Lymphadenopathy:      Cervical: No cervical adenopathy. Skin:     Comments: 10 mm gray skin tag on the right anterior neck. No bleeding or sign of infection. Neurological:      Mental Status: He is alert and oriented to person, place, and time. Assessment:      Precordial Chest Pain  Hospital Follow Up  Depression   Mesenteric Artery Stenosis   Skin Tag Neck      Plan:      I reviewed medications with patient. Patient to schedule the angiogram ASAP  Patient placed in supine position. Betadine to clean the area. Xylocaine 1% local.  Sterile forceps and scalpel excision. Silver nitrate applied for hemostasis. Triple antibiotic and a band aid applied. Recommended that patient have an AWV.    RTO as needed         [address] Bhav[address] Swartz,

## 2023-01-25 NOTE — CARE COORDINATION
[facility] Trans[facility] Initial Follow Up Call    Call within 2 business days of discharge: Yes    Patient: Anusha Ferguson Patient : 1960   MRN: [id number]  Reason for Admission:  Chest Pains  Discharge Date: 23 RARS: Readmission Risk Score: 9.9 ( )      Last Discharge  Street       Date Complaint Diagnosis Description Type Department Provider    23 Chest Pain Chest pain, unspecified type ED to Hosp-Admission (Discharged) (ADMITTED) Kasi Aquino MD; [facility]. .. Spoke with: Anusha Ferguson who reported that he doing ok. Patient stated that he finally got a good nights sleep. Patient reported that he has all his medications and taking as ordered. Patient stated that he will call and schedule his [facility] appointment. Patient appreciative of our outreach but decline any further follow up. Facility: [facility]    Non-face-to-face services provided:  Obtained and reviewed discharge summary and/or continuity of care documents  Education of patient/family/caregiver/guardian to support self-management-,  Assessment and support for treatment adherence and medication management-,    Care Transitions 24 Hour Call    Do you have a copy of your discharge instructions?: Yes  Do you have all of your prescriptions and are they filled?: Yes  Have you been contacted by a [address]?: No  Have you scheduled your follow up appointment?: No  Do you feel like you have everything you need to keep you well at home?: Yes  Care Transitions Interventions  No Identified Needs         Follow Up  No future appointments.     Manjeet Haywood LPN

## 2023-01-26 ENCOUNTER — TELEPHONE (OUTPATIENT)
Dept: CARDIOLOGY CLINIC | Age: 63
End: 2023-01-26

## 2023-01-26 NOTE — TELEPHONE ENCOUNTER
Dr Jacqui Perry    Pt was seen at [facility] ED 01/22/2023, consult per Dr Eladio Forte and recommended [facility]    Patient states he spoke with you directly and was told not to proceed with procedure    Please advise?

## 2023-01-27 NOTE — TELEPHONE ENCOUNTER
Okay to hold off on doing the [facility].  He needs a follow-up appointment with me in 4 to 6 weeks please

## 2023-01-27 NOTE — TELEPHONE ENCOUNTER
Spoke with Arnel Carlson. He is agreeable to this plan. I have him scheduled to see Dr. Rabia Knowles 2/23/23 at 1:30 p.m. He v/u.

## 2023-01-27 NOTE — TELEPHONE ENCOUNTER
I am going to close this encounter. Please route back to me once patient sees the doctor on 2/23 if the procedure is still needed.

## 2023-01-31 NOTE — TELEPHONE ENCOUNTER
Spoke with the patient he wasn't happy and wants some clarification on what procedure is being done or wanting to be done. States that while in hospital Tuesday he was made to think they weren't doing this procedure or a procedure at all. I've messaged the doctor and nursing staff to review and let me know how to proceed.

## 2023-02-23 ENCOUNTER — TELEMEDICINE (OUTPATIENT)
Dept: FAMILY MEDICINE CLINIC | Age: 63
End: 2023-02-23

## 2023-02-23 ENCOUNTER — OFFICE VISIT (OUTPATIENT)
Dept: VASCULAR SURGERY | Age: 63
End: 2023-02-23
Payer: MEDICARE

## 2023-02-23 ENCOUNTER — OFFICE VISIT (OUTPATIENT)
Dept: CARDIOLOGY CLINIC | Age: 63
End: 2023-02-23
Payer: MEDICARE

## 2023-02-23 ENCOUNTER — PROCEDURE VISIT (OUTPATIENT)
Dept: SURGERY | Age: 63
End: 2023-02-23
Payer: MEDICARE

## 2023-02-23 VITALS — DIASTOLIC BLOOD PRESSURE: 76 MMHG | SYSTOLIC BLOOD PRESSURE: 112 MMHG | WEIGHT: 122 LBS | BODY MASS INDEX: 19.69 KG/M2

## 2023-02-23 VITALS
BODY MASS INDEX: 19.29 KG/M2 | HEART RATE: 78 BPM | HEIGHT: 66 IN | OXYGEN SATURATION: 98 % | RESPIRATION RATE: 14 BRPM | DIASTOLIC BLOOD PRESSURE: 58 MMHG | WEIGHT: 120 LBS | SYSTOLIC BLOOD PRESSURE: 104 MMHG

## 2023-02-23 DIAGNOSIS — I71.43 INFRARENAL ABDOMINAL AORTIC ANEURYSM (AAA) WITHOUT RUPTURE: Primary | ICD-10-CM

## 2023-02-23 DIAGNOSIS — Z72.0 TOBACCO ABUSE: Primary | ICD-10-CM

## 2023-02-23 DIAGNOSIS — I25.10 CORONARY ARTERY DISEASE INVOLVING NATIVE CORONARY ARTERY OF NATIVE HEART WITHOUT ANGINA PECTORIS: Primary | ICD-10-CM

## 2023-02-23 DIAGNOSIS — R07.89 ATYPICAL CHEST PAIN: ICD-10-CM

## 2023-02-23 DIAGNOSIS — Z72.0 TOBACCO ABUSE: ICD-10-CM

## 2023-02-23 DIAGNOSIS — Z00.00 INITIAL MEDICARE ANNUAL WELLNESS VISIT: Primary | ICD-10-CM

## 2023-02-23 DIAGNOSIS — I71.40 ABDOMINAL AORTIC ANEURYSM (AAA) WITHOUT RUPTURE, UNSPECIFIED PART: ICD-10-CM

## 2023-02-23 DIAGNOSIS — I10 ESSENTIAL HYPERTENSION: ICD-10-CM

## 2023-02-23 DIAGNOSIS — E78.5 HYPERLIPIDEMIA LDL GOAL <70: ICD-10-CM

## 2023-02-23 DIAGNOSIS — I72.3 ILIAC ARTERY ANEURYSM, RIGHT (HCC): ICD-10-CM

## 2023-02-23 DIAGNOSIS — I21.19 ACUTE INFERIOR MYOCARDIAL INFARCTION (HCC): ICD-10-CM

## 2023-02-23 DIAGNOSIS — K55.1 MESENTERIC ARTERY STENOSIS (HCC): ICD-10-CM

## 2023-02-23 PROCEDURE — G8427 DOCREV CUR MEDS BY ELIG CLIN: HCPCS | Performed by: NURSE PRACTITIONER

## 2023-02-23 PROCEDURE — 99214 OFFICE O/P EST MOD 30 MIN: CPT | Performed by: NURSE PRACTITIONER

## 2023-02-23 PROCEDURE — G8427 DOCREV CUR MEDS BY ELIG CLIN: HCPCS | Performed by: INTERNAL MEDICINE

## 2023-02-23 PROCEDURE — 3017F COLORECTAL CA SCREEN DOC REV: CPT | Performed by: NURSE PRACTITIONER

## 2023-02-23 PROCEDURE — 93978 VASCULAR STUDY: CPT | Performed by: STUDENT IN AN ORGANIZED HEALTH CARE EDUCATION/TRAINING PROGRAM

## 2023-02-23 PROCEDURE — G8420 CALC BMI NORM PARAMETERS: HCPCS | Performed by: NURSE PRACTITIONER

## 2023-02-23 PROCEDURE — 1123F ACP DISCUSS/DSCN MKR DOCD: CPT | Performed by: INTERNAL MEDICINE

## 2023-02-23 PROCEDURE — 3074F SYST BP LT 130 MM HG: CPT | Performed by: INTERNAL MEDICINE

## 2023-02-23 PROCEDURE — 3074F SYST BP LT 130 MM HG: CPT | Performed by: NURSE PRACTITIONER

## 2023-02-23 PROCEDURE — G8484 FLU IMMUNIZE NO ADMIN: HCPCS | Performed by: NURSE PRACTITIONER

## 2023-02-23 PROCEDURE — 1123F ACP DISCUSS/DSCN MKR DOCD: CPT | Performed by: NURSE PRACTITIONER

## 2023-02-23 PROCEDURE — 3017F COLORECTAL CA SCREEN DOC REV: CPT | Performed by: INTERNAL MEDICINE

## 2023-02-23 PROCEDURE — 4004F PT TOBACCO SCREEN RCVD TLK: CPT | Performed by: INTERNAL MEDICINE

## 2023-02-23 PROCEDURE — 4004F PT TOBACCO SCREEN RCVD TLK: CPT | Performed by: NURSE PRACTITIONER

## 2023-02-23 PROCEDURE — 3078F DIAST BP <80 MM HG: CPT | Performed by: INTERNAL MEDICINE

## 2023-02-23 PROCEDURE — 3078F DIAST BP <80 MM HG: CPT | Performed by: NURSE PRACTITIONER

## 2023-02-23 PROCEDURE — G8484 FLU IMMUNIZE NO ADMIN: HCPCS | Performed by: INTERNAL MEDICINE

## 2023-02-23 PROCEDURE — G8420 CALC BMI NORM PARAMETERS: HCPCS | Performed by: INTERNAL MEDICINE

## 2023-02-23 PROCEDURE — 99214 OFFICE O/P EST MOD 30 MIN: CPT | Performed by: INTERNAL MEDICINE

## 2023-02-23 ASSESSMENT — PATIENT HEALTH QUESTIONNAIRE - PHQ9
SUM OF ALL RESPONSES TO PHQ QUESTIONS 1-9: 8
4. FEELING TIRED OR HAVING LITTLE ENERGY: 3
SUM OF ALL RESPONSES TO PHQ9 QUESTIONS 1 & 2: 2
SUM OF ALL RESPONSES TO PHQ QUESTIONS 1-9: 8
SUM OF ALL RESPONSES TO PHQ QUESTIONS 1-9: 10
5. POOR APPETITE OR OVEREATING: 0
10. IF YOU CHECKED OFF ANY PROBLEMS, HOW DIFFICULT HAVE THESE PROBLEMS MADE IT FOR YOU TO DO YOUR WORK, TAKE CARE OF THINGS AT HOME, OR GET ALONG WITH OTHER PEOPLE: 1
SUM OF ALL RESPONSES TO PHQ QUESTIONS 1-9: 10
7. TROUBLE CONCENTRATING ON THINGS, SUCH AS READING THE NEWSPAPER OR WATCHING TELEVISION: 0
SUM OF ALL RESPONSES TO PHQ QUESTIONS 1-9: 10
6. FEELING BAD ABOUT YOURSELF - OR THAT YOU ARE A FAILURE OR HAVE LET YOURSELF OR YOUR FAMILY DOWN: 0
4. FEELING TIRED OR HAVING LITTLE ENERGY: 3
8. MOVING OR SPEAKING SO SLOWLY THAT OTHER PEOPLE COULD HAVE NOTICED. OR THE OPPOSITE, BEING SO FIGETY OR RESTLESS THAT YOU HAVE BEEN MOVING AROUND A LOT MORE THAN USUAL: 0
3. TROUBLE FALLING OR STAYING ASLEEP: 2
2. FEELING DOWN, DEPRESSED OR HOPELESS: 3
6. FEELING BAD ABOUT YOURSELF - OR THAT YOU ARE A FAILURE OR HAVE LET YOURSELF OR YOUR FAMILY DOWN: 0
SUM OF ALL RESPONSES TO PHQ QUESTIONS 1-9: 8
SUM OF ALL RESPONSES TO PHQ QUESTIONS 1-9: 10
8. MOVING OR SPEAKING SO SLOWLY THAT OTHER PEOPLE COULD HAVE NOTICED. OR THE OPPOSITE, BEING SO FIGETY OR RESTLESS THAT YOU HAVE BEEN MOVING AROUND A LOT MORE THAN USUAL: 0
2. FEELING DOWN, DEPRESSED OR HOPELESS: 1
9. THOUGHTS THAT YOU WOULD BE BETTER OFF DEAD, OR OF HURTING YOURSELF: 0
1. LITTLE INTEREST OR PLEASURE IN DOING THINGS: 0
7. TROUBLE CONCENTRATING ON THINGS, SUCH AS READING THE NEWSPAPER OR WATCHING TELEVISION: 0
1. LITTLE INTEREST OR PLEASURE IN DOING THINGS: 1
SUM OF ALL RESPONSES TO PHQ QUESTIONS 1-9: 8
SUM OF ALL RESPONSES TO PHQ9 QUESTIONS 1 & 2: 3
3. TROUBLE FALLING OR STAYING ASLEEP: 3
9. THOUGHTS THAT YOU WOULD BE BETTER OFF DEAD, OR OF HURTING YOURSELF: 0
5. POOR APPETITE OR OVEREATING: 2
10. IF YOU CHECKED OFF ANY PROBLEMS, HOW DIFFICULT HAVE THESE PROBLEMS MADE IT FOR YOU TO DO YOUR WORK, TAKE CARE OF THINGS AT HOME, OR GET ALONG WITH OTHER PEOPLE: 1

## 2023-02-23 ASSESSMENT — LIFESTYLE VARIABLES
HOW OFTEN DURING THE LAST YEAR HAVE YOU NEEDED AN ALCOHOLIC DRINK FIRST THING IN THE MORNING TO GET YOURSELF GOING AFTER A NIGHT OF HEAVY DRINKING: 0
HAVE YOU OR SOMEONE ELSE BEEN INJURED AS A RESULT OF YOUR DRINKING: 0
HOW OFTEN DO YOU HAVE A DRINK CONTAINING ALCOHOL: 2-4 TIMES A MONTH
HOW OFTEN DURING THE LAST YEAR HAVE YOU FAILED TO DO WHAT WAS NORMALLY EXPECTED FROM YOU BECAUSE OF DRINKING: 0
HAS A RELATIVE, FRIEND, DOCTOR, OR ANOTHER HEALTH PROFESSIONAL EXPRESSED CONCERN ABOUT YOUR DRINKING OR SUGGESTED YOU CUT DOWN: 0
HOW MANY STANDARD DRINKS CONTAINING ALCOHOL DO YOU HAVE ON A TYPICAL DAY: 3 OR 4
HOW OFTEN DURING THE LAST YEAR HAVE YOU FOUND THAT YOU WERE NOT ABLE TO STOP DRINKING ONCE YOU HAD STARTED: 0
HOW OFTEN DURING THE LAST YEAR HAVE YOU HAD A FEELING OF GUILT OR REMORSE AFTER DRINKING: 0
HOW OFTEN DURING THE LAST YEAR HAVE YOU BEEN UNABLE TO REMEMBER WHAT HAPPENED THE NIGHT BEFORE BECAUSE YOU HAD BEEN DRINKING: 0

## 2023-02-23 ASSESSMENT — ENCOUNTER SYMPTOMS: BACK PAIN: 1

## 2023-02-23 NOTE — LETTER
[facility] - [facility] and Endovascular Surgeons, [address]  [address] [address]  Phone: [phone]  Fax: [phone]    YADI Cat CNP        February 23, 2023      Dang Rice, [address] [place] [address] [id number] [address]     Patient: Dipti Munoz    MR Number: [id number]    YOB: 1960    Date of Visit: 2/23/2023        Dear Dang Rice:    I saw your patient Will Norris, in the office for surveillance of an abdominal aortic aneurysm. His ultrasound showed an increase in the size of his aneurysm (4.54 cm at last evaluation to 4.84 cm today). His right common iliac artery aneurysm increased from 1.64 cm at last evaluation to 2.26 cm today. I have asked the patient to follow up in 6 months for a repeat ultrasound. I will keep you posted regarding this patient's progress.     Sincerely,      Electronically signed by YADI Cat CNP on 2/23/2023 at 2:24 PM    YADI Cat CNP

## 2023-02-23 NOTE — PATIENT INSTRUCTIONS
Learning About Mindfulness for Stress  What are mindfulness and stress? Stress is what you feel when you have to handle more than you are used to. A lot of things can cause stress. You may feel stress when you go on a job interview, take a test, or run a race. This kind of short-term stress is normal and even useful. It can help you if you need to work hard or react quickly. Stress also can last a long time. Long-term stress is caused by stressful situations or events. Examples of long-term stress include long-term health problems, ongoing problems at work, and conflicts in your family. Long-term stress can harm your health. Mindfulness is a focus only on things happening in the present moment. It's a process of purposefully paying attention to and being aware of your surroundings, your emotions, your thoughts, and how your body feels. You are aware of these things, but you aren't judging these experiences as \"good\" or \"bad. \" Mindfulness can help you learn to calm your mind and body to help you cope with illness, pain, and stress. How does mindfulness help to relieve stress? Mindfulness can help quiet your mind and relax your body. Studies show that it can help some people sleep better, feel less anxious, and bring their blood pressure down. And it's been shown to help some people live and cope better with certain health problems like heart disease, depression, chronic pain, and cancer. How do you practice mindfulness? To be mindful is to pay attention, to be present, and to be accepting. When you're mindful, you do just one thing and you pay close attention to that one thing. For example, you may sit quietly and notice your emotions or how your food tastes and smells. When you're present, you focus on the things that are happening right now. You let go of your thoughts about the past and the future. When you dwell on the past or the future, you miss moments that can heal and strengthen you.  You may miss moments like hearing a child laugh or seeing a friendly face when you think you're all alone. When you're accepting, you don't  the present moment. Instead you accept your thoughts and feelings as they come. You can practice anytime, anywhere, and in any way you choose. You can practice in many ways. Here are a few ideas:  While doing your chores, like washing the dishes, let your mind focus on what's in your hand. What does the dish feel like? Is the water warm or cold? Go outside and take a few deep breaths. What is the air like? Is it warm or cold? When you can, take some time at the start of your day to sit alone and think. Take a slow walk by yourself. Count your steps while you breathe in and out. Try yoga breathing exercises, stretches, and poses to strengthen and relax your muscles. At work, if you can, try to stop for a few moments each hour. Note how your body feels. Let yourself regroup and let your mind settle before you return to what you were doing. If you struggle with anxiety or \"worry thoughts,\" imagine your mind as a blue abraham and your worry thoughts as clouds. Now imagine those worry thoughts floating across your mind's abraham. Just let them pass by as you watch. Follow-up care is a key part of your treatment and safety. Be sure to make and go to all appointments, and call your doctor if you are having problems. It's also a good idea to know your test results and keep a list of the medicines you take. Where can you learn more? Go to http://www.garner.com/ and enter M676 to learn more about \"Learning About Mindfulness for Stress. \"  Current as of: February 9, 2022               Content Version: 13.5  © 2006-2022 Healthwise, Incorporated. Care instructions adapted under license by [facility] ([facility]).  If you have questions about a medical condition or this instruction, always ask your healthcare professional. [address] any warranty or liability for your use of this information. Substance Use Disorder: Care Instructions  Overview     You can improve your life and health by stopping your use of alcohol or drugs. When you don't drink or use drugs, you may feel and sleep better. You may get along better with your family, friends, and coworkers. There are medicines and programs that can help with substance use disorder. How can you care for yourself at home? If you have been given medicine to help keep you sober or reduce your cravings, be sure to take it exactly as prescribed. Talk to your doctor about programs that can help you stop using drugs or drinking alcohol. Do not tempt yourself by keeping alcohol or drugs in your home. Learn how to say no when other people drink or use drugs. Or don't spend time with people who drink or use drugs. Use the time and money spent on drinking or drugs to do something fun with your family or friends. Preventing a relapse  Do not drink alcohol or use drugs at all. Using any amount of alcohol or drugs greatly increases your risk for relapse. Seek help from organizations such as Alcoholics Anonymous, Narcotics Anonymous, or treatment facilities if you feel the need to drink alcohol or use drugs again. Remember that recovery is a lifelong process. Stay away from situations, friends, or places that may lead you to drink or use drugs. Have a plan to spot and deal with relapse. Learn to recognize changes in your thinking that lead you to drink or use drugs. These are warning signs. Get help before you start to drink or use drugs again. Get help as soon as you can if you relapse. Some people make a plan with another person that outlines what they want that person to do for them if they relapse. The plan usually includes how to handle the relapse and who to notify in case of relapse. Don't give up. Remember that a relapse does not mean that you have failed.  Use the experience to learn the triggers that lead you to drink or use drugs. Then quit again. Many people have several relapses before they are able to quit for good. Follow-up care is a key part of your treatment and safety. Be sure to make and go to all appointments, and call your doctor if you are having problems. It's also a good idea to know your test results and keep a list of the medicines you take. When should you call for help? Call 911  anytime you think you may need emergency care. For example, call if:    You feel you cannot stop from hurting yourself or someone else. Call your doctor now or seek immediate medical care if:    You have serious withdrawal symptoms, such as confusion, hallucinations, severe trembling, or seizures. Watch closely for changes in your health, and be sure to contact your doctor if:    You have a relapse.     You need more help or support to stop. Where can you learn more? Go to http://www.garner.com/ and enter H573 to learn more about \"Substance Use Disorder: Care Instructions. \"  Current as of: August 2, 2022               Content Version: 13.5  © 5571-4787 Healthwise, Incorporated. Care instructions adapted under license by [facility] ([facility]). If you have questions about a medical condition or this instruction, always ask your healthcare professional. [address] any warranty or liability for your use of this information. Learning About Emotional Support  When do you need emotional support? You might find getting support from others helpful when you have a long-term health problem. Often people feel alone, confused, or scared when coping with an illness. But you aren't alone. Other people are going through the same thing you are and know how you feel. Talking with others about your feelings can help you feel better. Your family and friends can give you support. So can your doctor, a support group, or a Congregational. If you have a support network, you will not feel as alone.  You will learn new ways to deal with your situation, and you may try harder to overcome it. Where you can get support  Family and friends: They can help you cope by giving you comfort and encouragement. Counseling: Professional counseling can help you cope with situations that interfere with your life and cause stress. Counseling can help you understand and deal with your illness. Your doctor: Find a doctor you trust and feel comfortable with. Be open and honest about your fears and concerns. Your doctor can help you get the right medical treatments, including counseling. Spiritual or Zoroastrianism groups: They can provide comfort and may be able to help you find counseling or other social support services. Social groups: They can help you meet new people and get involved in activities you enjoy. Community support groups: In a support group, you can talk to others who have dealt with the same problems or illness as you. You can encourage one another and learn ways to cope with tough emotions. How to find a support group  Ask your doctor, counselor, or other health professional for suggestions. Contact your local Holiness, Episcopal, Latter day, or other Zoroastrianism group. Ask your family and friends. Ask people who have the same health concerns. Go online. Forums and blogs let you read messages from others and leave your own messages. You can exchange stories, vent your frustrations, and ask and answer questions. Contact a city, state, or national group that provides support for your health concerns. Your library or community center may have a list of these groups. Or you can look for information online. Look for a support group that works for you. Ask yourself if you prefer structure and would like a , or if you would like a less formal group. Do you prefer face-to-face meetings? Or do you feel more secure in online chat rooms or forums?   Supportive relationships  A supportive relationship includes emotional support such as love, trust, and understanding, as well as advice and concrete help, such as help managing your time. Reach out to others  Family and friends can help you. Ask them to:  Listen to you and give you encouragement. This can keep you from feeling hopeless or alone. Help with small daily tasks or with bigger problems. A helping hand can keep you from feeling overwhelmed. Help you manage a health problem. For example, ask them to go to doctor visits with you. Your loved ones can offer support by being involved in your medical care. Respect your relationships  A good relationship is also a two-way street. You count on help from others, but they also count on you. Know your friends' limits. You don't have to see or call your friends every day. If you are going through a rough patch, ask friends if you can contact them outside of the usual boundaries. Don't always complain or talk about yourself. Know when it's time to stop talking and listen or just enjoy your friend's company. Know that good friends can be a bad influence. For example, if a friend encourages you to drink when you know it will harm you, you may want to end the friendship. Where can you learn more? Go to http://www.woods.com/ and enter G092 to learn more about \"Learning About Emotional Support. \"  Current as of: February 9, 2022               Content Version: 13.5  © 2458-7605 Healthwise, Incorporated. Care instructions adapted under license by [facility] ([facility]). If you have questions about a medical condition or this instruction, always ask your healthcare professional. [address] any warranty or liability for your use of this information. For more information on your local Area Agency on Aging or [place] on Aging please visit the appropriate web site below:    [facility]: MobileCycles.pl    [facility]: https://aging. [place].gov/    [facility]: https://aging.[place].gov/    [place]: InsuranceSquad.es           Learning About Stress  What is stress? Stress is what you feel when you have to handle more than you are used to. Stress is a fact of life for most people, and it affects everyone differently. What causes stress for you may not be stressful for someone else. A lot of things can cause stress. You may feel stress when you go on a job interview, take a test, or run a race. This kind of short-term stress is normal and even useful. It can help you if you need to work hard or react quickly. For example, stress can help you finish an important job on time. Stress also can last a long time. Long-term stress is caused by stressful situations or events. Examples of long-term stress include long-term health problems, ongoing problems at work, or conflicts in your family. Long-term stress can harm your health. How does stress affect your health? When you are stressed, your body responds as though you are in danger. It makes hormones that speed up your heart, make you breathe faster, and give you a burst of energy. This is called the fight-or-flight stress response. If the stress is over quickly, your body goes back to normal and no harm is done. But if stress happens too often or lasts too long, it can have bad effects. Long-term stress can make you more likely to get sick, and it can make symptoms of some diseases worse. If you tense up when you are stressed, you may develop neck, shoulder, or low back pain. Stress is linked to high blood pressure and heart disease. Stress also harms your emotional health. It can make you polanco, tense, or depressed. Your relationships may suffer, and you may not do well at work or school. What can you do to manage stress? How to relax your mind   Write. It may help to write about things that are bothering you. This helps you find out how much stress you feel and what is causing it.  When you know this, you can find better ways to cope. Let your feelings out. Talk, laugh, cry, and express anger when you need to. Talking with friends, family, a counselor, or a member of the clergy about your feelings is a healthy way to relieve stress. Do something you enjoy. For example, listen to music or go to a movie. Practice your hobby or do volunteer work. Meditate. This can help you relax, because you are not worrying about what happened before or what may happen in the future. Do guided imagery. Imagine yourself in any setting that helps you feel calm. You can use audiotapes, books, or a teacher to guide you. How to relax your body   Do something active. Exercise or activity can help reduce stress. Walking is a great way to get started. Even everyday activities such as housecleaning or yard work can help. Do breathing exercises. For example:  From a standing position, bend forward from the waist with your knees slightly bent. Let your arms dangle close to the floor. Breathe in slowly and deeply as you return to a standing position. Roll up slowly and lift your head last.  Hold your breath for just a few seconds in the standing position. Breathe out slowly and bend forward from the waist.  Try yoga or sara chi. These techniques combine exercise and meditation. You may need some training at first to learn them. What can you do to prevent stress? Manage your time. This helps you find time to do the things you want and need to do. Get enough sleep. Your body recovers from the stresses of the day while you are sleeping. Get support. Your family, friends, and community can make a difference in how you experience stress. Where can you learn more? Go to http://www.garner.com/ and enter N032 to learn more about \"Learning About Stress. \"  Current as of: October 6, 2021               Content Version: 13.5  © 1807-4079 Healthwise, Incorporated. Care instructions adapted under license by [facility] ([facility]). If you have questions about a medical condition or this instruction, always ask your healthcare professional. [address] any warranty or liability for your use of this information. Advance Directives: Care Instructions  Overview  An advance directive is a legal way to state your wishes at the end of your life. It tells your family and your doctor what to do if you can't say what you want. There are two main types of advance directives. You can change them any time your wishes change. Living will. This form tells your family and your doctor your wishes about life support and other treatment. The form is also called a declaration. Medical power of . This form lets you name a person to make treatment decisions for you when you can't speak for yourself. This person is called a health care agent (health care proxy, health care surrogate). The form is also called a durable power of  for health care. If you do not have an advance directive, decisions about your medical care may be made by a family member, or by a doctor or a  who doesn't know you. It may help to think of an advance directive as a gift to the people who care for you. If you have one, they won't have to make tough decisions by themselves. For more information, including forms for your state, see the [address] website (www.caringinfo.org/planning/advance-directives/). Follow-up care is a key part of your treatment and safety. Be sure to make and go to all appointments, and call your doctor if you are having problems. It's also a good idea to know your test results and keep a list of the medicines you take. What should you include in an advance directive? Many states have a unique advance directive form. (It may ask you to address specific issues.) Or you might use a universal form that's approved by many states.   If your form doesn't tell you what to address, it may be hard to know what to include in your advance directive. Use the questions below to help you get started. Who do you want to make decisions about your medical care if you are not able to? What life-support measures do you want if you have a serious illness that gets worse over time or can't be cured? What are you most afraid of that might happen? (Maybe you're afraid of having pain, losing your independence, or being kept alive by machines.)  Where would you prefer to die? (Your home? A hospital? A nursing home?)  Do you want to donate your organs when you die? Do you want certain Orthodoxy practices performed before you die? When should you call for help? Be sure to contact your doctor if you have any questions. Where can you learn more? Go to http://www.garner.com/ and enter R264 to learn more about \"Advance Directives: Care Instructions. \"  Current as of: June 16, 2022               Content Version: 13.5  © 2006-2022 [employer]. Care instructions adapted under license by [address] [place] [address]. If you have questions about a medical condition or this instruction, always ask your healthcare professional. [place] [address] any warranty or liability for your use of this information. A Healthy Heart: Care Instructions  Your Care Instructions     Coronary artery disease, also called heart disease, occurs when a substance called plaque builds up in the vessels that supply oxygen-rich blood to your heart muscle. This can narrow the blood vessels and reduce blood flow. A heart attack happens when blood flow is completely blocked. A high-fat diet, smoking, and other factors increase the risk of heart disease. Your doctor has found that you have a chance of having heart disease. You can do lots of things to keep your heart healthy. It may not be easy, but you can change your diet, exercise more, and quit smoking. These steps really work to lower your chance of heart disease.   Follow-up care is a key part of your treatment and safety. Be sure to make and go to all appointments, and call your doctor if you are having problems. It's also a good idea to know your test results and keep a list of the medicines you take. How can you care for yourself at home? Diet    Use less salt when you cook and eat. This helps lower your blood pressure. Taste food before salting. Add only a little salt when you think you need it. With time, your taste buds will adjust to less salt.     Eat fewer snack items, fast foods, canned soups, and other high-salt, high-fat, processed foods.     Read food labels and try to avoid saturated and trans fats. They increase your risk of heart disease by raising cholesterol levels.     Limit the amount of solid fat-butter, margarine, and shortening-you eat. Use olive, peanut, or canola oil when you cook. Bake, broil, and steam foods instead of frying them.     Eat a variety of fruit and vegetables every day. Dark green, deep orange, red, or yellow fruits and vegetables are especially good for you. Examples include spinach, carrots, peaches, and berries.     Foods high in fiber can reduce your cholesterol and provide important vitamins and minerals. High-fiber foods include whole-grain cereals and breads, oatmeal, beans, brown rice, citrus fruits, and apples.     Eat lean proteins. Heart-healthy proteins include seafood, lean meats and poultry, eggs, beans, peas, nuts, seeds, and soy products.     Limit drinks and foods with added sugar. These include candy, desserts, and soda pop. Lifestyle changes    If your doctor recommends it, get more exercise. Walking is a good choice. Bit by bit, increase the amount you walk every day. Try for at least 30 minutes on most days of the week. You also may want to swim, bike, or do other activities.     Do not smoke. If you need help quitting, talk to your doctor about stop-smoking programs and medicines.  These can increase your chances of quitting for good. Quitting smoking may be the most important step you can take to protect your heart. It is never too late to quit.     Limit alcohol to 2 drinks a day for men and 1 drink a day for women. Too much alcohol can cause health problems.     Manage other health problems such as diabetes, high blood pressure, and high cholesterol. If you think you may have a problem with alcohol or drug use, talk to your doctor. Medicines    Take your medicines exactly as prescribed. Call your doctor if you think you are having a problem with your medicine.     If your doctor recommends aspirin, take the amount directed each day. Make sure you take aspirin and not another kind of pain reliever, such as acetaminophen (Tylenol). When should you call for help? Call 911 if you have symptoms of a heart attack. These may include:    Chest pain or pressure, or a strange feeling in the chest.     Sweating.     Shortness of breath.     Pain, pressure, or a strange feeling in the back, neck, jaw, or upper belly or in one or both shoulders or arms.     Lightheadedness or sudden weakness.     A fast or irregular heartbeat. After you call 911, the  may tell you to chew 1 adult-strength or 2 to 4 low-dose aspirin. Wait for an ambulance. Do not try to drive yourself. Watch closely for changes in your health, and be sure to contact your doctor if you have any problems. Where can you learn more? Go to http://www.garner.com/ and enter F075 to learn more about \"A Healthy Heart: Care Instructions. \"  Current as of: September 7, 2022               Content Version: 13.5  © 2006-2022 Healthwise, Incorporated. Care instructions adapted under license by [facility]. If you have questions about a medical condition or this instruction, always ask your healthcare professional. [place] [address] any warranty or liability for your use of this information.       Personalized Preventive Plan for Jeanie Courser - 2/23/2023  Medicare offers a range of preventive health benefits. Some of the tests and screenings are paid in full while other may be subject to a deductible, co-insurance, and/or copay. Some of these benefits include a comprehensive review of your medical history including lifestyle, illnesses that may run in your family, and various assessments and screenings as appropriate. After reviewing your medical record and screening and assessments performed today your provider may have ordered immunizations, labs, imaging, and/or referrals for you. A list of these orders (if applicable) as well as your Preventive Care list are included within your After Visit Summary for your review. Other Preventive Recommendations:    A preventive eye exam performed by an eye specialist is recommended every 1-2 years to screen for glaucoma; cataracts, macular degeneration, and other eye disorders. A preventive dental visit is recommended every 6 months. Try to get at least 150 minutes of exercise per week or 10,000 steps per day on a pedometer . Order or download the FREE \"Exercise & Physical Activity: Your Everyday Guide\" from [employer]. Call 8-380.777.6165 or search [employer] online. You need 9624-2173 mg of calcium and 0201-4485 IU of vitamin D per day. It is possible to meet your calcium requirement with diet alone, but a vitamin D supplement is usually necessary to meet this goal.  When exposed to the sun, use a sunscreen that protects against both UVA and UVB radiation with an SPF of 30 or greater. Reapply every 2 to 3 hours or after sweating, drying off with a towel, or swimming. Always wear a seat belt when traveling in a car. Always wear a helmet when riding a bicycle or motorcycle.

## 2023-02-23 NOTE — PATIENT INSTRUCTIONS
Schedule to return in six months for an aorta iliac scan and office visit. The patient will need to fast for at least 5 hours prior to the aorta iliac ultrasound scan. Please understand that by not doing so may result in having an inaccurate ultrasound and may cause your ultrasound to be rescheduled.

## 2023-02-23 NOTE — PROGRESS NOTES
3/31/21    Chief Complaint   Patient presents with    6 Month Follow-Up     Aorta iliac scan and office visit       Pain Assessment  Wai Lynch has a pain level on 0/10 scale:  6  Location:  Right shoulder, neck and right hip  Description:  aching  Time:  intermittent    HPI     Susie Villeda is a 70 y.o. male who presents with a complaint of Aneurysm follow up. The Aneurysms are located in the abdominal aorta and right common iliac. Patient was last seen 6/13/22. Previous measurement was 4.54 cm x 4.4 cm per duplex scan at last evaluation. Current measurement is 4.84 cm x 4.84 cm per duplex scan. Right common iliac measures 2.26 cm. SMA stenosis remains stable at 289.7/38.8 cm/s. The patient denies postprandial pain, but he does have pressure in his abdomen most of the time. He stated that he used to be nauseated throughout the day, but that was replaced with this pressure in his epigastric region. His weight remains stable. He reports that his sinuses \"run\" continuously causing him not to wear his dentures (creating problems with eating). Contributing factors of AAA include family history (grandfather had cerebral aneurysm, grandmother also had an aneurysm, not sure of location) and current use of tobacco.  Previous diagnostic tests have included CT, CTA and vascular scan. Recent diagnostic tests include: vascular scan. There has been no relevant prior surgery. Review of Systems   HENT:          Chronic sinus drainage   Gastrointestinal:  Nausea: Hx of nausea X10 yrs. Musculoskeletal:  Positive for arthralgias, back pain (gets injections in upper & lower back) and neck pain. All other systems reviewed and are negative. Allergies   Allergen Reactions    Codeine Nausea Only    Wellbutrin [Bupropion]      Insomnia        Prior to Visit Medications    Medication Sig Taking?  Authorizing Provider   Nicotine 21-14-7 MG/24HR KIT Taper per package instruction Yes Neha Gonsales MD   metoprolol succinate (TOPROL XL) 25 MG extended release tablet Take 1 tablet by mouth daily Yes Burns Brunner, MD   aspirin 81 MG chewable tablet Take 1 tablet by mouth daily Yes Burns Brunner, MD   rosuvastatin (CRESTOR) 40 MG tablet Take 1 tablet by mouth nightly Yes Burns Brunner, MD   ticagrelor (BRILINTA) 90 MG TABS tablet Take 1 tablet by mouth 2 times daily Yes Burns Brunner, MD   nitroGLYCERIN (NITROSTAT) 0.4 MG SL tablet Place 1 tablet under the tongue every 5 minutes as needed for Chest pain Yes Joanna Acuna, APRN - CNP       History reviewed. Physical Exam  Constitutional:       General: He is not in acute distress. Appearance: Normal appearance. HENT:      Head: Normocephalic. Right Ear: Hearing normal.      Left Ear: Hearing normal.   Eyes:      General: Lids are normal.      Conjunctiva/sclera: Conjunctivae normal.   Neck:      Vascular: No carotid bruit. Trachea: Trachea normal. No tracheal deviation. Cardiovascular:      Rate and Rhythm: Normal rate and regular rhythm. Pulses:           Dorsalis pedis pulses are 2+ on the right side and 2+ on the left side. Posterior tibial pulses are detected w/ Doppler on the right side and detected w/ Doppler on the left side. Heart sounds: Normal heart sounds. Comments:   Dopplers 2/23/23  Right DP:  Biphasic  Right PT:  Biphasic    Left DP:  Biphasic  Left PT:  Biphasic  Pulmonary:      Effort: Pulmonary effort is normal.      Breath sounds: Normal breath sounds. Abdominal:      Palpations: There is pulsatile mass (AAA measures 4.84 cm x 4.84 cm per vascular scan today; right common iliac artery measures 2.26 cm). There is no mass. Tenderness: There is no abdominal tenderness. There is no guarding or rebound. Musculoskeletal:         General: Normal range of motion. Cervical back: Normal range of motion and neck supple. Skin:     General: Skin is warm and dry.    Neurological: Mental Status: He is alert and oriented to person, place, and time. Psychiatric:         Judgment: Judgment normal.       ASSESSMENT/PLAN:     Diagnosis   1. Abdominal aortic aneurysm (AAA) without rupture (HCC) - 4.84 cm x 4.84 cm (Increase from 4.54 cm)   2. Iliac artery aneurysm, right (HCC) - 2.26 cm (increase from 1.64 cm)   3. Mesenteric artery stenosis (HCC) - SMA velocity 289.7/38.8 cm/s (no change)   4. Tobacco abuse     PATIENT EDUCATION focused on endovascular AAA repair and plan of care if it enlarges to 5 cm. We also discussed A&P of aneurysms and strong familial incidence. Patient was informed that smoking and high blood pressure can affect aneurysms. Smoking can thin vessels and high blood pressure results in too much pressure inside the vessel, which can potentially stretch it further. I explained that it is important to make family members aware of the influence of genetic factors in the development of an aneurysm. PLAN:      Return in about 6 months (around 8/23/2023) for aorta iliac scan and office visit.

## 2023-02-23 NOTE — PROGRESS NOTES
Onofre Velasco is a 70 y.o. male evaluated via telephone on 2023 for No chief complaint on file. .        Documentation:  I communicated with the patient and/or health care decision maker about (see below). Details of this discussion including any medical advice provided: (see below)    Total Time: minutes: 11-20 minutes    Onofre Velasco was evaluated through a synchronous (real-time) audio encounter. Patient identification was verified at the start of the visit. He (or guardian if applicable) is aware that this is a billable service, which includes applicable co-pays. This visit was conducted with the patient's (and/or legal guardian's) verbal consent. He has not had a related appointment within my department in the past 7 days or scheduled within the next 24 hours. The patient was located at Home: [address] [place] [address]. The provider was located at [facility] (Appt Dept): [address] [place] [address] [place] [address]. Note: not billable if this call serves to triage the patient into an appointment for the relevant concern    Gumaro Adams DO         Medicare Annual Wellness Visit  Name: Ger Vargas Date: 2023   MRN: [id number] Sex: Male   Age: 70 y.o. Ethnicity: Non- / Non    : 1960 Race: White (non-)      Onofre Velasco is here for No chief complaint on file. Screenings for behavioral, psychosocial and functional/safety risks, and cognitive dysfunction are all negative except as indicated below. These results, as well as otherpatient data from the Health Risk Assessment form, are documented in Flowsheets linked to this Encounter. Allergies   Allergen Reactions    Codeine Nausea Only    Wellbutrin [Bupropion]      Insomnia        Prior to Visit Medications    Medication Sig Taking?  Authorizing Provider   Nicotine 21-14-7 MG/24HR KIT Taper per package instruction Yes Vivian Barragan MD   metoprolol succinate (TOPROL XL) 25 MG extended release tablet Take 1 tablet by mouth daily Yes Manuel Brooks MD   aspirin 81 MG chewable tablet Take 1 tablet by mouth daily Yes Manuel Brooks MD   rosuvastatin (CRESTOR) 40 MG tablet Take 1 tablet by mouth nightly Yes Manuel Brooks MD   ticagrelor (BRILINTA) 90 MG TABS tablet Take 1 tablet by mouth 2 times daily Yes Manuel Brooks MD   nitroGLYCERIN (NITROSTAT) 0.4 MG SL tablet Place 1 tablet under the tongue every 5 minutes as needed for Chest pain Yes Joanna Colon Apt, APRN - CNP       Past Medical History:   Diagnosis Date    AAA (abdominal aortic aneurysm) 03/22/2021    Dr. Sandro Alexander    Bilateral hand pain     Chronic left-sided low back pain with left-sided sciatica     Chronic nausea     Chronic seasonal allergic rhinitis due to pollen     DDD (degenerative disc disease), cervical     Essential hypertension     History of acute inferior wall MI 06/13/2022    Dr. Bonny Moeller / Stent in RCA    Major depression single episode, in partial remission ([address]     Mesenteric artery stenosis ([address]     Tobacco abuse      Past Surgical History:   Procedure Laterality Date    BONE MARROW HARVEST  1994    Donor    WISDOM TOOTH EXTRACTION         Family History   Problem Relation Age of Onset    Heart Disease Mother         triple bypass    Heart Surgery Mother         triple by pass 12     Other Father         disappeared in [facility]         Hodgkin's Lymphoma     High Blood Pressure Maternal Uncle        CareTeam (Including outside providers/suppliers regularly involved in providing care):   Patient Care Team:  Lydia Ward DO as PCP - General (Family Medicine)  Lydia Ward DO as PCP - Empaneled Provider  Manuel Brooks MD as Consulting Physician (Internal Medicine Cardiovascular Disease)  Gautam Aguilera DO as Consulting Physician (Vascular Surgery)    Wt Readings from Last 3 Encounters:   01/25/23 113 lb 9.6 oz (51.5 kg) 01/24/23 114 lb 3.2 oz (51.8 kg)   11/23/22 125 lb 9.6 oz (57 kg)     There were no vitals filed for this visit. Physical Exam    Patient's complete Health Risk Assessment andscreening values have been reviewed and are found in Flowsheets. The following problems were reviewed today and where indicated follow up appointments were madeand/or referrals ordered. Positive Risk Factor Screeningswith Interventions:       Depression:  PHQ-2 Score: 3  PHQ-9 Total Score: 8    Interpretation:   1-4 = minimal  5-9 = mild  10-14 = moderate  15-19 = moderately severe  20-27 = severe  Interventions:  I recommended an office visit to address depression. Substance History:  Social History       Tobacco History       Smoking Status  Every Day Smoking Frequency  1 pack/day for 20.00 years (20.00 pk-yrs) Smoking Tobacco Type  Cigarettes      Smokeless Tobacco Use  Never              Alcohol History       Alcohol Use Status  Yes Comment  Social               Drug Use       Drug Use Status  No              Sexual Activity       Sexually Active  Not Currently Partners  Female Comment  wife                   Alcohol Screening:       A score of 8 or more is associated with harmful or hazardous drinking. A score of 13 or more in women, and 15 or more in men, is likely to indicate alcohol dependence. Substance Abuse Interventions:  Tobacco abuse:  Patient encouraged to quit.     General Health and ACP:  General  In general, how would you say your health is?: Fair  In the past 7 days, have you experienced any of the following: New or Increased Pain, New or Increased Fatigue, Loneliness, Social Isolation, Stress or Anger?: (!) Yes  Select all that apply: (!) Loneliness, Stress  Do you get the social and emotional support that you need?: (!) No  Do you have a Living Will?: (!) No (I recommended doing a living will.)    Advance Directives       Power of [facility] Will ACP-Advance Directive ACP-Power of     Not on File Not on File Not on File Not on File        General Health Risk Interventions:  No Living Will: Advance Care Planning addressed with patient today    Weight and Activity:  Physical Activity: Insufficiently Active    Days of Exercise per Week: 2 days    Minutes of Exercise per Session: 40 min     On average, how many days per week do you engage in moderate to strenuous exercise (like a brisk walk)?: 2 days  Have you lost any weight without trying in the past 3 months?: No     Health Habits/Nutrition Interventions:  No needs     Safety:  Do you have either shower bars, grab bars, non-slip mats or non-slip surfaces in your shower or bathtub?: (!) No (I recommended a bath mat)  Interventions:  See above     Personalized Preventive Plan   Current Health Maintenance Status  Immunization History   Administered Date(s) Administered    Influenza Vaccine, unspecified formulation 09/01/2015, 10/10/2016    Influenza Virus Vaccine 09/01/2015, 10/10/2016    Pneumococcal Polysaccharide ([id number]) 09/01/2015    Tdap (Boostrix, Adacel) 10/13/2015, 11/18/2022    Zoster Recombinant (Shingrix) 07/14/2022, 09/12/2022        Health Maintenance   Topic Date Due    HIV screen  Never done    Flu vaccine (1) 08/01/2022    COVID-19 Vaccine (1) 03/03/2023 (Originally 1960)    Lipids  06/15/2023    Low dose CT lung screening  08/22/2023    Depression Monitoring  01/25/2024    Annual Wellness Visit (AWV)  02/24/2024    Colorectal Cancer Screen  01/26/2026    DTaP/Tdap/Td vaccine (3 - Td or Tdap) 11/18/2032    Shingles vaccine  Completed    Pneumococcal 65+ years Vaccine  Completed    Hepatitis C screen  Completed    Hepatitis A vaccine  Aged Out    Hib vaccine  Aged Out    Meningococcal (ACWY) vaccine  Aged Out     Recommendations forPreventive Services Due: see orders.  Recommended screening schedule for the next 5-10 years is provided to the patient inwritten form: see Patient Instructions/AVS.  RTO as needed

## 2023-02-23 NOTE — PROGRESS NOTES
[address]  1960 February 23, 2023    Reason for Consult: CAD/STEMI    CC: \"I'm not too bad\"     HPI:  The patient is 70 y.o. male with a past medical history significant for CAD s/p STEMI 6/2022, hypertension, hyperlipidemia, tobacco abuse, and AAA. He underwent a left heart catheretization 6/14/22 that resulted in x1 IOANA to the RCA. He presented to the ED 1/22/23 for chest pain similar to his prior STEMI. He completed a stress test with no evidence of significant ischemia but did endorse substernal chest pain. It was recommended he complete an angiogram but patient preferred to discharge and complete as an outpatient. Procedure was attempted to be scheduled but patient reported some confusion about the plan and an office visit was made to further discuss. He continues to follow with Dr. Moris Petty for his AAA. Today he presents for hospital follow up and to discuss potentially pursuing a left heart catheretization. He states that overall he is feeling well and notes improvement with his recent ongoing chest pain. He denies any worsening shortness of breath. He reports medication compliance and is tolerating. He reports occasional bilateral leg pain and cramping that occurs at random. He denies any abnormal bleeding or bruising. He denies exertional chest pain/pressure, dyspnea at rest, worsening MILLER, PND, orthopnea, palpitations, lightheadedness, weight changes, changes in LE edema, and syncope. Review of Systems:  Constitutional: No fatigue, weakness, night sweats or fever. HEENT: No new vision difficulties or ringing in the ears. Respiratory: No new SOB, PND, orthopnea or cough. Cardiovascular: See HPI   GI: No n/v, diarrhea, constipation, abdominal pain or changes in bowel habits. No melena, no hematochezia  : No urinary frequency, urgency, incontinence, hematuria or dysuria. Skin: No cyanosis or skin lesions.   Musculoskeletal: No new muscle or joint pain. Neurological: No syncope or TIA-like symptoms.   Psychiatric: No anxiety, insomnia or depression     Past Medical History:   Diagnosis Date    AAA (abdominal aortic aneurysm) 03/22/2021    Dr. Jami Walker    Aneurysm [facility])     Bilateral hand pain     Chronic left-sided low back pain with left-sided sciatica     Chronic nausea     Chronic seasonal allergic rhinitis due to pollen     DDD (degenerative disc disease), cervical     Essential hypertension     History of acute inferior wall MI 06/13/2022    Dr. Essence Hernandez / Stent in RCA    Major depression single episode, in partial remission [facility]     Mesenteric artery stenosis (HCC)     MI (myocardial infarction) ([facility]     Tobacco abuse      Past Surgical History:   Procedure Laterality Date    BONE MARROW HARVEST  1994    Donor    WISDOM TOOTH EXTRACTION       Family History   Problem Relation Age of Onset    Heart Disease Mother         triple bypass    Heart Surgery Mother         triple by pass 12     Other Father         disappeared in 1970    Cancer Brother         Hodgkin's Lymphoma     High Blood Pressure Maternal Uncle      Social History     Tobacco Use    Smoking status: Every Day     Packs/day: 1.00     Years: 20.00     Pack years: 20.00     Types: Cigarettes    Smokeless tobacco: Never   Vaping Use    Vaping Use: Former   Substance Use Topics    Alcohol use: Yes     Comment: Social     Drug use: No       Allergies   Allergen Reactions    Codeine Nausea Only    Wellbutrin [Bupropion]      Insomnia      Current Outpatient Medications   Medication Sig Dispense Refill    metoprolol succinate (TOPROL XL) 25 MG extended release tablet Take 1 tablet by mouth daily 90 tablet 3    aspirin 81 MG chewable tablet Take 1 tablet by mouth daily 90 tablet 3    rosuvastatin (CRESTOR) 40 MG tablet Take 1 tablet by mouth nightly 90 tablet 3    ticagrelor (BRILINTA) 90 MG TABS tablet Take 1 tablet by mouth 2 times daily 180 tablet 3    Nicotine 21-14-7 MG/24HR KIT Taper per package instruction (Patient not taking: Reported on 2/23/2023) 1 kit 0    nitroGLYCERIN (NITROSTAT) 0.4 MG SL tablet Place 1 tablet under the tongue every 5 minutes as needed for Chest pain (Patient not taking: Reported on 2/23/2023) 25 tablet 3     No current facility-administered medications for this visit. Physical Exam:   BP (!) 104/58   Pulse 78   Resp 14   Ht 5' 6\" (1.676 m)   Wt 120 lb (54.4 kg)   SpO2 98%   BMI 19.37 kg/m²   No intake or output data in the 24 hours ending 02/23/23 1406  Wt Readings from Last 2 Encounters:   02/23/23 120 lb (54.4 kg)   02/23/23 122 lb (55.3 kg)     Constitutional: He is oriented to person, place, and time. He appears well-developed and well-nourished. In no acute distress. Head: Normocephalic and atraumatic. Neck: Neck supple. No JVD present. Carotid bruit is not present. No mass and no thyromegaly present. No lymphadenopathy present. Cardiovascular: Normal rate, regular rhythm, normal heart sounds and intact distal pulses. Exam reveals no gallop and no friction rub. No murmur heard. Pulmonary/Chest: Effort normal and breath sounds normal. No respiratory distress. He has no wheezes, rhonchi or rales. Abdominal: Soft, non-tender. Bowel sounds and aorta are normal. He exhibits no organomegaly, mass or bruit. Extremities: No edema, cyanosis, or clubbing. Pulses are 2+ radial/carotid/dorsalis pedis and posterior tibial bilaterally. Neurological: He is alert and oriented to person, place, and time. He has normal reflexes. No cranial nerve deficit. Coordination normal.   Skin: Skin is warm and dry. There is no rash or diaphoresis. Psychiatric: He has a normal mood and affect. His speech is normal and behavior is normal.     Personally reviewed and interpreted   EKG Interpretation 1/22/23: Sinus rhythm with premature atrial complexes with Aberrant conduction. Image Review:     Cath 6/14/22  FINDINGS:  1.   Right-dominant coronary arterial circulation. There was 100%  occlusion of the mid RCA. This was stented with a 3.5 x 34 mm Willie  drug-eluting stent dilated to 3.5 mm in diameter restoring WANDA III flow  with 0% residual stenosis. In the left system, there is no left main  disease. The left anterior descending artery has trivial plaque  disease. The circumflex artery has a proximal to mid 70% lesion that is  eccentric and mildly hazy. There is WANDA III flow in the vessel. 2.  Normal left ventricular systolic function. LV ejection fraction of  55%. 3.  Elevated left ventricular end-diastolic pressure at 20 mmHg. 4.  No gradient across the aortic valve on pullback to suggest aortic  stenosis. CTA Abdomen 6/23/22  1. Fusiform infrarenal abdominal aortic aneurysm with mild enlargement,   currently measuring 4.5 cm. Mural thrombus noted in the aneurysmal segment. 2. Fusiform aneurysm of the proximal and mid right common iliac artery   measuring up to 2 cm.   3. Occluded inferior mesenteric artery similar to prior comparison. No   significant SMA stenosis. 50% narrowing of the celiac artery at the origin. 4. 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 Stress Test 7/1/22  1. Technically a satisfactory study. 2. No evidence of Ischemia by Myocardial Perfusion Imaging. 3. Gated Study shows normal LV size and Systolic function; EF is 40%. Stress Test 1/22/23  Normal myocardial perfusion study. Normal LV size and systolic function. Overall findings represent a low risk study.      Lab Review:   Lab Results   Component Value Date/Time    TRIG 92 06/15/2022 04:40 AM    HDL 34 06/15/2022 04:40 AM    LDLCALC 100 06/15/2022 04:40 AM    LABVLDL 18 06/15/2022 04:40 AM     Lab Results   Component Value Date/Time     01/24/2023 06:40 AM    K 4.1 01/24/2023 06:40 AM    BUN 10 01/24/2023 06:40 AM    CREATININE 1.0 01/24/2023 06:40 AM     No results for input(s): WBC, HGB, HCT, PLT in the last 72 hours. Assessment:  1. CAD s/p STEMI  2. Hyperlipidemia with goal <70  3. AAA. 4.5 cm (6/2022)  4. Essential hypertension  5. Tobacco abuse   6. Bilateral leg pain/cramping   7. Atypical chest pain     Plan:  I think that Joaquina Chahal appears well from a cardiovascular standpoint. I believe his recent chest pain is likely noncardiac in nature and that there is no need to pursue a left heart catheretization at this time. I encouraged him to call the office with any worsening symptoms or exertional chest pain. I will defer further management of his bilateral leg pain and cramping to Dr. Vinnie Reynolds. His blood pressure is well controlled and his most recent lipid profile was favorable on his current statin therapy. I have encouraged him to increase his aerobic activity as tolerated and adhere to a heart healthy diet. I have personally reviewed all previous testing for this visit today including imaging, lab results and EKG as detailed above. I will see him in office for follow up in 6 months. This note was scribed in the presence of Dr Kunal Feliz MD by Geovany Lopez RN. Physician Attestation:  The scribes documentation has been prepared under my direction and personally reviewed by me in its entirety. I, Dr. Kunal Feliz personally performed the services described in this documentation as scribed by my RN in my presence, and I confirm that the note above accurately reflects all work, treatment, procedures, and medical decision making performed by me.

## 2023-03-13 PROBLEM — I73.9 PERIPHERAL ARTERIAL DISEASE (HCC): Status: ACTIVE | Noted: 2023-03-13

## 2023-04-20 ENCOUNTER — HOSPITAL ENCOUNTER (INPATIENT)
Age: 63
LOS: 1 days | Discharge: HOME | DRG: 313 | End: 2023-04-21
Attending: STUDENT IN AN ORGANIZED HEALTH CARE EDUCATION/TRAINING PROGRAM | Admitting: STUDENT IN AN ORGANIZED HEALTH CARE EDUCATION/TRAINING PROGRAM
Payer: MEDICARE

## 2023-04-20 ENCOUNTER — APPOINTMENT (OUTPATIENT)
Dept: CT IMAGING | Age: 63
DRG: 313 | End: 2023-04-20
Payer: MEDICARE

## 2023-04-20 ENCOUNTER — APPOINTMENT (OUTPATIENT)
Dept: GENERAL RADIOLOGY | Age: 63
DRG: 313 | End: 2023-04-20
Payer: MEDICARE

## 2023-04-20 DIAGNOSIS — I25.10 CORONARY ARTERY DISEASE INVOLVING NATIVE CORONARY ARTERY OF NATIVE HEART, UNSPECIFIED WHETHER ANGINA PRESENT: ICD-10-CM

## 2023-04-20 DIAGNOSIS — R07.9 CHEST PAIN, UNSPECIFIED TYPE: Primary | ICD-10-CM

## 2023-04-20 PROBLEM — I20.0 UNSTABLE ANGINA (HCC): Status: ACTIVE | Noted: 2023-04-20

## 2023-04-20 LAB
ALBUMIN SERPL-MCNC: 4.5 G/DL (ref 3.4–5)
ALBUMIN/GLOB SERPL: 2 {RATIO} (ref 1.1–2.2)
ALP SERPL-CCNC: 80 U/L (ref 40–129)
ALT SERPL-CCNC: 56 U/L (ref 10–40)
ANION GAP SERPL CALCULATED.3IONS-SCNC: 10 MMOL/L (ref 3–16)
AST SERPL-CCNC: 45 U/L (ref 15–37)
BASOPHILS # BLD: 0.1 K/UL (ref 0–0.2)
BASOPHILS NFR BLD: 1.2 %
BILIRUB SERPL-MCNC: 0.4 MG/DL (ref 0–1)
BUN SERPL-MCNC: 9 MG/DL (ref 7–20)
CALCIUM SERPL-MCNC: 8.9 MG/DL (ref 8.3–10.6)
CHLORIDE SERPL-SCNC: 102 MMOL/L (ref 99–110)
CO2 SERPL-SCNC: 23 MMOL/L (ref 21–32)
CREAT SERPL-MCNC: 1.1 MG/DL (ref 0.8–1.3)
DEPRECATED RDW RBC AUTO: 13.9 % (ref 12.4–15.4)
EOSINOPHIL # BLD: 0.3 K/UL (ref 0–0.6)
EOSINOPHIL NFR BLD: 4 %
GFR SERPLBLD CREATININE-BSD FMLA CKD-EPI: >60 ML/MIN/{1.73_M2}
GLUCOSE SERPL-MCNC: 99 MG/DL (ref 70–99)
HCT VFR BLD AUTO: 38.2 % (ref 40.5–52.5)
HGB BLD-MCNC: 13.5 G/DL (ref 13.5–17.5)
LYMPHOCYTES # BLD: 1.6 K/UL (ref 1–5.1)
LYMPHOCYTES NFR BLD: 25.1 %
MCH RBC QN AUTO: 33.1 PG (ref 26–34)
MCHC RBC AUTO-ENTMCNC: 35.3 G/DL (ref 31–36)
MCV RBC AUTO: 93.7 FL (ref 80–100)
MONOCYTES # BLD: 0.5 K/UL (ref 0–1.3)
MONOCYTES NFR BLD: 8.6 %
NEUTROPHILS # BLD: 3.9 K/UL (ref 1.7–7.7)
NEUTROPHILS NFR BLD: 61.1 %
PLATELET # BLD AUTO: 167 K/UL (ref 135–450)
PMV BLD AUTO: 8.1 FL (ref 5–10.5)
POTASSIUM SERPL-SCNC: 3.8 MMOL/L (ref 3.5–5.1)
PROT SERPL-MCNC: 6.8 G/DL (ref 6.4–8.2)
RBC # BLD AUTO: 4.08 M/UL (ref 4.2–5.9)
SODIUM SERPL-SCNC: 135 MMOL/L (ref 136–145)
TROPONIN, HIGH SENSITIVITY: 10 NG/L (ref 0–22)
TROPONIN, HIGH SENSITIVITY: 11 NG/L (ref 0–22)
WBC # BLD AUTO: 6.4 K/UL (ref 4–11)

## 2023-04-20 PROCEDURE — 6360000002 HC RX W HCPCS: Performed by: STUDENT IN AN ORGANIZED HEALTH CARE EDUCATION/TRAINING PROGRAM

## 2023-04-20 PROCEDURE — 6370000000 HC RX 637 (ALT 250 FOR IP): Performed by: STUDENT IN AN ORGANIZED HEALTH CARE EDUCATION/TRAINING PROGRAM

## 2023-04-20 PROCEDURE — 84484 ASSAY OF TROPONIN QUANT: CPT

## 2023-04-20 PROCEDURE — 80053 COMPREHEN METABOLIC PANEL: CPT

## 2023-04-20 PROCEDURE — 6360000002 HC RX W HCPCS: Performed by: PHYSICIAN ASSISTANT

## 2023-04-20 PROCEDURE — 85025 COMPLETE CBC W/AUTO DIFF WBC: CPT

## 2023-04-20 PROCEDURE — 6360000004 HC RX CONTRAST MEDICATION: Performed by: STUDENT IN AN ORGANIZED HEALTH CARE EDUCATION/TRAINING PROGRAM

## 2023-04-20 PROCEDURE — 74174 CTA ABD&PLVS W/CONTRAST: CPT

## 2023-04-20 PROCEDURE — 71046 X-RAY EXAM CHEST 2 VIEWS: CPT

## 2023-04-20 PROCEDURE — 6370000000 HC RX 637 (ALT 250 FOR IP): Performed by: PHYSICIAN ASSISTANT

## 2023-04-20 PROCEDURE — 99285 EMERGENCY DEPT VISIT HI MDM: CPT

## 2023-04-20 PROCEDURE — 1200000000 HC SEMI PRIVATE

## 2023-04-20 PROCEDURE — 96374 THER/PROPH/DIAG INJ IV PUSH: CPT

## 2023-04-20 PROCEDURE — 96375 TX/PRO/DX INJ NEW DRUG ADDON: CPT

## 2023-04-20 PROCEDURE — 93005 ELECTROCARDIOGRAM TRACING: CPT | Performed by: STUDENT IN AN ORGANIZED HEALTH CARE EDUCATION/TRAINING PROGRAM

## 2023-04-20 PROCEDURE — 2580000003 HC RX 258: Performed by: STUDENT IN AN ORGANIZED HEALTH CARE EDUCATION/TRAINING PROGRAM

## 2023-04-20 RX ORDER — ASPIRIN 81 MG/1
324 TABLET, CHEWABLE ORAL ONCE
Status: COMPLETED | OUTPATIENT
Start: 2023-04-20 | End: 2023-04-20

## 2023-04-20 RX ORDER — ONDANSETRON 2 MG/ML
4 INJECTION INTRAMUSCULAR; INTRAVENOUS ONCE
Status: COMPLETED | OUTPATIENT
Start: 2023-04-20 | End: 2023-04-20

## 2023-04-20 RX ORDER — ACETAMINOPHEN 650 MG/1
650 SUPPOSITORY RECTAL EVERY 6 HOURS PRN
Status: DISCONTINUED | OUTPATIENT
Start: 2023-04-20 | End: 2023-04-21 | Stop reason: HOSPADM

## 2023-04-20 RX ORDER — SODIUM CHLORIDE 9 MG/ML
INJECTION, SOLUTION INTRAVENOUS PRN
Status: DISCONTINUED | OUTPATIENT
Start: 2023-04-20 | End: 2023-04-21 | Stop reason: HOSPADM

## 2023-04-20 RX ORDER — ASPIRIN 81 MG/1
81 TABLET, CHEWABLE ORAL DAILY
Status: DISCONTINUED | OUTPATIENT
Start: 2023-04-21 | End: 2023-04-21 | Stop reason: HOSPADM

## 2023-04-20 RX ORDER — ONDANSETRON 2 MG/ML
4 INJECTION INTRAMUSCULAR; INTRAVENOUS EVERY 6 HOURS PRN
Status: DISCONTINUED | OUTPATIENT
Start: 2023-04-20 | End: 2023-04-21 | Stop reason: HOSPADM

## 2023-04-20 RX ORDER — ACETAMINOPHEN 325 MG/1
650 TABLET ORAL EVERY 6 HOURS PRN
Status: DISCONTINUED | OUTPATIENT
Start: 2023-04-20 | End: 2023-04-21 | Stop reason: HOSPADM

## 2023-04-20 RX ORDER — SODIUM CHLORIDE 0.9 % (FLUSH) 0.9 %
5-40 SYRINGE (ML) INJECTION PRN
Status: DISCONTINUED | OUTPATIENT
Start: 2023-04-20 | End: 2023-04-21 | Stop reason: HOSPADM

## 2023-04-20 RX ORDER — ROSUVASTATIN CALCIUM 40 MG/1
40 TABLET, COATED ORAL NIGHTLY
Status: DISCONTINUED | OUTPATIENT
Start: 2023-04-20 | End: 2023-04-21 | Stop reason: HOSPADM

## 2023-04-20 RX ORDER — POLYETHYLENE GLYCOL 3350 17 G/17G
17 POWDER, FOR SOLUTION ORAL DAILY PRN
Status: DISCONTINUED | OUTPATIENT
Start: 2023-04-20 | End: 2023-04-21 | Stop reason: HOSPADM

## 2023-04-20 RX ORDER — NITROGLYCERIN 0.4 MG/1
0.4 TABLET SUBLINGUAL EVERY 5 MIN PRN
Status: DISCONTINUED | OUTPATIENT
Start: 2023-04-20 | End: 2023-04-21 | Stop reason: HOSPADM

## 2023-04-20 RX ORDER — MORPHINE SULFATE 4 MG/ML
4 INJECTION, SOLUTION INTRAMUSCULAR; INTRAVENOUS ONCE
Status: COMPLETED | OUTPATIENT
Start: 2023-04-20 | End: 2023-04-20

## 2023-04-20 RX ORDER — METOPROLOL SUCCINATE 25 MG/1
25 TABLET, EXTENDED RELEASE ORAL DAILY
Status: DISCONTINUED | OUTPATIENT
Start: 2023-04-21 | End: 2023-04-21 | Stop reason: HOSPADM

## 2023-04-20 RX ORDER — SODIUM CHLORIDE 0.9 % (FLUSH) 0.9 %
5-40 SYRINGE (ML) INJECTION EVERY 12 HOURS SCHEDULED
Status: DISCONTINUED | OUTPATIENT
Start: 2023-04-20 | End: 2023-04-21 | Stop reason: HOSPADM

## 2023-04-20 RX ORDER — ENOXAPARIN SODIUM 100 MG/ML
40 INJECTION SUBCUTANEOUS DAILY
Status: DISCONTINUED | OUTPATIENT
Start: 2023-04-21 | End: 2023-04-21 | Stop reason: HOSPADM

## 2023-04-20 RX ADMIN — NITROGLYCERIN 0.5 INCH: 20 OINTMENT TOPICAL at 20:44

## 2023-04-20 RX ADMIN — MORPHINE SULFATE 4 MG: 4 INJECTION, SOLUTION INTRAMUSCULAR; INTRAVENOUS at 18:15

## 2023-04-20 RX ADMIN — ASPIRIN 81 MG 324 MG: 81 TABLET ORAL at 18:15

## 2023-04-20 RX ADMIN — IOPAMIDOL 75 ML: 755 INJECTION, SOLUTION INTRAVENOUS at 21:54

## 2023-04-20 RX ADMIN — ROSUVASTATIN 40 MG: 40 TABLET, FILM COATED ORAL at 22:54

## 2023-04-20 RX ADMIN — ONDANSETRON 4 MG: 2 INJECTION INTRAMUSCULAR; INTRAVENOUS at 18:15

## 2023-04-20 RX ADMIN — HYDROMORPHONE HYDROCHLORIDE 0.5 MG: 1 INJECTION, SOLUTION INTRAMUSCULAR; INTRAVENOUS; SUBCUTANEOUS at 22:04

## 2023-04-20 RX ADMIN — Medication 10 ML: at 22:58

## 2023-04-20 RX ADMIN — TICAGRELOR 90 MG: 90 TABLET ORAL at 22:54

## 2023-04-20 ASSESSMENT — PAIN DESCRIPTION - LOCATION
LOCATION: CHEST
LOCATION: CHEST;BACK;SHOULDER

## 2023-04-20 ASSESSMENT — PAIN SCALES - GENERAL
PAINLEVEL_OUTOF10: 9
PAINLEVEL_OUTOF10: 7
PAINLEVEL_OUTOF10: 8
PAINLEVEL_OUTOF10: 4

## 2023-04-20 ASSESSMENT — PAIN DESCRIPTION - DESCRIPTORS: DESCRIPTORS: SHARP

## 2023-04-20 ASSESSMENT — PAIN - FUNCTIONAL ASSESSMENT: PAIN_FUNCTIONAL_ASSESSMENT: 0-10

## 2023-04-20 ASSESSMENT — HEART SCORE: ECG: 0

## 2023-04-20 NOTE — ED NOTES
Chief Complaint   Patient presents with    Chest Pain     Patient reports chest pain onset noon, worse with deep breath. States the pain is sharp.            Rosita Avila RN  04/20/23 0508

## 2023-04-20 NOTE — ED PROVIDER NOTES
I was available for consultation during patient's ED stay. Patient was cared for by CRISTOPHER. I did not evaluate or participate in patient care. EKG Interpretation    Interpreted by emergency department physician    Rhythm: normal sinus   Rate: normal  Axis: normal  Ectopy: none  Conduction: normal  ST Segments: normal  T Waves: normal  Q Waves: none    Clinical Impression: Normal sinus rhythm, no significant T wave or ST changes. Normal SD interval, normal QRS duration, normal QT QTC. Normal axis. No arrhythmia or signs of ischemia. Otherwise normal EKG. Interpreted by myself.           MD Miguelina Forde MD  04/20/23 5923
DIAGNOSTIC RESULTS   LABS:    Labs Reviewed   CBC WITH AUTO DIFFERENTIAL - Abnormal; Notable for the following components:       Result Value    RBC 4.08 (*)     Hematocrit 38.2 (*)     All other components within normal limits   COMPREHENSIVE METABOLIC PANEL W/ REFLEX TO MG FOR LOW K - Abnormal; Notable for the following components:    Sodium 135 (*)     ALT 56 (*)     AST 45 (*)     All other components within normal limits   TROPONIN   TROPONIN       When ordered only abnormal lab results are displayed. All other labs were within normal range or not returned as of this dictation. EKG: When ordered, EKG's are interpreted by the Emergency Department Physician in the absence of a cardiologist.  Please see their note for interpretation of EKG. RADIOLOGY:   Non-plain film images such as CT, Ultrasound and MRI are read by the radiologist. Plain radiographic images are visualized and preliminarily interpreted by the ED Provider with the below findings:    2 view chest x-ray negative for acute infiltrate or effusion per myself in absence of radiologist.    Interpretation per the Radiologist below, if available at the time of this note:    XR CHEST (2 VW)   Final Result   No acute process. XR CHEST (2 VW)    Result Date: 4/20/2023  EXAMINATION: TWO XRAY VIEWS OF THE CHEST 4/20/2023 5:24 pm COMPARISON: January 22, 2023 HISTORY: ORDERING SYSTEM PROVIDED HISTORY: Chest pain TECHNOLOGIST PROVIDED HISTORY: Reason for exam:->Chest pain Reason for Exam: pain, left side FINDINGS: The lungs are without acute focal process. There is no effusion or pneumothorax. The cardiomediastinal silhouette is without acute process. The osseous structures are without acute process. No acute process. No results found.     PROCEDURES   Unless otherwise noted below, none     Procedures    CRITICAL CARE TIME (.cctime)       PAST MEDICAL HISTORY      has a past medical history of AAA (abdominal aortic aneurysm) ([facility] [address]

## 2023-04-20 NOTE — H&P
in the HPI. All other systems reviewed and negative. PHYSICAL EXAM:    /69   Pulse 67   Temp 98.2 °F (36.8 °C) (Oral)   Resp 16   Wt 131 lb 2.8 oz (59.5 kg)   SpO2 94%   BMI 21.17 kg/m²     General appearance: Currently no acute distress, alert and oriented x4, conversational  HEENT Normal cephalic, atraumatic without obvious deformity. Pupils equal, round, and reactive to light. Extra ocular muscles intact. Mildly dry mucous membranes, anicteric sclera  Neck: Supple, no JVD  Lungs: Clear breath sounds bilaterally  Heart: Regular rate rhythm, no murmurs detected  Abdomen: Soft, nontender, nondistended, active bowel sounds  Extremities: No edema  Skin: No rashes  Neurologic: Grossly intact neurologically  Mental status: Alert, oriented, thought content appropriate. Capillary Refill: Acceptable  < 3 seconds  Peripheral Pulses: +3 Easily felt, not easily obliterated with pressure    CTA chest abdomen pelvis IV contrast:  1. Fusiform infrarenal abdominal aortic aneurysm measuring 5.1 x 5.0 cm in   size, increasing and previously measuring 4.6 x 4.4 cm on 06/23/2022. Recommend follow-up in 6 months and vascular consultation. 2. Right common iliac artery bilobed appearing dilation, proximally   aneurysmal measuring 2.2 cm and distally ectatic measuring 1.8 cm. 3. No acute thoracic or abdominal aortic dissection. 4. No central pulmonary emboli. 5. No acute process seen within the lungs other than minimal dependent change. 6. No acute inflammatory process seen within the abdomen or pelvis. Minimal   nonspecific free fluid in the pelvis of uncertain significance. RECOMMENDATIONS:   5.1 cm infrarenal abdominal aortic aneurysm. Recommend follow-up every 6   months and vascular consultation.          CBC   Recent Labs     04/20/23  1730 04/21/23  0328   WBC 6.4 4.8   HGB 13.5 12.4*   HCT 38.2* 35.7*    142      RENAL  Recent Labs     04/20/23  1730   *   K 3.8      CO2 23

## 2023-04-21 VITALS
TEMPERATURE: 98.2 F | OXYGEN SATURATION: 96 % | RESPIRATION RATE: 16 BRPM | WEIGHT: 131.17 LBS | BODY MASS INDEX: 21.17 KG/M2 | DIASTOLIC BLOOD PRESSURE: 90 MMHG | HEART RATE: 64 BPM | SYSTOLIC BLOOD PRESSURE: 170 MMHG

## 2023-04-21 PROBLEM — E78.5 HYPERLIPIDEMIA LDL GOAL <70: Status: ACTIVE | Noted: 2023-04-21

## 2023-04-21 PROBLEM — R07.89 ATYPICAL CHEST PAIN: Status: ACTIVE | Noted: 2023-01-22

## 2023-04-21 LAB
ALBUMIN SERPL-MCNC: 2.7 G/DL (ref 3.4–5)
ANION GAP SERPL CALCULATED.3IONS-SCNC: 7 MMOL/L (ref 3–16)
BASOPHILS # BLD: 0.1 K/UL (ref 0–0.2)
BASOPHILS NFR BLD: 1.2 %
BUN SERPL-MCNC: 9 MG/DL (ref 7–20)
CALCIUM SERPL-MCNC: 6 MG/DL (ref 8.3–10.6)
CHLORIDE SERPL-SCNC: 117 MMOL/L (ref 99–110)
CO2 SERPL-SCNC: 18 MMOL/L (ref 21–32)
CREAT SERPL-MCNC: 0.8 MG/DL (ref 0.8–1.3)
DEPRECATED RDW RBC AUTO: 13.8 % (ref 12.4–15.4)
EKG ATRIAL RATE: 68 BPM
EKG ATRIAL RATE: 68 BPM
EKG DIAGNOSIS: NORMAL
EKG DIAGNOSIS: NORMAL
EKG P AXIS: 71 DEGREES
EKG P AXIS: 79 DEGREES
EKG P-R INTERVAL: 164 MS
EKG P-R INTERVAL: 166 MS
EKG Q-T INTERVAL: 392 MS
EKG Q-T INTERVAL: 402 MS
EKG QRS DURATION: 70 MS
EKG QRS DURATION: 70 MS
EKG QTC CALCULATION (BAZETT): 416 MS
EKG QTC CALCULATION (BAZETT): 427 MS
EKG R AXIS: 72 DEGREES
EKG R AXIS: 77 DEGREES
EKG T AXIS: 29 DEGREES
EKG T AXIS: 45 DEGREES
EKG VENTRICULAR RATE: 68 BPM
EKG VENTRICULAR RATE: 68 BPM
EOSINOPHIL # BLD: 0.3 K/UL (ref 0–0.6)
EOSINOPHIL NFR BLD: 5.3 %
GFR SERPLBLD CREATININE-BSD FMLA CKD-EPI: >60 ML/MIN/{1.73_M2}
GLUCOSE SERPL-MCNC: 72 MG/DL (ref 70–99)
HCT VFR BLD AUTO: 35.7 % (ref 40.5–52.5)
HGB BLD-MCNC: 12.4 G/DL (ref 13.5–17.5)
LYMPHOCYTES # BLD: 1.3 K/UL (ref 1–5.1)
LYMPHOCYTES NFR BLD: 28 %
MAGNESIUM SERPL-MCNC: 1.7 MG/DL (ref 1.8–2.4)
MCH RBC QN AUTO: 32.8 PG (ref 26–34)
MCHC RBC AUTO-ENTMCNC: 34.6 G/DL (ref 31–36)
MCV RBC AUTO: 94.7 FL (ref 80–100)
MONOCYTES # BLD: 0.4 K/UL (ref 0–1.3)
MONOCYTES NFR BLD: 8.6 %
NEUTROPHILS # BLD: 2.7 K/UL (ref 1.7–7.7)
NEUTROPHILS NFR BLD: 56.9 %
PHOSPHATE SERPL-MCNC: 2.9 MG/DL (ref 2.5–4.9)
PLATELET # BLD AUTO: 142 K/UL (ref 135–450)
PMV BLD AUTO: 8 FL (ref 5–10.5)
POTASSIUM SERPL-SCNC: 3.3 MMOL/L (ref 3.5–5.1)
RBC # BLD AUTO: 3.77 M/UL (ref 4.2–5.9)
SODIUM SERPL-SCNC: 142 MMOL/L (ref 136–145)
TROPONIN, HIGH SENSITIVITY: 10 NG/L (ref 0–22)
TROPONIN, HIGH SENSITIVITY: 11 NG/L (ref 0–22)
TROPONIN, HIGH SENSITIVITY: 9 NG/L (ref 0–22)
WBC # BLD AUTO: 4.8 K/UL (ref 4–11)

## 2023-04-21 PROCEDURE — 94760 N-INVAS EAR/PLS OXIMETRY 1: CPT

## 2023-04-21 PROCEDURE — 80069 RENAL FUNCTION PANEL: CPT

## 2023-04-21 PROCEDURE — 83735 ASSAY OF MAGNESIUM: CPT

## 2023-04-21 PROCEDURE — 6370000000 HC RX 637 (ALT 250 FOR IP): Performed by: STUDENT IN AN ORGANIZED HEALTH CARE EDUCATION/TRAINING PROGRAM

## 2023-04-21 PROCEDURE — 84484 ASSAY OF TROPONIN QUANT: CPT

## 2023-04-21 PROCEDURE — 6360000002 HC RX W HCPCS: Performed by: STUDENT IN AN ORGANIZED HEALTH CARE EDUCATION/TRAINING PROGRAM

## 2023-04-21 PROCEDURE — APPNB30 APP NON BILLABLE TIME 0-30 MINS: Performed by: NURSE PRACTITIONER

## 2023-04-21 PROCEDURE — 99223 1ST HOSP IP/OBS HIGH 75: CPT | Performed by: INTERNAL MEDICINE

## 2023-04-21 PROCEDURE — 93005 ELECTROCARDIOGRAM TRACING: CPT | Performed by: STUDENT IN AN ORGANIZED HEALTH CARE EDUCATION/TRAINING PROGRAM

## 2023-04-21 PROCEDURE — 85025 COMPLETE CBC W/AUTO DIFF WBC: CPT

## 2023-04-21 PROCEDURE — 2580000003 HC RX 258: Performed by: STUDENT IN AN ORGANIZED HEALTH CARE EDUCATION/TRAINING PROGRAM

## 2023-04-21 PROCEDURE — 93010 ELECTROCARDIOGRAM REPORT: CPT | Performed by: INTERNAL MEDICINE

## 2023-04-21 PROCEDURE — 36415 COLL VENOUS BLD VENIPUNCTURE: CPT

## 2023-04-21 RX ORDER — 0.9 % SODIUM CHLORIDE 0.9 %
500 INTRAVENOUS SOLUTION INTRAVENOUS ONCE
Status: COMPLETED | OUTPATIENT
Start: 2023-04-21 | End: 2023-04-21

## 2023-04-21 RX ORDER — NALOXONE HYDROCHLORIDE 0.4 MG/ML
0.4 INJECTION, SOLUTION INTRAMUSCULAR; INTRAVENOUS; SUBCUTANEOUS PRN
Status: DISCONTINUED | OUTPATIENT
Start: 2023-04-21 | End: 2023-04-21 | Stop reason: HOSPADM

## 2023-04-21 RX ORDER — SODIUM CHLORIDE 9 MG/ML
INJECTION, SOLUTION INTRAVENOUS CONTINUOUS
Status: ACTIVE | OUTPATIENT
Start: 2023-04-21 | End: 2023-04-21

## 2023-04-21 RX ORDER — METAXALONE 800 MG/1
800 TABLET ORAL 3 TIMES DAILY
Status: DISCONTINUED | OUTPATIENT
Start: 2023-04-21 | End: 2023-04-21 | Stop reason: HOSPADM

## 2023-04-21 RX ORDER — METAXALONE 800 MG/1
800 TABLET ORAL 3 TIMES DAILY
Qty: 30 TABLET | Refills: 0 | Status: SHIPPED | OUTPATIENT
Start: 2023-04-21 | End: 2023-05-01

## 2023-04-21 RX ADMIN — HYDROMORPHONE HYDROCHLORIDE 0.5 MG: 1 INJECTION, SOLUTION INTRAMUSCULAR; INTRAVENOUS; SUBCUTANEOUS at 07:51

## 2023-04-21 RX ADMIN — HYDROMORPHONE HYDROCHLORIDE 0.5 MG: 1 INJECTION, SOLUTION INTRAMUSCULAR; INTRAVENOUS; SUBCUTANEOUS at 03:43

## 2023-04-21 RX ADMIN — Medication 10 ML: at 08:52

## 2023-04-21 RX ADMIN — SODIUM CHLORIDE 500 ML: 9 INJECTION, SOLUTION INTRAVENOUS at 00:33

## 2023-04-21 RX ADMIN — ENOXAPARIN SODIUM 40 MG: 100 INJECTION SUBCUTANEOUS at 08:53

## 2023-04-21 RX ADMIN — ASPIRIN 81 MG: 81 TABLET, CHEWABLE ORAL at 08:53

## 2023-04-21 RX ADMIN — METOPROLOL SUCCINATE 25 MG: 25 TABLET, EXTENDED RELEASE ORAL at 08:53

## 2023-04-21 RX ADMIN — TICAGRELOR 90 MG: 90 TABLET ORAL at 08:53

## 2023-04-21 RX ADMIN — SODIUM CHLORIDE: 9 INJECTION, SOLUTION INTRAVENOUS at 02:36

## 2023-04-21 ASSESSMENT — PAIN DESCRIPTION - ORIENTATION: ORIENTATION: LEFT

## 2023-04-21 ASSESSMENT — PAIN SCALES - GENERAL
PAINLEVEL_OUTOF10: 7
PAINLEVEL_OUTOF10: 8

## 2023-04-21 ASSESSMENT — PAIN DESCRIPTION - LOCATION
LOCATION: RIB CAGE
LOCATION: CHEST;BACK;SHOULDER

## 2023-04-21 ASSESSMENT — ENCOUNTER SYMPTOMS
RESPIRATORY NEGATIVE: 1
NAUSEA: 1

## 2023-04-21 ASSESSMENT — PAIN DESCRIPTION - DESCRIPTORS: DESCRIPTORS: THROBBING;STABBING;CRAMPING

## 2023-04-21 NOTE — PROGRESS NOTES
Pt transfer from the ED to room 4265. Pt is alert and oriented x4. VSS. Pt denies any pain or complains at this time. All questions answered. Pt oriented to room. Bed in the lowest position and call light within reach. Will continue to monitor.

## 2023-04-21 NOTE — CONSULTS
Team/Cardiology  -Will review imaging and case with Dr. Marshall Capps and further recommendations to follow    All pertinent information and plan of care discussed with Dr. Jeremy Javed. All questions and concerns were addressed with the patient. I have discussed the above stated plan with the patient and the nurse. The patient verbalized understanding and agreed with the plan. Thank you for allowing to us to participate in the care of Gama Cortez      Electronically signed by YADI Toussaint CNP on 4/21/2023 at 10:33 AM        STAFF    Patient left AMA prior to my evaluation. Reviewed imaging. Alis for outpatient follow up.     Jeremy Javed, DO, FACS, FSVS, [address]
18 06/15/2022 04:40 AM            Lab Results   Component Value Date/Time      01/24/2023 06:40 AM     K 4.1 01/24/2023 06:40 AM     BUN 10 01/24/2023 06:40 AM     CREATININE 1.0 01/24/2023 06:40 AM      No results for input(s): WBC, HGB, HCT, PLT in the last 72 hours. Assessment / Plan  1. Atypical Chest Pain  2. CAD of native coronary arteries  3. Hyperlipidemia with goal <70  4. AAA. 4.5 cm (6/2022)  5. Essential hypertension  6. Tobacco abuse       Jordan's chest pain is entirely noncardiac. This is a similar presentation that he had in January. His troponin assays are negative and he has no ischemic changes on his ECG. His chest pain has been going on for greater than 6 hours. If this were cardiac in nature he would have some sort of manifestation of this. His stress test in January was normal as well with this presentation. I did encourage him in complete tobacco abuse cessation. I would continue his current medications including dual antiplatelet therapy at this time. Dr. Karlos Abbott and his team from vascular surgery are seeing him for the enlarged AAA. I will sign off for now. Please feel free to call with any concerns or questions. Thank you very much for allowing me to participate in the care of your patient.

## 2023-04-21 NOTE — PLAN OF CARE
Problem: Discharge Planning  Goal: Discharge to home or other facility with appropriate resources  4/21/2023 1422 by Ney Ferrer RN  Outcome: Completed  4/21/2023 1422 by Ney Ferrer RN  Outcome: Progressing  4/21/2023 1108 by Eliza Bumpers, RN  Outcome: Progressing  4/21/2023 0158 by Jose Acosta RN  Outcome: Progressing     Problem: Pain  Goal: Verbalizes/displays adequate comfort level or baseline comfort level  4/21/2023 1422 by Ney Ferrer RN  Outcome: Completed  4/21/2023 1422 by Ney Ferrer RN  Outcome: Progressing  4/21/2023 1108 by Eliza Bumpers, RN  Outcome: Progressing  4/21/2023 0158 by Jose Acosta RN  Outcome: Progressing

## 2023-04-21 NOTE — PROGRESS NOTES
Patient called nurse to bedside with reports with pain that has not gone away. Obtained order for nitro paste and applied to patient. Patient verbally aggressive toward nurse and states, \"if a doctor does not come in here, then I will be suing this hospital.\" Provider Cherie Covarrubias notified and at bedside to talk to patient. Patient states, Kanchan Jung is a fake doctor and I will not take this. \" Informed patient we have different levels of providers in the ED and that is his provider here in the ED. Patient remains verbally aggressive toward nurse. This nurse informed provider and left room.

## 2023-04-21 NOTE — PLAN OF CARE
Problem: Discharge Planning  Goal: Discharge to home or other facility with appropriate resources  4/21/2023 1108 by Joby Harris, RN  Outcome: Progressing  4/21/2023 0158 by Kenia Bee RN  Outcome: Progressing

## 2023-04-21 NOTE — PROGRESS NOTES
Pt calling out and walking hallway yelling and being verbally aggressive with staff. Pt threatening staff and using profanity. Encouraged pt to go back in room. Notified pt that we called Dr. Ford Nissen to let him know that he would like to leave. Pt threatened to leave AMA. Offered pt to sign AMA. Pt refused and stated he will not sign paperwork, but wanted to wait for doctor response. Notified Dr. Ford Nissen again. New order for discharge placed.

## 2023-04-21 NOTE — PROGRESS NOTES
Pt is very upset about not knowing what's going on with his care plan. Pt ready to see the doctor right now, I indicated that we don't know when the cardiologist will be available. I stated that they may be in surgery at the moment we just don't know, he stated, \"to get him out of surgery now\".  Electronically signed by Juana Gottron, RN on 4/21/2023 at 10:41 AM

## 2023-04-21 NOTE — PROGRESS NOTES
Pt verbally aggressive with staff and threatening to leave AMA. Pt stated he needed to leave to pick his daughter up from school and needs to leave now. Notified Dr. Mira Randhawa of pt concerns and issues. Waiting on response.

## 2023-04-21 NOTE — PROGRESS NOTES
Pt is stating that we are all incompetent and he's going to tell the insurance company that the hospital is committing fraud, due no one being able to tell him Sherrill Reasons he is still here. \"  These statement were made after being made aware that the doctor is also aware that he want to be seen now.   Electronically signed by Trina Cartwright RN on 4/21/2023 at 11:20 AM

## 2023-04-21 NOTE — PROGRESS NOTES
4 Eyes Skin Assessment     NAME:  Choco Jj  YOB: 1960  MEDICAL RECORD NUMBER:  [id number]    The patient is being assessed for  Admission    I agree that at lease one RN has performed a thorough Head to Toe Skin Assessment on the patient. ALL assessment sites listed below have been assessed. Areas assessed by both nurses:    Head, Face, Ears, Shoulders, Back, Chest, Arms, Elbows, Hands, Sacrum. Buttock, Coccyx, Ischium, and Legs. Feet and Heels        Does the Patient have a Wound?  No noted wound(s)       North Prevention initiated by RN: No  Wound Care Orders initiated by RN: No    Pressure Injury (Stage 3,4, Unstageable, DTI, NWPT, and Complex wounds) if present, place referral order by RN under : No    New Ostomies, if present place, referral order under : No     Nurse 1 eSignature: Electronically signed by Coco Kerns RN on 4/21/23 at 2:05 AM EDT    **SHARE this note so that the co-signing nurse can place an eSignature**    Nurse 2 eSignature: {Esignature:[id number]}

## 2023-04-21 NOTE — ACP (ADVANCE CARE PLANNING)
Advance Care Planning     Advance Care Planning Activator (Inpatient)  Conversation Note      Date of ACP Conversation: 4/21/2023     Conversation Conducted with: Patient with Decision Making Capacity    ACP Activator: Simran Belle RN    Health Care Decision Maker:     Current Designated Health Care Decision Maker:   Pt declined to name anyone as he alternate decision maker; stated God was his decision maker. Later did state he was working on [employer] paperwork as his wife should not be making life & death decisions. Today we declined to name anyone, he does not want his wife to be is alternate decision maker    Care Preferences    Ventilation: \"If you were in your present state of health and suddenly became very ill and were unable to breathe on your own, what would your preference be about the use of a ventilator (breathing machine) if it were available to you? \"      Would the patient desire the use of ventilator (breathing machine)?: yes    \"If your health worsens and it becomes clear that your chance of recovery is unlikely, what would your preference be about the use of a ventilator (breathing machine) if it were available to you? \"     Would the patient desire the use of ventilator (breathing machine)?: No      Resuscitation  \"CPR works best to restart the heart when there is a sudden event, like a heart attack, in someone who is otherwise healthy. Unfortunately, CPR does not typically restart the heart for people who have serious health conditions or who are very sick. \"    \"In the event your heart stopped as a result of an underlying serious health condition, would you want attempts to be made to restart your heart (answer \"yes\" for attempt to resuscitate) or would you prefer a natural death (answer \"no\" for do not attempt to resuscitate)? \" yes       [] Yes   [x] No   Educated Patient / Juanis Fat regarding differences between Advance Directives and portable DNR orders.     Length of ACP Conversation

## 2023-04-21 NOTE — PROGRESS NOTES
ED SBAR report provider to Mariajose Alcantar RN. Patient to be transported to Room 4265 via stretcher by transport tech. Patient transported with bedside cardiac monitor. IV site clean, dry, and intact. MEWS score and pain assessed as 8/10 and documented. Updated patient on plan of care.

## 2023-04-21 NOTE — PROGRESS NOTES
Pt continues to be rude towards staff, raising voice and being verbally abusive.  Electronically signed by Do Montoya RN on 4/21/2023 at 11:54 AM

## 2023-04-24 ENCOUNTER — CARE COORDINATION (OUTPATIENT)
Dept: CASE MANAGEMENT | Age: 63
End: 2023-04-24

## 2023-04-24 DIAGNOSIS — I20.0 UNSTABLE ANGINA (HCC): Primary | ICD-10-CM

## 2023-04-24 PROCEDURE — 1111F DSCHRG MED/CURRENT MED MERGE: CPT | Performed by: FAMILY MEDICINE

## 2023-04-24 RX ORDER — METAXALONE 800 MG/1
800 TABLET ORAL 3 TIMES DAILY
Qty: 30 TABLET | Refills: 0 | OUTPATIENT
Start: 2023-04-24 | End: 2023-05-04

## 2023-04-24 SDOH — ECONOMIC STABILITY: FOOD INSECURITY: WITHIN THE PAST 12 MONTHS, YOU WORRIED THAT YOUR FOOD WOULD RUN OUT BEFORE YOU GOT MONEY TO BUY MORE.: NEVER TRUE

## 2023-04-24 SDOH — ECONOMIC STABILITY: HOUSING INSECURITY
IN THE LAST 12 MONTHS, WAS THERE A TIME WHEN YOU DID NOT HAVE A STEADY PLACE TO SLEEP OR SLEPT IN A SHELTER (INCLUDING NOW)?: NO

## 2023-04-24 SDOH — ECONOMIC STABILITY: FOOD INSECURITY: WITHIN THE PAST 12 MONTHS, THE FOOD YOU BOUGHT JUST DIDN'T LAST AND YOU DIDN'T HAVE MONEY TO GET MORE.: NEVER TRUE

## 2023-04-24 SDOH — ECONOMIC STABILITY: INCOME INSECURITY: HOW HARD IS IT FOR YOU TO PAY FOR THE VERY BASICS LIKE FOOD, HOUSING, MEDICAL CARE, AND HEATING?: NOT HARD AT ALL

## 2023-04-24 SDOH — ECONOMIC STABILITY: TRANSPORTATION INSECURITY
IN THE PAST 12 MONTHS, HAS LACK OF TRANSPORTATION KEPT YOU FROM MEETINGS, WORK, OR FROM GETTING THINGS NEEDED FOR DAILY LIVING?: NO

## 2023-04-24 ASSESSMENT — ENCOUNTER SYMPTOMS: SHORTNESS OF BREATH: 0

## 2023-04-24 NOTE — PROGRESS NOTES
Subjective:      Patient ID: Jeanie Tate is a 70 y.o. male. [facility]    Hospital Follow Up / Chest Pain / AAA:  Patient presented to ER on 4-20-23 with sharp / severe left chest pain that started around noon and worse with deep breathing. He took 2 SL nitro without any relief. CXR and EKG were normal.  Troponin was normal.  He has a history of STEMI on 6/2022 with 1 stent placed. He had a normal stress myoview on 1-22-23. He was admitted for observation. He was seen by Dr. Tavo Ackerman (Cardiology) and felt to have non-cardiac chest pain. He was seen by Dr. Betina Lopez (Vascular Surgery) to follow his AAA. It has enlarged from 4.5 cm one year ago to 5.1 cm now. He will follow up with him later this week. He was discharged to home on 4-21-23 and was advised to continue his Brilinta and ASA 81 mg daily. He states that the pain is much improved now. It is worse with movement of the arms. Tobacco Abuse:  Patient continues to smoke on occasion. He is not yet ready to quit. Review of Systems   Constitutional:  Negative for chills and fever. Respiratory:  Negative for shortness of breath. Cardiovascular:  Positive for chest pain. Negative for palpitations and leg swelling. /68   Ht 5' 6\" (1.676 m)   Wt 121 lb 6.4 oz (55.1 kg)   BMI 19.59 kg/m²    Objective:   Physical Exam  Vitals reviewed. Constitutional:       General: He is not in acute distress. Appearance: He is well-developed. HENT:      Head: Normocephalic. Right Ear: External ear normal.      Left Ear: External ear normal.   Neck:      Thyroid: No thyromegaly. Vascular: No carotid bruit or JVD. Cardiovascular:      Rate and Rhythm: Normal rate and regular rhythm. Heart sounds: Normal heart sounds. No murmur heard. Pulmonary:      Effort: Pulmonary effort is normal.      Breath sounds: Normal breath sounds. No wheezing or rales. Lymphadenopathy:      Cervical: No cervical adenopathy.    Neurological:

## 2023-04-24 NOTE — CARE COORDINATION
[facility] Care Transitions Initial Follow Up Call    Call within 2 business days of discharge: Yes    Patient Current Location:  Home: [address] [place] [address]    Care Transition Nurse contacted the patient by telephone to perform post hospital discharge assessment. Verified name and  with patient as identifiers. Provided introduction to self, and explanation of the Care Transition Nurse role. Patient: Britta Plascencia Patient : 1960   MRN: [id number]  Reason for Admission: CP  Discharge Date: 23 RARS: Readmission Risk Score: 9.6      Last Discharge [address]       Date Complaint Diagnosis Description Type Department Provider    23 Chest Pain Chest pain, unspecified type . .. ED to Hosp-Admission (Discharged) (ADMITTED) Qing Nur MD; Fiorella Galindo. .. Was this an external facility discharge? No Discharge Facility: n/a    Challenges to be reviewed by the provider   Additional needs identified to be addressed with provider: No  none               Method of communication with provider: none. Spoke with pt who states he is doing \"fair\" today. Denies any CP or SOB today. States he has no fever, chills, N/V or other issues. Pt has his \"basic average pain\" described in his back. Pt denies issues with getting around or driving. States he is trying to quit smoking. Did not have any cigarettes yesterday but has smoked today. Advised against smoking and using nicotine patch. Pt verbalized understanding. States he was not able to obtain skelaxin. This needs a PA. States he has reached out to the hospital who asked he reach out to PCP. Pt reached out to them and also set up a HFU. Pt will be driving himself to PCP appt but uses insurance transportation at times if needed. When asked if there was anything writer could help with, pt asked if he could talk to a  about counseling.  States he saw a counselor but with insurance changes he doesn't know where

## 2023-04-25 ENCOUNTER — HOSPITAL ENCOUNTER (OUTPATIENT)
Age: 63
Discharge: HOME | End: 2023-04-25
Payer: MEDICARE

## 2023-04-25 ENCOUNTER — OFFICE VISIT (OUTPATIENT)
Dept: FAMILY MEDICINE CLINIC | Age: 63
End: 2023-04-25

## 2023-04-25 ENCOUNTER — HOSPITAL ENCOUNTER (OUTPATIENT)
Dept: GENERAL RADIOLOGY | Age: 63
Discharge: HOME | End: 2023-04-25
Payer: MEDICARE

## 2023-04-25 ENCOUNTER — CARE COORDINATION (OUTPATIENT)
Dept: CARE COORDINATION | Age: 63
End: 2023-04-25

## 2023-04-25 VITALS
BODY MASS INDEX: 19.51 KG/M2 | WEIGHT: 121.4 LBS | SYSTOLIC BLOOD PRESSURE: 110 MMHG | HEIGHT: 66 IN | DIASTOLIC BLOOD PRESSURE: 68 MMHG

## 2023-04-25 DIAGNOSIS — Z09 HOSPITAL DISCHARGE FOLLOW-UP: ICD-10-CM

## 2023-04-25 DIAGNOSIS — I71.40 ABDOMINAL AORTIC ANEURYSM (AAA) WITHOUT RUPTURE, UNSPECIFIED PART (HCC): ICD-10-CM

## 2023-04-25 DIAGNOSIS — M54.16 LUMBAR RADICULOPATHY: ICD-10-CM

## 2023-04-25 DIAGNOSIS — Z72.0 TOBACCO ABUSE: ICD-10-CM

## 2023-04-25 DIAGNOSIS — M54.12 RADICULOPATHY, CERVICAL: ICD-10-CM

## 2023-04-25 DIAGNOSIS — R07.9 LEFT-SIDED CHEST PAIN: Primary | ICD-10-CM

## 2023-04-25 PROCEDURE — 72110 X-RAY EXAM L-2 SPINE 4/>VWS: CPT

## 2023-04-25 PROCEDURE — 72050 X-RAY EXAM NECK SPINE 4/5VWS: CPT

## 2023-04-25 NOTE — CARE COORDINATION
Call to patient to initiate SW referral. Pt reported that his insurance changed to [facility] and his previous counselor did not accept his insurance. SW inquired if he reached out to [facility] for list of providers. Pt stated several times. He received a list as well and none of the providers were accepting new patients or the waiting list was 90 days long. Suggested putting himself on the for at least two providers and informing the office to let him know if any cancellations occur. Explained that with counseling people can change their minds of discontinue services at any time. Pt response was that it increases his stress having to wait. Pt is unable to use his Medicaid due to it being secondary. Encouraged pt to consider waiting list. No other options available for patient.

## 2023-04-27 ENCOUNTER — OFFICE VISIT (OUTPATIENT)
Dept: VASCULAR SURGERY | Age: 63
End: 2023-04-27
Payer: MEDICARE

## 2023-04-27 VITALS — BODY MASS INDEX: 19.59 KG/M2 | HEIGHT: 66 IN

## 2023-04-27 DIAGNOSIS — I71.40 ABDOMINAL AORTIC ANEURYSM (AAA), UNSPECIFIED PART, UNSPECIFIED WHETHER RUPTURED (HCC): Primary | ICD-10-CM

## 2023-04-27 PROCEDURE — 1111F DSCHRG MED/CURRENT MED MERGE: CPT | Performed by: STUDENT IN AN ORGANIZED HEALTH CARE EDUCATION/TRAINING PROGRAM

## 2023-04-27 PROCEDURE — G8420 CALC BMI NORM PARAMETERS: HCPCS | Performed by: STUDENT IN AN ORGANIZED HEALTH CARE EDUCATION/TRAINING PROGRAM

## 2023-04-27 PROCEDURE — 4004F PT TOBACCO SCREEN RCVD TLK: CPT | Performed by: STUDENT IN AN ORGANIZED HEALTH CARE EDUCATION/TRAINING PROGRAM

## 2023-04-27 PROCEDURE — G8428 CUR MEDS NOT DOCUMENT: HCPCS | Performed by: STUDENT IN AN ORGANIZED HEALTH CARE EDUCATION/TRAINING PROGRAM

## 2023-04-27 PROCEDURE — 3017F COLORECTAL CA SCREEN DOC REV: CPT | Performed by: STUDENT IN AN ORGANIZED HEALTH CARE EDUCATION/TRAINING PROGRAM

## 2023-04-27 PROCEDURE — 1123F ACP DISCUSS/DSCN MKR DOCD: CPT | Performed by: STUDENT IN AN ORGANIZED HEALTH CARE EDUCATION/TRAINING PROGRAM

## 2023-04-27 PROCEDURE — 99214 OFFICE O/P EST MOD 30 MIN: CPT | Performed by: STUDENT IN AN ORGANIZED HEALTH CARE EDUCATION/TRAINING PROGRAM

## 2023-04-27 NOTE — PROGRESS NOTES
Dipti Munoz (:  1960) is a 70 y.o. male,Established patient, here for evaluation of the following chief complaint(s):  Follow-Up from Hospital         ASSESSMENT/PLAN:  1. Abdominal aortic aneurysm (AAA), unspecified part, unspecified whether ruptured [facility])    This is a 78-year-old male patient presents with a 5.1 cm abdominal aortic aneurysm. It has shown consistent growth over the last 8 months. On her previous CTA is 4.5 cm and now measures 5.1 cm. And considering that it was 4.84 cm on duplex with growth to 5.1 cm in a couple months this is concerning for continued expansion. Would recommend formally repairing. His TERRI is occluded. Do believe he has an adequate candidate for endovascular aortic aneurysm repair. All the risk benefits and alternatives were clearly reviewed. The risks of endovascular pair include pain infection bleeding embolization thrombosis pose a risk of anesthesia including MI, stroke, death. He understands but due to his home life with his wife requiring significant care he is to wait till his children are out of school to provide that care for the procedure to take place. We will plan for procedure to be done in the early summer. All questions answered. Subjective   SUBJECTIVE/OBJECTIVE:  This is a 70year old male patient who presents for evaluation of known abdominal aortic aneurysm. He was recently in the hospital and underwent a CT scan which was meant to be done previously but has not been following up. He had a scan in February suggested his aneurysm was 4.84 cm and now measures 5.0 to 5.1 cm in diameter based on the CT angiogram.  This is subtle growth but does suggest that the aneurysm is growing. He denies abdominal or back pain. 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 He has no chest or back pain. No shortness of breath. He does work. He denies any ambulatory shortness of breath.           Objective   Physical

## 2023-04-28 NOTE — DISCHARGE SUMMARY
Hospital Medicine Discharge Summary    Patient ID: Darin Barthel      Patient's PCP: Renetta Orourke DO    Admit Date: 4/20/2023     Discharge Date: 4/21/2023      Admitting Physician: Clive Burgess DO     Discharge Physician: Alexandria Perez MD     Discharge Diagnoses: Active Hospital Problems    Diagnosis Date Noted    Peripheral arterial disease ([facility] [address] [I73.9] 03/13/2023     Priority: Medium    Atypical chest pain [R07.89] 01/22/2023     Priority: Medium    Essential hypertension [I10]      Priority: Medium    Hyperlipidemia LDL goal <70 [E78.5] 04/21/2023    Unstable angina ([facility] [address] [I20.0] 04/20/2023    Atherosclerosis of native coronary artery of native heart without angina pectoris [I25.10] 04/20/2023       The patient was seen and examined on day of discharge and this discharge summary is in conjunction with any daily progress note from day of discharge. Hospital Course:     Chest pain:  seen by cardiology and deemed to be non-cardiac. Troponins negative. Tobacco cessation recommended. Cont dual antiplatelet therapy    Enlarging AAA:  seen by vascular surgery, will see him in the office within a week      Exam:     BP (!) 170/90   Pulse 64   Temp 98.2 °F (36.8 °C) (Oral)   Resp 16   Wt 131 lb 2.8 oz (59.5 kg)   SpO2 96%   BMI 21.17 kg/m²     General appearance: No apparent distress, appears stated age and cooperative. HEENT: Pupils equal, round, and reactive to light. Conjunctivae/corneas clear. Neck: Supple, with full range of motion. No jugular venous distention. Trachea midline. Respiratory:  Normal respiratory effort. Clear to auscultation, bilaterally without Rales/Wheezes/Rhonchi. Cardiovascular: Regular rate and rhythm with normal S1/S2 without murmurs, rubs or gallops. Abdomen: Soft, non-tender, non-distended with normal bowel sounds. Musculoskelatal: No clubbing, cyanosis or edema bilaterally. Full range of motion without deformity.   Skin: Skin color,

## 2023-05-02 ENCOUNTER — CARE COORDINATION (OUTPATIENT)
Dept: CASE MANAGEMENT | Age: 63
End: 2023-05-02

## 2023-05-02 NOTE — CARE COORDINATION
treatment adherence and medication management-     Offered patient enrollment in the Remote Patient Monitoring (RPM) program for in-home monitoring: NA.     Care Transitions Subsequent and Final Call    Subsequent and Final Calls  Care Transitions Interventions  Other Interventions:             Care Transition Nurse provided contact information for future needs. Plan for follow-up call in 5-7 days based on severity of symptoms and risk factors.   Plan for next call: symptom management-   self management-     [facility]

## 2023-05-09 ENCOUNTER — CARE COORDINATION (OUTPATIENT)
Dept: CASE MANAGEMENT | Age: 63
End: 2023-05-09

## 2023-05-09 NOTE — CARE COORDINATION
[facility] Care Transitions Follow Up Call    Patient Current Location:  Home: [address] [address]    LPN Care Coordinator contacted the patient by telephone to follow up after admission on -. Verified name and  with patient as identifiers. Patient: Ok Mendez  Patient : 1960   MRN: <[id number]>  Reason for Admission: Unstable Angina   Discharge Date: 23 RARS: Readmission Risk Score: 9.6      Needs to be reviewed by the provider   Additional needs identified to be addressed with provider:                Method of communication with provider: . LPN CC spoke with Jo-Ann Rivas. Jo-Ann Rivas reports that he is doing fine. Denies CP, palpitations, SOB. States he has chronic pain and nausea. These are at baseline. Patient does not routinely monitor BP or HR. LPN CC offered RPM, patient declined. Does not feel he needs currently. LPN  CC did educate on benefits d/t patient admission & cardiac risk. Appetite fine. States he has some ongoing ( chronic ?) issues with \"going to the bathroom too much\". LPN CC encouraged patient to discuss with providers. Patient reports he has been decreasing the number of cigarettes he smokes daily. LPN CC encouraged smoking cessation. . LPN CC educated patient on appropriate use of nicotine patches, patient verbalized understanding. Patient  has completed his HFU as appropriate . Drives himself  to appointments,Had appointment for his chronic back issues today. Denies  medication changes. Patient is agreeable  to ACP docs &discussion, LPN CC to route ACP pool &have documents mailed to patient at the end of this encounter. Denies needs,. Addressed changes since last contact:    Discussed follow-up appointments. If no appointment was previously scheduled, appointment scheduling offered: Yes. Is follow up appointment scheduled within 7 days of discharge? Yes.     Follow Up  Future Appointments   Date Time Provider Emma Galo   2023  1:00 PM

## 2023-05-15 ENCOUNTER — TELEPHONE (OUTPATIENT)
Dept: CARE COORDINATION | Age: 63
End: 2023-05-15

## 2023-05-15 NOTE — TELEPHONE ENCOUNTER
SW placed outreach call to patient to discuss AD's. CTN mailed packet to patient last week. Patient confirmed he received the packet and was just starting to look it over. He does not have any questions at this time. SW encouraged him to call this worker if any questions arise. SW confirmed he has this worker's call back number.     Saray WARNER, [place]     [phone]

## 2023-05-16 ENCOUNTER — CARE COORDINATION (OUTPATIENT)
Dept: CASE MANAGEMENT | Age: 63
End: 2023-05-16

## 2023-05-16 NOTE — CARE COORDINATION
[facility] Care Transitions Initial Follow Up Call    Call within 2 business days of discharge: Yes    Patient: Josue Alves Patient : 1960   MRN: [id number]  Reason for Admission: CP  Discharge Date: 23 RARS: Readmission Risk Score: 9.6      Last Discharge [address]       Date Complaint Diagnosis Description Type Department Provider    23 Chest Pain Chest pain, unspecified type . .. ED to Hosp-Admission (Discharged) (ADMITTED) Chyna Bauer MD; Ebony Siddiqi. .. Follow up outreach call attempt, no answer. CTN will continue with outreach call attempts.     Follow Up  Future Appointments   Date Time Provider Emma Galo   2023  4:15 PM [facility]   2023  5:15 PM [facility]   2023  1:00 PM Neisha Kirby MD [facility]     LEVON Keane, RN   Care Transition Nurse  Mobile: [phone]

## 2023-05-23 ENCOUNTER — CARE COORDINATION (OUTPATIENT)
Dept: CASE MANAGEMENT | Age: 63
End: 2023-05-23

## 2023-05-23 NOTE — CARE COORDINATION
[facility] Care Transitions Follow Up Call    Patient Current Location:  Home: [address] [place] [address]    Care Transition Nurse contacted the patient by telephone to follow up after admission. Verified name and  with patient as identifiers. Patient: Denisha Wall  Patient : 1960   MRN: [id number]  Reason for Admission: CP  Discharge Date: 23 RARS: Readmission Risk Score: 9.6      Conversation was kept brief with pt. He states things are \"here and there. \" Pt denies any CP or SOB. States he has a different problem. Did not go into detail of what this problem was. When asked if he would like to discuss with CTN, pt states \"well I don't really know enough about it. \" When asked if there was anything CTN could do for pt he stated unless writer could give him pain medication there was nothing writer could do. Pt did not wish to engage any further in conversation. Pt has an appt with PCP tomorrow. He will discuss his needs then. Informed pt this is his final call. Will resolve episode at this time.      Follow Up  Future Appointments   Date Time Provider Department Center   2023  2:00 PM DO Allie Harding  [place] - DYD   2023  4:15 PM [facility] 1 [facility]   2023  5:15 PM [facility] 1 [facility]   2023  1:00 PM Chikis Marshall MD [facility]     LEVON Roberto, RN   Care Transition Nurse  Mobile: [phone]

## 2023-05-30 ASSESSMENT — ENCOUNTER SYMPTOMS: BACK PAIN: 1

## 2023-05-30 NOTE — PROGRESS NOTES
Subjective:      Patient ID: Will Brown is a 70 y.o. male. HPI    Left Leg Pain / Weakness:  Patient has a prior history of left low back pain and left sciatica. He complains of a 2 week history of pain from the left low back to the left hip and left groin. He denies any known injury. The pain is intermittent, positional and severe at times. Aspirin and heat did not help. It has not changed over time. He had a lumbar X-ray on 4-26-23 showing moderate degenerative disc changes at L5-S1. He is going for a lumbar MRI today per Dr. María Davis. Review of Systems   Constitutional:  Negative for chills and fever. Musculoskeletal:  Positive for back pain and myalgias. /60   Ht 5' 6\" (1.676 m)   Wt 119 lb 9.6 oz (54.3 kg)   BMI 19.30 kg/m²    Objective:   Physical Exam  Constitutional:       General: He is not in acute distress. Appearance: Normal appearance. He is normal weight. He is not ill-appearing or toxic-appearing. Musculoskeletal:      Comments: No tenderness or spasm to palpation. Flex  30  Degrees, extend 10  Degrees,  Side bend 20  Degrees bilaterally,  Rotation 45   Degrees bilaterally. Reflexes equal bilaterally and +2/4. Negative SLR and figure 4 test bilaterally. Pain with flexion, internal and external rotation of the left hip. Hernia check negative bilaterally. Neurological:      Mental Status: He is alert and oriented to person, place, and time. Assessment:      DDD Lumbar Spine   Left Hip Pain       Plan:      X-ray of left hip.    Await MRI of the lumbar spine   Rx Medrol Dosepak as directed   RTO as needed         [address] Swartz DO

## 2023-05-31 ENCOUNTER — OFFICE VISIT (OUTPATIENT)
Dept: FAMILY MEDICINE CLINIC | Age: 63
End: 2023-05-31

## 2023-05-31 ENCOUNTER — HOSPITAL ENCOUNTER (OUTPATIENT)
Dept: MRI IMAGING | Age: 63
Discharge: HOME | End: 2023-05-31
Payer: MEDICARE

## 2023-05-31 ENCOUNTER — HOSPITAL ENCOUNTER (OUTPATIENT)
Dept: GENERAL RADIOLOGY | Age: 63
Discharge: HOME | End: 2023-05-31
Attending: FAMILY MEDICINE
Payer: MEDICARE

## 2023-05-31 ENCOUNTER — TELEPHONE (OUTPATIENT)
Dept: VASCULAR SURGERY | Age: 63
End: 2023-05-31

## 2023-05-31 ENCOUNTER — TELEPHONE (OUTPATIENT)
Dept: CARDIOLOGY CLINIC | Age: 63
End: 2023-05-31

## 2023-05-31 VITALS
WEIGHT: 119.6 LBS | HEIGHT: 66 IN | SYSTOLIC BLOOD PRESSURE: 104 MMHG | DIASTOLIC BLOOD PRESSURE: 60 MMHG | BODY MASS INDEX: 19.22 KG/M2

## 2023-05-31 DIAGNOSIS — M51.36 DDD (DEGENERATIVE DISC DISEASE), LUMBAR: Primary | ICD-10-CM

## 2023-05-31 DIAGNOSIS — M54.16 LUMBAR RADICULOPATHY: ICD-10-CM

## 2023-05-31 DIAGNOSIS — M25.552 LEFT HIP PAIN: ICD-10-CM

## 2023-05-31 DIAGNOSIS — M50.10 HERNIATION OF CERVICAL INTERVERTEBRAL DISC WITH RADICULOPATHY: ICD-10-CM

## 2023-05-31 PROCEDURE — 73502 X-RAY EXAM HIP UNI 2-3 VIEWS: CPT

## 2023-05-31 PROCEDURE — 72141 MRI NECK SPINE W/O DYE: CPT

## 2023-05-31 PROCEDURE — 72148 MRI LUMBAR SPINE W/O DYE: CPT

## 2023-05-31 RX ORDER — METHYLPREDNISOLONE 4 MG/1
TABLET ORAL
Qty: 21 TABLET | Refills: 0 | Status: SHIPPED | OUTPATIENT
Start: 2023-05-31 | End: 2023-06-06

## 2023-05-31 NOTE — TELEPHONE ENCOUNTER
Dr. Jono Gutierres please review and advise for a cardiac clearance. Thank you. Patient will be undergoing an AAA repair with Dr. Shantell Manning 6/27/23. Requesting to hold Brilinta and ASA for 7 days prior to procedure. History of CAD with PCI 6/14/22 (x1DES to RCA). Seen in the hospital 4/20/23. Seen in office 2/23/23.

## 2023-05-31 NOTE — TELEPHONE ENCOUNTER
CARDIAC CLEARANCE     What type of procedure are you having? Endo repair of AAA     Which physician is performing your procedure? Dr. Alejandra Davis    When is your procedure scheduled for?  6/27/23    Where are you having this procedure? [facility]     Are you taking Blood Thinners? If so what? (Name/dose/frequesncy)  Brilinta and ASA    Does the surgeon want you to stop your blood thinner?   If so for how long?  7 days     Phone Number and Contact Name for Physicians office:  [phone]: upload in [address] [place] [address] Rd, no need to fax to office

## 2023-06-04 DIAGNOSIS — M87.051 AVASCULAR NECROSIS OF BONES OF BOTH HIPS (HCC): Primary | ICD-10-CM

## 2023-06-04 DIAGNOSIS — M87.052 AVASCULAR NECROSIS OF BONES OF BOTH HIPS (HCC): Primary | ICD-10-CM

## 2023-06-16 ENCOUNTER — TELEPHONE (OUTPATIENT)
Dept: FAMILY MEDICINE CLINIC | Age: 63
End: 2023-06-16

## 2023-06-17 PROBLEM — I21.9 MI (MYOCARDIAL INFARCTION) (HCC): Status: ACTIVE | Noted: 2022-01-01

## 2023-06-17 PROBLEM — I25.2 HISTORY OF MI (MYOCARDIAL INFARCTION): Status: ACTIVE | Noted: 2022-01-01

## 2023-06-20 ENCOUNTER — TELEPHONE (OUTPATIENT)
Dept: VASCULAR SURGERY | Age: 63
End: 2023-06-20

## 2023-06-20 NOTE — TELEPHONE ENCOUNTER
Called pt to remind him not to take Brilinta or ASA beginning today. Pt states he has not yet taken this morning and will hold until surgery.

## 2023-06-26 ENCOUNTER — OFFICE VISIT (OUTPATIENT)
Dept: FAMILY MEDICINE CLINIC | Age: 63
End: 2023-06-26

## 2023-06-26 ENCOUNTER — ANESTHESIA EVENT (OUTPATIENT)
Dept: OPERATING ROOM | Age: 63
DRG: 269 | End: 2023-06-26
Payer: MEDICARE

## 2023-06-26 VITALS
DIASTOLIC BLOOD PRESSURE: 82 MMHG | HEIGHT: 66 IN | BODY MASS INDEX: 18.19 KG/M2 | WEIGHT: 113.2 LBS | SYSTOLIC BLOOD PRESSURE: 130 MMHG

## 2023-06-26 DIAGNOSIS — Z01.818 PREOP GENERAL PHYSICAL EXAM: Primary | ICD-10-CM

## 2023-06-26 DIAGNOSIS — I71.40 ABDOMINAL AORTIC ANEURYSM (AAA) WITHOUT RUPTURE, UNSPECIFIED PART (HCC): ICD-10-CM

## 2023-06-26 ASSESSMENT — ENCOUNTER SYMPTOMS
NAUSEA: 0
VOMITING: 0
ABDOMINAL PAIN: 0
SHORTNESS OF BREATH: 0
WHEEZING: 0
CONSTIPATION: 0
RHINORRHEA: 1
BLOOD IN STOOL: 0
COUGH: 0
DIARRHEA: 0
SORE THROAT: 0

## 2023-06-27 ENCOUNTER — TELEPHONE (OUTPATIENT)
Dept: VASCULAR SURGERY | Age: 63
End: 2023-06-27

## 2023-06-27 ENCOUNTER — ANESTHESIA (OUTPATIENT)
Dept: OPERATING ROOM | Age: 63
DRG: 269 | End: 2023-06-27
Payer: MEDICARE

## 2023-06-27 ENCOUNTER — HOSPITAL ENCOUNTER (INPATIENT)
Age: 63
LOS: 1 days | Discharge: HOME | DRG: 269 | End: 2023-06-28
Attending: STUDENT IN AN ORGANIZED HEALTH CARE EDUCATION/TRAINING PROGRAM | Admitting: STUDENT IN AN ORGANIZED HEALTH CARE EDUCATION/TRAINING PROGRAM
Payer: MEDICARE

## 2023-06-27 DIAGNOSIS — G89.18 POST-OP PAIN: Primary | ICD-10-CM

## 2023-06-27 PROBLEM — I71.40 ABDOMINAL AORTIC ANEURYSM (AAA) WITHOUT RUPTURE (HCC): Status: ACTIVE | Noted: 2023-06-27

## 2023-06-27 LAB
ABO + RH BLD: NORMAL
ANION GAP SERPL CALCULATED.3IONS-SCNC: 12 MMOL/L (ref 3–16)
BLD GP AB SCN SERPL QL: NORMAL
BUN SERPL-MCNC: 11 MG/DL (ref 7–20)
CALCIUM SERPL-MCNC: 9.4 MG/DL (ref 8.3–10.6)
CHLORIDE SERPL-SCNC: 103 MMOL/L (ref 99–110)
CO2 SERPL-SCNC: 26 MMOL/L (ref 21–32)
CREAT SERPL-MCNC: 1.1 MG/DL (ref 0.8–1.3)
DEPRECATED RDW RBC AUTO: 14 % (ref 12.4–15.4)
GFR SERPLBLD CREATININE-BSD FMLA CKD-EPI: >60 ML/MIN/{1.73_M2}
GLUCOSE SERPL-MCNC: 95 MG/DL (ref 70–99)
HCT VFR BLD AUTO: 39.9 % (ref 40.5–52.5)
HGB BLD-MCNC: 13.8 G/DL (ref 13.5–17.5)
MCH RBC QN AUTO: 32.7 PG (ref 26–34)
MCHC RBC AUTO-ENTMCNC: 34.5 G/DL (ref 31–36)
MCV RBC AUTO: 94.7 FL (ref 80–100)
PLATELET # BLD AUTO: 150 K/UL (ref 135–450)
PMV BLD AUTO: 8.9 FL (ref 5–10.5)
POTASSIUM SERPL-SCNC: 3.9 MMOL/L (ref 3.5–5.1)
RBC # BLD AUTO: 4.21 M/UL (ref 4.2–5.9)
SODIUM SERPL-SCNC: 141 MMOL/L (ref 136–145)
WBC # BLD AUTO: 4.9 K/UL (ref 4–11)

## 2023-06-27 PROCEDURE — 3700000001 HC ADD 15 MINUTES (ANESTHESIA): Performed by: STUDENT IN AN ORGANIZED HEALTH CARE EDUCATION/TRAINING PROGRAM

## 2023-06-27 PROCEDURE — C1887 CATHETER, GUIDING: HCPCS | Performed by: STUDENT IN AN ORGANIZED HEALTH CARE EDUCATION/TRAINING PROGRAM

## 2023-06-27 PROCEDURE — 03HY32Z INSERTION OF MONITORING DEVICE INTO UPPER ARTERY, PERCUTANEOUS APPROACH: ICD-10-PCS | Performed by: ANESTHESIOLOGY

## 2023-06-27 PROCEDURE — 2700000000 HC OXYGEN THERAPY PER DAY

## 2023-06-27 PROCEDURE — 86900 BLOOD TYPING SEROLOGIC ABO: CPT

## 2023-06-27 PROCEDURE — 80048 BASIC METABOLIC PNL TOTAL CA: CPT

## 2023-06-27 PROCEDURE — 6360000002 HC RX W HCPCS

## 2023-06-27 PROCEDURE — 2500000003 HC RX 250 WO HCPCS: Performed by: STUDENT IN AN ORGANIZED HEALTH CARE EDUCATION/TRAINING PROGRAM

## 2023-06-27 PROCEDURE — C1894 INTRO/SHEATH, NON-LASER: HCPCS | Performed by: STUDENT IN AN ORGANIZED HEALTH CARE EDUCATION/TRAINING PROGRAM

## 2023-06-27 PROCEDURE — 4A133B1 MONITORING OF ARTERIAL PRESSURE, PERIPHERAL, PERCUTANEOUS APPROACH: ICD-10-PCS | Performed by: ANESTHESIOLOGY

## 2023-06-27 PROCEDURE — 6360000002 HC RX W HCPCS: Performed by: NURSE ANESTHETIST, CERTIFIED REGISTERED

## 2023-06-27 PROCEDURE — 6360000002 HC RX W HCPCS: Performed by: STUDENT IN AN ORGANIZED HEALTH CARE EDUCATION/TRAINING PROGRAM

## 2023-06-27 PROCEDURE — C1760 CLOSURE DEV, VASC: HCPCS | Performed by: STUDENT IN AN ORGANIZED HEALTH CARE EDUCATION/TRAINING PROGRAM

## 2023-06-27 PROCEDURE — C1889 IMPLANT/INSERT DEVICE, NOC: HCPCS | Performed by: STUDENT IN AN ORGANIZED HEALTH CARE EDUCATION/TRAINING PROGRAM

## 2023-06-27 PROCEDURE — 6360000004 HC RX CONTRAST MEDICATION: Performed by: STUDENT IN AN ORGANIZED HEALTH CARE EDUCATION/TRAINING PROGRAM

## 2023-06-27 PROCEDURE — 94761 N-INVAS EAR/PLS OXIMETRY MLT: CPT

## 2023-06-27 PROCEDURE — 2500000003 HC RX 250 WO HCPCS

## 2023-06-27 PROCEDURE — 2580000003 HC RX 258: Performed by: NURSE ANESTHETIST, CERTIFIED REGISTERED

## 2023-06-27 PROCEDURE — 04QK0ZZ REPAIR RIGHT FEMORAL ARTERY, OPEN APPROACH: ICD-10-PCS | Performed by: STUDENT IN AN ORGANIZED HEALTH CARE EDUCATION/TRAINING PROGRAM

## 2023-06-27 PROCEDURE — 3600000007 HC SURGERY HYBRID BASE: Performed by: STUDENT IN AN ORGANIZED HEALTH CARE EDUCATION/TRAINING PROGRAM

## 2023-06-27 PROCEDURE — 3600000017 HC SURGERY HYBRID ADDL 15MIN: Performed by: STUDENT IN AN ORGANIZED HEALTH CARE EDUCATION/TRAINING PROGRAM

## 2023-06-27 PROCEDURE — C1769 GUIDE WIRE: HCPCS | Performed by: STUDENT IN AN ORGANIZED HEALTH CARE EDUCATION/TRAINING PROGRAM

## 2023-06-27 PROCEDURE — 3700000000 HC ANESTHESIA ATTENDED CARE: Performed by: STUDENT IN AN ORGANIZED HEALTH CARE EDUCATION/TRAINING PROGRAM

## 2023-06-27 PROCEDURE — 85027 COMPLETE CBC AUTOMATED: CPT

## 2023-06-27 PROCEDURE — 2709999900 HC NON-CHARGEABLE SUPPLY: Performed by: STUDENT IN AN ORGANIZED HEALTH CARE EDUCATION/TRAINING PROGRAM

## 2023-06-27 PROCEDURE — 2500000003 HC RX 250 WO HCPCS: Performed by: NURSE ANESTHETIST, CERTIFIED REGISTERED

## 2023-06-27 PROCEDURE — 4A133J1 MONITORING OF ARTERIAL PULSE, PERIPHERAL, PERCUTANEOUS APPROACH: ICD-10-PCS | Performed by: ANESTHESIOLOGY

## 2023-06-27 PROCEDURE — 04L23DZ OCCLUSION OF GASTRIC ARTERY WITH INTRALUMINAL DEVICE, PERCUTANEOUS APPROACH: ICD-10-PCS | Performed by: STUDENT IN AN ORGANIZED HEALTH CARE EDUCATION/TRAINING PROGRAM

## 2023-06-27 PROCEDURE — 2580000003 HC RX 258: Performed by: STUDENT IN AN ORGANIZED HEALTH CARE EDUCATION/TRAINING PROGRAM

## 2023-06-27 PROCEDURE — 86901 BLOOD TYPING SEROLOGIC RH(D): CPT

## 2023-06-27 PROCEDURE — 86850 RBC ANTIBODY SCREEN: CPT

## 2023-06-27 PROCEDURE — C2628 CATHETER, OCCLUSION: HCPCS | Performed by: STUDENT IN AN ORGANIZED HEALTH CARE EDUCATION/TRAINING PROGRAM

## 2023-06-27 PROCEDURE — 6370000000 HC RX 637 (ALT 250 FOR IP): Performed by: STUDENT IN AN ORGANIZED HEALTH CARE EDUCATION/TRAINING PROGRAM

## 2023-06-27 PROCEDURE — A4217 STERILE WATER/SALINE, 500 ML: HCPCS | Performed by: STUDENT IN AN ORGANIZED HEALTH CARE EDUCATION/TRAINING PROGRAM

## 2023-06-27 PROCEDURE — 2100000000 HC CCU R&B

## 2023-06-27 PROCEDURE — C1768 GRAFT, VASCULAR: HCPCS | Performed by: STUDENT IN AN ORGANIZED HEALTH CARE EDUCATION/TRAINING PROGRAM

## 2023-06-27 PROCEDURE — 04V03DZ RESTRICTION OF ABDOMINAL AORTA WITH INTRALUMINAL DEVICE, PERCUTANEOUS APPROACH: ICD-10-PCS | Performed by: STUDENT IN AN ORGANIZED HEALTH CARE EDUCATION/TRAINING PROGRAM

## 2023-06-27 DEVICE — EXCLUDER AAA ENDO CONTRA LEG 16MMX12MMX10CM 12FR
Type: IMPLANTABLE DEVICE | Site: COMMON ILIAC | Status: FUNCTIONAL
Brand: GORE EXCLUDER AAA ENDOPROSTHESIS

## 2023-06-27 DEVICE — EXCLUDER CONFORM AAA TRNK ENDO 26MMX14.5MMX12CM 16FR
Type: IMPLANTABLE DEVICE | Site: COMMON ILIAC | Status: FUNCTIONAL
Brand: GORE EXCLUDER CONF AAA ENDO - TRUNK-IPSILATERAL

## 2023-06-27 DEVICE — AZUR
Type: IMPLANTABLE DEVICE | Site: COMMON ILIAC | Status: FUNCTIONAL
Brand: AZUR CX DETACHABLE

## 2023-06-27 RX ORDER — HEPARIN SODIUM 1000 [USP'U]/ML
INJECTION, SOLUTION INTRAVENOUS; SUBCUTANEOUS PRN
Status: DISCONTINUED | OUTPATIENT
Start: 2023-06-27 | End: 2023-06-27 | Stop reason: SDUPTHER

## 2023-06-27 RX ORDER — SODIUM CHLORIDE 9 MG/ML
INJECTION, SOLUTION INTRAVENOUS PRN
Status: DISCONTINUED | OUTPATIENT
Start: 2023-06-27 | End: 2023-06-28 | Stop reason: HOSPADM

## 2023-06-27 RX ORDER — ONDANSETRON 2 MG/ML
INJECTION INTRAMUSCULAR; INTRAVENOUS PRN
Status: DISCONTINUED | OUTPATIENT
Start: 2023-06-27 | End: 2023-06-27 | Stop reason: SDUPTHER

## 2023-06-27 RX ORDER — OXYCODONE HYDROCHLORIDE 5 MG/1
5 TABLET ORAL EVERY 4 HOURS PRN
Status: DISCONTINUED | OUTPATIENT
Start: 2023-06-27 | End: 2023-06-28 | Stop reason: HOSPADM

## 2023-06-27 RX ORDER — SODIUM CHLORIDE 9 MG/ML
INJECTION, SOLUTION INTRAVENOUS CONTINUOUS
Status: DISCONTINUED | OUTPATIENT
Start: 2023-06-27 | End: 2023-06-28

## 2023-06-27 RX ORDER — OXYCODONE HYDROCHLORIDE 10 MG/1
10 TABLET ORAL EVERY 4 HOURS PRN
Status: DISCONTINUED | OUTPATIENT
Start: 2023-06-27 | End: 2023-06-28 | Stop reason: HOSPADM

## 2023-06-27 RX ORDER — MIDAZOLAM HYDROCHLORIDE 1 MG/ML
INJECTION INTRAMUSCULAR; INTRAVENOUS PRN
Status: DISCONTINUED | OUTPATIENT
Start: 2023-06-27 | End: 2023-06-27 | Stop reason: SDUPTHER

## 2023-06-27 RX ORDER — SODIUM CHLORIDE 0.9 % (FLUSH) 0.9 %
5-40 SYRINGE (ML) INJECTION PRN
Status: DISCONTINUED | OUTPATIENT
Start: 2023-06-27 | End: 2023-06-28 | Stop reason: HOSPADM

## 2023-06-27 RX ORDER — ONDANSETRON 4 MG/1
4 TABLET, ORALLY DISINTEGRATING ORAL EVERY 8 HOURS PRN
Status: DISCONTINUED | OUTPATIENT
Start: 2023-06-27 | End: 2023-06-28 | Stop reason: HOSPADM

## 2023-06-27 RX ORDER — SUCCINYLCHOLINE/SOD CL,ISO/PF 200MG/10ML
SYRINGE (ML) INTRAVENOUS PRN
Status: DISCONTINUED | OUTPATIENT
Start: 2023-06-27 | End: 2023-06-27 | Stop reason: SDUPTHER

## 2023-06-27 RX ORDER — FENTANYL CITRATE 50 UG/ML
INJECTION, SOLUTION INTRAMUSCULAR; INTRAVENOUS PRN
Status: DISCONTINUED | OUTPATIENT
Start: 2023-06-27 | End: 2023-06-27 | Stop reason: SDUPTHER

## 2023-06-27 RX ORDER — SODIUM CHLORIDE 9 MG/ML
INJECTION, SOLUTION INTRAVENOUS CONTINUOUS PRN
Status: DISCONTINUED | OUTPATIENT
Start: 2023-06-27 | End: 2023-06-27 | Stop reason: SDUPTHER

## 2023-06-27 RX ORDER — SODIUM CHLORIDE 0.9 % (FLUSH) 0.9 %
5-40 SYRINGE (ML) INJECTION EVERY 12 HOURS SCHEDULED
Status: DISCONTINUED | OUTPATIENT
Start: 2023-06-27 | End: 2023-06-28 | Stop reason: HOSPADM

## 2023-06-27 RX ORDER — DEXAMETHASONE SODIUM PHOSPHATE 4 MG/ML
INJECTION, SOLUTION INTRA-ARTICULAR; INTRALESIONAL; INTRAMUSCULAR; INTRAVENOUS; SOFT TISSUE PRN
Status: DISCONTINUED | OUTPATIENT
Start: 2023-06-27 | End: 2023-06-27 | Stop reason: SDUPTHER

## 2023-06-27 RX ORDER — ESMOLOL HYDROCHLORIDE 10 MG/ML
INJECTION INTRAVENOUS PRN
Status: DISCONTINUED | OUTPATIENT
Start: 2023-06-27 | End: 2023-06-27 | Stop reason: SDUPTHER

## 2023-06-27 RX ORDER — LABETALOL HYDROCHLORIDE 5 MG/ML
INJECTION, SOLUTION INTRAVENOUS PRN
Status: DISCONTINUED | OUTPATIENT
Start: 2023-06-27 | End: 2023-06-27 | Stop reason: SDUPTHER

## 2023-06-27 RX ORDER — PROPOFOL 10 MG/ML
INJECTION, EMULSION INTRAVENOUS PRN
Status: DISCONTINUED | OUTPATIENT
Start: 2023-06-27 | End: 2023-06-27 | Stop reason: SDUPTHER

## 2023-06-27 RX ORDER — ROCURONIUM BROMIDE 10 MG/ML
INJECTION, SOLUTION INTRAVENOUS PRN
Status: DISCONTINUED | OUTPATIENT
Start: 2023-06-27 | End: 2023-06-27 | Stop reason: SDUPTHER

## 2023-06-27 RX ORDER — ONDANSETRON 2 MG/ML
4 INJECTION INTRAMUSCULAR; INTRAVENOUS EVERY 6 HOURS PRN
Status: DISCONTINUED | OUTPATIENT
Start: 2023-06-27 | End: 2023-06-28 | Stop reason: HOSPADM

## 2023-06-27 RX ORDER — METOPROLOL SUCCINATE 25 MG/1
25 TABLET, EXTENDED RELEASE ORAL DAILY
Status: DISCONTINUED | OUTPATIENT
Start: 2023-06-28 | End: 2023-06-27

## 2023-06-27 RX ORDER — SODIUM CHLORIDE 9 MG/ML
INJECTION, SOLUTION INTRAVENOUS PRN
Status: DISCONTINUED | OUTPATIENT
Start: 2023-06-27 | End: 2023-06-27 | Stop reason: HOSPADM

## 2023-06-27 RX ORDER — NITROGLYCERIN 0.4 MG/1
0.4 TABLET SUBLINGUAL EVERY 5 MIN PRN
Status: DISCONTINUED | OUTPATIENT
Start: 2023-06-27 | End: 2023-06-28 | Stop reason: HOSPADM

## 2023-06-27 RX ORDER — LIDOCAINE HYDROCHLORIDE 20 MG/ML
INJECTION, SOLUTION EPIDURAL; INFILTRATION; INTRACAUDAL; PERINEURAL PRN
Status: DISCONTINUED | OUTPATIENT
Start: 2023-06-27 | End: 2023-06-27 | Stop reason: SDUPTHER

## 2023-06-27 RX ORDER — SODIUM CHLORIDE 0.9 % (FLUSH) 0.9 %
5-40 SYRINGE (ML) INJECTION PRN
Status: DISCONTINUED | OUTPATIENT
Start: 2023-06-27 | End: 2023-06-27 | Stop reason: HOSPADM

## 2023-06-27 RX ORDER — ASPIRIN 81 MG/1
81 TABLET, CHEWABLE ORAL DAILY
Status: DISCONTINUED | OUTPATIENT
Start: 2023-06-27 | End: 2023-06-28 | Stop reason: HOSPADM

## 2023-06-27 RX ORDER — METOPROLOL SUCCINATE 25 MG/1
25 TABLET, EXTENDED RELEASE ORAL DAILY
Status: DISCONTINUED | OUTPATIENT
Start: 2023-06-27 | End: 2023-06-28 | Stop reason: HOSPADM

## 2023-06-27 RX ORDER — SODIUM CHLORIDE 0.9 % (FLUSH) 0.9 %
5-40 SYRINGE (ML) INJECTION EVERY 12 HOURS SCHEDULED
Status: DISCONTINUED | OUTPATIENT
Start: 2023-06-27 | End: 2023-06-27 | Stop reason: HOSPADM

## 2023-06-27 RX ORDER — ROSUVASTATIN CALCIUM 10 MG/1
40 TABLET, COATED ORAL NIGHTLY
Status: DISCONTINUED | OUTPATIENT
Start: 2023-06-27 | End: 2023-06-28 | Stop reason: HOSPADM

## 2023-06-27 RX ADMIN — HEPARIN SODIUM 6000 UNITS: 1000 INJECTION INTRAVENOUS; SUBCUTANEOUS at 12:47

## 2023-06-27 RX ADMIN — LABETALOL HYDROCHLORIDE 5 MG: 5 INJECTION, SOLUTION INTRAVENOUS at 14:45

## 2023-06-27 RX ADMIN — SODIUM CHLORIDE 5 MG/HR: 9 INJECTION, SOLUTION INTRAVENOUS at 15:51

## 2023-06-27 RX ADMIN — Medication 120 MG: at 12:07

## 2023-06-27 RX ADMIN — PROPOFOL 40 MG: 10 INJECTION, EMULSION INTRAVENOUS at 13:42

## 2023-06-27 RX ADMIN — HYDROMORPHONE HYDROCHLORIDE 0.5 MG: 1 INJECTION, SOLUTION INTRAMUSCULAR; INTRAVENOUS; SUBCUTANEOUS at 21:48

## 2023-06-27 RX ADMIN — OXYCODONE HYDROCHLORIDE 10 MG: 10 TABLET ORAL at 15:32

## 2023-06-27 RX ADMIN — Medication 200 MCG: at 14:18

## 2023-06-27 RX ADMIN — ESMOLOL HYDROCHLORIDE 20 MG: 100 INJECTION, SOLUTION INTRAVENOUS at 13:45

## 2023-06-27 RX ADMIN — SODIUM CHLORIDE: 9 INJECTION, SOLUTION INTRAVENOUS at 12:15

## 2023-06-27 RX ADMIN — HYDROMORPHONE HYDROCHLORIDE 0.5 MG: 1 INJECTION, SOLUTION INTRAMUSCULAR; INTRAVENOUS; SUBCUTANEOUS at 14:42

## 2023-06-27 RX ADMIN — Medication 100 MCG: at 13:18

## 2023-06-27 RX ADMIN — OXYCODONE HYDROCHLORIDE 10 MG: 10 TABLET ORAL at 20:04

## 2023-06-27 RX ADMIN — ONDANSETRON 4 MG: 2 INJECTION INTRAMUSCULAR; INTRAVENOUS at 14:08

## 2023-06-27 RX ADMIN — MIDAZOLAM 2 MG: 1 INJECTION INTRAMUSCULAR; INTRAVENOUS at 11:58

## 2023-06-27 RX ADMIN — LABETALOL HYDROCHLORIDE 5 MG: 5 INJECTION, SOLUTION INTRAVENOUS at 14:40

## 2023-06-27 RX ADMIN — TICAGRELOR 90 MG: 90 TABLET ORAL at 20:07

## 2023-06-27 RX ADMIN — Medication 200 MCG: at 13:21

## 2023-06-27 RX ADMIN — PROPOFOL 40 MG: 10 INJECTION, EMULSION INTRAVENOUS at 13:53

## 2023-06-27 RX ADMIN — CEFAZOLIN 2000 MG: 2 INJECTION, POWDER, FOR SOLUTION INTRAMUSCULAR; INTRAVENOUS at 20:19

## 2023-06-27 RX ADMIN — HYDROMORPHONE HYDROCHLORIDE 0.5 MG: 1 INJECTION, SOLUTION INTRAMUSCULAR; INTRAVENOUS; SUBCUTANEOUS at 17:53

## 2023-06-27 RX ADMIN — FENTANYL CITRATE 100 MCG: 50 INJECTION INTRAMUSCULAR; INTRAVENOUS at 12:06

## 2023-06-27 RX ADMIN — Medication 100 MCG: at 12:39

## 2023-06-27 RX ADMIN — SODIUM CHLORIDE: 9 INJECTION, SOLUTION INTRAVENOUS at 11:37

## 2023-06-27 RX ADMIN — CEFAZOLIN 2000 MG: 2 INJECTION, POWDER, FOR SOLUTION INTRAMUSCULAR; INTRAVENOUS at 12:03

## 2023-06-27 RX ADMIN — ASPIRIN 81 MG 81 MG: 81 TABLET ORAL at 16:10

## 2023-06-27 RX ADMIN — PROPOFOL 20 MG: 10 INJECTION, EMULSION INTRAVENOUS at 13:19

## 2023-06-27 RX ADMIN — ROCURONIUM BROMIDE 5 MG: 10 INJECTION, SOLUTION INTRAVENOUS at 12:06

## 2023-06-27 RX ADMIN — SODIUM CHLORIDE: 9 INJECTION, SOLUTION INTRAVENOUS at 15:52

## 2023-06-27 RX ADMIN — SODIUM CHLORIDE 7.5 MG/HR: 9 INJECTION, SOLUTION INTRAVENOUS at 20:34

## 2023-06-27 RX ADMIN — SODIUM CHLORIDE: 9 INJECTION, SOLUTION INTRAVENOUS at 23:38

## 2023-06-27 RX ADMIN — ROSUVASTATIN 40 MG: 40 TABLET, FILM COATED ORAL at 20:07

## 2023-06-27 RX ADMIN — METOPROLOL SUCCINATE 25 MG: 25 TABLET, EXTENDED RELEASE ORAL at 18:31

## 2023-06-27 RX ADMIN — Medication 100 MCG: at 13:40

## 2023-06-27 RX ADMIN — LIDOCAINE HYDROCHLORIDE 100 MG: 20 INJECTION, SOLUTION EPIDURAL; INFILTRATION; INTRACAUDAL; PERINEURAL at 12:06

## 2023-06-27 RX ADMIN — NICARDIPINE HYDROCHLORIDE 5 MG/HR: 0.1 INJECTION, SOLUTION INTRAVENTRICULAR at 13:21

## 2023-06-27 RX ADMIN — FENTANYL CITRATE 50 MCG: 50 INJECTION INTRAMUSCULAR; INTRAVENOUS at 14:26

## 2023-06-27 RX ADMIN — PROPOFOL 100 MG: 10 INJECTION, EMULSION INTRAVENOUS at 12:06

## 2023-06-27 RX ADMIN — Medication 100 MCG: at 13:16

## 2023-06-27 RX ADMIN — SUGAMMADEX 200 MG: 100 INJECTION, SOLUTION INTRAVENOUS at 14:14

## 2023-06-27 RX ADMIN — PROPOFOL 20 MG: 10 INJECTION, EMULSION INTRAVENOUS at 13:17

## 2023-06-27 RX ADMIN — HYDROMORPHONE HYDROCHLORIDE 0.5 MG: 1 INJECTION, SOLUTION INTRAMUSCULAR; INTRAVENOUS; SUBCUTANEOUS at 14:29

## 2023-06-27 RX ADMIN — LABETALOL HYDROCHLORIDE 5 MG: 5 INJECTION, SOLUTION INTRAVENOUS at 14:20

## 2023-06-27 RX ADMIN — SODIUM CHLORIDE: 9 INJECTION, SOLUTION INTRAVENOUS at 13:48

## 2023-06-27 RX ADMIN — ROCURONIUM BROMIDE 45 MG: 10 INJECTION, SOLUTION INTRAVENOUS at 12:20

## 2023-06-27 RX ADMIN — FENTANYL CITRATE 50 MCG: 50 INJECTION INTRAMUSCULAR; INTRAVENOUS at 13:49

## 2023-06-27 RX ADMIN — LABETALOL HYDROCHLORIDE 5 MG: 5 INJECTION, SOLUTION INTRAVENOUS at 14:18

## 2023-06-27 RX ADMIN — DEXAMETHASONE SODIUM PHOSPHATE 10 MG: 4 INJECTION, SOLUTION INTRAMUSCULAR; INTRAVENOUS at 13:02

## 2023-06-27 ASSESSMENT — PAIN DESCRIPTION - ORIENTATION
ORIENTATION: LEFT;RIGHT
ORIENTATION: RIGHT;LEFT;MID
ORIENTATION: LOWER
ORIENTATION: RIGHT;LEFT
ORIENTATION: RIGHT;LEFT
ORIENTATION: MID;LOWER
ORIENTATION: RIGHT;LEFT
ORIENTATION: LOWER

## 2023-06-27 ASSESSMENT — PAIN SCALES - GENERAL
PAINLEVEL_OUTOF10: 10
PAINLEVEL_OUTOF10: 6
PAINLEVEL_OUTOF10: 7
PAINLEVEL_OUTOF10: 6
PAINLEVEL_OUTOF10: 10
PAINLEVEL_OUTOF10: 10
PAINLEVEL_OUTOF10: 7
PAINLEVEL_OUTOF10: 10
PAINLEVEL_OUTOF10: 7

## 2023-06-27 ASSESSMENT — PAIN - FUNCTIONAL ASSESSMENT
PAIN_FUNCTIONAL_ASSESSMENT: ACTIVITIES ARE NOT PREVENTED
PAIN_FUNCTIONAL_ASSESSMENT: 0-10
PAIN_FUNCTIONAL_ASSESSMENT: ACTIVITIES ARE NOT PREVENTED

## 2023-06-27 ASSESSMENT — PAIN DESCRIPTION - LOCATION
LOCATION: GROIN
LOCATION: BACK
LOCATION: HIP;GROIN;BACK
LOCATION: BACK;NECK
LOCATION: GROIN;HIP
LOCATION: GROIN;HIP;BACK
LOCATION: GROIN
LOCATION: BACK;NECK

## 2023-06-27 ASSESSMENT — PAIN DESCRIPTION - DESCRIPTORS
DESCRIPTORS: ACHING
DESCRIPTORS: SHARP
DESCRIPTORS: SHARP

## 2023-06-27 ASSESSMENT — PAIN DESCRIPTION - PAIN TYPE
TYPE: ACUTE PAIN
TYPE: ACUTE PAIN

## 2023-06-27 ASSESSMENT — PAIN DESCRIPTION - ONSET: ONSET: ON-GOING

## 2023-06-27 ASSESSMENT — PAIN DESCRIPTION - FREQUENCY: FREQUENCY: CONTINUOUS

## 2023-06-27 ASSESSMENT — LIFESTYLE VARIABLES: SMOKING_STATUS: 1

## 2023-06-27 ASSESSMENT — PAIN DESCRIPTION - DIRECTION: RADIATING_TOWARDS: BACK

## 2023-06-28 VITALS
DIASTOLIC BLOOD PRESSURE: 59 MMHG | HEART RATE: 71 BPM | RESPIRATION RATE: 16 BRPM | BODY MASS INDEX: 18.44 KG/M2 | TEMPERATURE: 99 F | HEIGHT: 66 IN | OXYGEN SATURATION: 99 % | SYSTOLIC BLOOD PRESSURE: 102 MMHG | WEIGHT: 114.75 LBS

## 2023-06-28 PROBLEM — G89.18 POST-OP PAIN: Status: ACTIVE | Noted: 2023-06-28

## 2023-06-28 LAB
ANION GAP SERPL CALCULATED.3IONS-SCNC: 10 MMOL/L (ref 3–16)
BUN SERPL-MCNC: 13 MG/DL (ref 7–20)
CALCIUM SERPL-MCNC: 7.5 MG/DL (ref 8.3–10.6)
CHLORIDE SERPL-SCNC: 103 MMOL/L (ref 99–110)
CO2 SERPL-SCNC: 21 MMOL/L (ref 21–32)
CREAT SERPL-MCNC: 0.8 MG/DL (ref 0.8–1.3)
DEPRECATED RDW RBC AUTO: 13.7 % (ref 12.4–15.4)
GFR SERPLBLD CREATININE-BSD FMLA CKD-EPI: >60 ML/MIN/{1.73_M2}
GLUCOSE SERPL-MCNC: 151 MG/DL (ref 70–99)
HCT VFR BLD AUTO: 30.4 % (ref 40.5–52.5)
HGB BLD-MCNC: 10.7 G/DL (ref 13.5–17.5)
MCH RBC QN AUTO: 33 PG (ref 26–34)
MCHC RBC AUTO-ENTMCNC: 35.2 G/DL (ref 31–36)
MCV RBC AUTO: 93.8 FL (ref 80–100)
PLATELET # BLD AUTO: 109 K/UL (ref 135–450)
PMV BLD AUTO: 8.8 FL (ref 5–10.5)
POTASSIUM SERPL-SCNC: 4 MMOL/L (ref 3.5–5.1)
RBC # BLD AUTO: 3.24 M/UL (ref 4.2–5.9)
SODIUM SERPL-SCNC: 134 MMOL/L (ref 136–145)
WBC # BLD AUTO: 4.3 K/UL (ref 4–11)

## 2023-06-28 PROCEDURE — 6360000002 HC RX W HCPCS: Performed by: STUDENT IN AN ORGANIZED HEALTH CARE EDUCATION/TRAINING PROGRAM

## 2023-06-28 PROCEDURE — 37799 UNLISTED PX VASCULAR SURGERY: CPT

## 2023-06-28 PROCEDURE — 85027 COMPLETE CBC AUTOMATED: CPT

## 2023-06-28 PROCEDURE — 94760 N-INVAS EAR/PLS OXIMETRY 1: CPT

## 2023-06-28 PROCEDURE — 6370000000 HC RX 637 (ALT 250 FOR IP): Performed by: STUDENT IN AN ORGANIZED HEALTH CARE EDUCATION/TRAINING PROGRAM

## 2023-06-28 PROCEDURE — 2580000003 HC RX 258: Performed by: STUDENT IN AN ORGANIZED HEALTH CARE EDUCATION/TRAINING PROGRAM

## 2023-06-28 PROCEDURE — 80048 BASIC METABOLIC PNL TOTAL CA: CPT

## 2023-06-28 RX ORDER — OXYCODONE HYDROCHLORIDE 5 MG/1
5 TABLET ORAL EVERY 6 HOURS PRN
Qty: 20 TABLET | Refills: 0 | Status: SHIPPED | OUTPATIENT
Start: 2023-06-28 | End: 2023-07-03

## 2023-06-28 RX ADMIN — TICAGRELOR 90 MG: 90 TABLET ORAL at 09:33

## 2023-06-28 RX ADMIN — CEFAZOLIN 2000 MG: 2 INJECTION, POWDER, FOR SOLUTION INTRAMUSCULAR; INTRAVENOUS at 03:56

## 2023-06-28 RX ADMIN — OXYCODONE HYDROCHLORIDE 10 MG: 10 TABLET ORAL at 02:11

## 2023-06-28 RX ADMIN — METOPROLOL SUCCINATE 25 MG: 25 TABLET, EXTENDED RELEASE ORAL at 09:33

## 2023-06-28 RX ADMIN — ASPIRIN 81 MG 81 MG: 81 TABLET ORAL at 09:33

## 2023-06-28 RX ADMIN — SODIUM CHLORIDE, PRESERVATIVE FREE 10 ML: 5 INJECTION INTRAVENOUS at 09:34

## 2023-06-28 ASSESSMENT — PAIN SCALES - GENERAL
PAINLEVEL_OUTOF10: 6
PAINLEVEL_OUTOF10: 5
PAINLEVEL_OUTOF10: 5
PAINLEVEL_OUTOF10: 7

## 2023-06-28 ASSESSMENT — PAIN DESCRIPTION - PAIN TYPE
TYPE: CHRONIC PAIN

## 2023-06-28 ASSESSMENT — PAIN DESCRIPTION - ORIENTATION
ORIENTATION: LOWER;MID
ORIENTATION: LOWER;MID
ORIENTATION: MID;LOWER
ORIENTATION: LOWER;MID

## 2023-06-28 ASSESSMENT — PAIN DESCRIPTION - LOCATION
LOCATION: BACK;NECK
LOCATION: BACK;NECK
LOCATION: BACK
LOCATION: BACK

## 2023-06-28 ASSESSMENT — PAIN DESCRIPTION - DESCRIPTORS
DESCRIPTORS: ACHING

## 2023-06-28 ASSESSMENT — PAIN DESCRIPTION - FREQUENCY
FREQUENCY: INTERMITTENT

## 2023-06-28 ASSESSMENT — PAIN DESCRIPTION - ONSET
ONSET: PROGRESSIVE

## 2023-06-28 ASSESSMENT — PAIN - FUNCTIONAL ASSESSMENT
PAIN_FUNCTIONAL_ASSESSMENT: ACTIVITIES ARE NOT PREVENTED

## 2023-06-28 ASSESSMENT — PAIN DESCRIPTION - DIRECTION
RADIATING_TOWARDS: BACK
RADIATING_TOWARDS: BACK

## 2023-06-29 ENCOUNTER — CARE COORDINATION (OUTPATIENT)
Dept: CASE MANAGEMENT | Age: 63
End: 2023-06-29

## 2023-06-29 DIAGNOSIS — G89.18 POST-OP PAIN: Primary | ICD-10-CM

## 2023-06-29 PROCEDURE — 1111F DSCHRG MED/CURRENT MED MERGE: CPT

## 2023-07-05 SDOH — HEALTH STABILITY: PHYSICAL HEALTH: ON AVERAGE, HOW MANY MINUTES DO YOU ENGAGE IN EXERCISE AT THIS LEVEL?: 20 MIN

## 2023-07-05 SDOH — HEALTH STABILITY: PHYSICAL HEALTH: ON AVERAGE, HOW MANY DAYS PER WEEK DO YOU ENGAGE IN MODERATE TO STRENUOUS EXERCISE (LIKE A BRISK WALK)?: 2 DAYS

## 2023-07-05 ASSESSMENT — SOCIAL DETERMINANTS OF HEALTH (SDOH)
WITHIN THE LAST YEAR, HAVE YOU BEEN AFRAID OF YOUR PARTNER OR EX-PARTNER?: NO
WITHIN THE LAST YEAR, HAVE TO BEEN RAPED OR FORCED TO HAVE ANY KIND OF SEXUAL ACTIVITY BY YOUR PARTNER OR EX-PARTNER?: NO
WITHIN THE LAST YEAR, HAVE YOU BEEN KICKED, HIT, SLAPPED, OR OTHERWISE PHYSICALLY HURT BY YOUR PARTNER OR EX-PARTNER?: NO
WITHIN THE LAST YEAR, HAVE YOU BEEN HUMILIATED OR EMOTIONALLY ABUSED IN OTHER WAYS BY YOUR PARTNER OR EX-PARTNER?: PATIENT DECLINED

## 2023-07-06 ENCOUNTER — OFFICE VISIT (OUTPATIENT)
Dept: ORTHOPEDIC SURGERY | Age: 63
End: 2023-07-06
Payer: MEDICARE

## 2023-07-06 VITALS — BODY MASS INDEX: 18.32 KG/M2 | HEIGHT: 66 IN | WEIGHT: 114 LBS

## 2023-07-06 DIAGNOSIS — M87.052 AVASCULAR NECROSIS OF BONE OF LEFT HIP (HCC): Primary | ICD-10-CM

## 2023-07-06 PROCEDURE — G8427 DOCREV CUR MEDS BY ELIG CLIN: HCPCS | Performed by: ORTHOPAEDIC SURGERY

## 2023-07-06 PROCEDURE — G8419 CALC BMI OUT NRM PARAM NOF/U: HCPCS | Performed by: ORTHOPAEDIC SURGERY

## 2023-07-06 PROCEDURE — 1123F ACP DISCUSS/DSCN MKR DOCD: CPT | Performed by: ORTHOPAEDIC SURGERY

## 2023-07-06 PROCEDURE — 99203 OFFICE O/P NEW LOW 30 MIN: CPT | Performed by: ORTHOPAEDIC SURGERY

## 2023-07-06 PROCEDURE — 4004F PT TOBACCO SCREEN RCVD TLK: CPT | Performed by: ORTHOPAEDIC SURGERY

## 2023-07-06 PROCEDURE — 1111F DSCHRG MED/CURRENT MED MERGE: CPT | Performed by: ORTHOPAEDIC SURGERY

## 2023-07-06 PROCEDURE — 3017F COLORECTAL CA SCREEN DOC REV: CPT | Performed by: ORTHOPAEDIC SURGERY

## 2023-07-06 NOTE — PROGRESS NOTES
[address] [facility]  Office Visit    Chief Complaint: Bilateral hip pain    HPI:  Noé Lara is a 70 y.o. who is here for initial assessment of bilateral hip pain. Pain has been worsening over the past 2 months. He reports pain in the groin on both sides. This is recently been exacerbated by aortic aneurysm repair as he has an incision in his right groin and a stab incision in the left groin that are currently healing. Symptoms were worse 2 months ago than they are now. He does have a history of low back pain and sciatica. Symptoms have been worse in the left hip. He denies a history of injury or surgery. He denies numbness, tingling, lumbar spine surgery. There are no specific aggravating factors. He is not in constant pain. He will have intermittent sharp pain. He is currently taking oxycodone from his recent surgery. He rates the pain as up to 7/10. He walks without assistive device.       Patient Active Problem List   Diagnosis    Chronic seasonal allergic rhinitis due to pollen    Tobacco abuse    Chronic nausea    DDD (degenerative disc disease), cervical    Chronic left-sided low back pain with left-sided sciatica    Major depression single episode, in partial remission ([facility])    AAA (abdominal aortic aneurysm) ([facility])    Mesenteric artery stenosis ([facility])    Iliac artery aneurysm, right ([facility])    Acute inferior myocardial infarction [facility])    Essential hypertension    History of acute inferior wall MI    Peripheral arterial disease ([facility])    Unstable angina ([facility])    Coronary artery disease involving native coronary artery of native heart without angina pectoris    Hyperlipidemia LDL goal <70    Abdominal aortic aneurysm (AAA) without rupture ([facility])    Post-op pain       ROS:  Constitutional: denies fever, chills, weight loss  MSK: denies pain in other joints, muscle aches  Neurological: denies numbness, tingling, weakness    Exam:  Height 5' 6\" (1.676 m), weight 114 lb (51.7

## 2023-07-07 ENCOUNTER — CARE COORDINATION (OUTPATIENT)
Dept: CASE MANAGEMENT | Age: 63
End: 2023-07-07

## 2023-07-07 NOTE — CARE COORDINATION
Care Transitions Outreach Attempt    Attempted to reach patient for transitions of care follow up. Unable to reach patient.     Patient: Estefania Fisher Patient : 1960 MRN: [id number]    Last Discharge [address] [facility] [facility]       Date Complaint Diagnosis Description Type Department Provider    23  Post-op pain Admission (Discharged) GARY Vallecillo DO          Noted following upcoming appointments from discharge chart review:   [facility]):   Future Appointments   Date Time Provider [address] [place] [address] Ct   7/10/2023  2:45 PM DO Ajit Saul [place] - DYD   2023  8:45 AM DO CRISTEL Merlos VASC/JIMMIE [facility]   2023  1:00 PM Angelica Weathers MD [facility] PARMINDER     Non-[facility] follow up appointment(s): NA

## 2023-07-10 ENCOUNTER — OFFICE VISIT (OUTPATIENT)
Dept: FAMILY MEDICINE CLINIC | Age: 63
End: 2023-07-10

## 2023-07-10 VITALS
WEIGHT: 117.8 LBS | DIASTOLIC BLOOD PRESSURE: 70 MMHG | BODY MASS INDEX: 18.93 KG/M2 | SYSTOLIC BLOOD PRESSURE: 110 MMHG | HEIGHT: 66 IN

## 2023-07-10 DIAGNOSIS — R10.31 RIGHT GROIN PAIN: Primary | ICD-10-CM

## 2023-07-10 DIAGNOSIS — Z98.890 STATUS POST AAA (ABDOMINAL AORTIC ANEURYSM) REPAIR: ICD-10-CM

## 2023-07-10 DIAGNOSIS — Z09 HOSPITAL DISCHARGE FOLLOW-UP: ICD-10-CM

## 2023-07-10 DIAGNOSIS — Z86.79 STATUS POST AAA (ABDOMINAL AORTIC ANEURYSM) REPAIR: ICD-10-CM

## 2023-07-13 ENCOUNTER — OFFICE VISIT (OUTPATIENT)
Dept: VASCULAR SURGERY | Age: 63
End: 2023-07-13

## 2023-07-13 VITALS — HEIGHT: 66 IN | BODY MASS INDEX: 19.01 KG/M2

## 2023-07-13 DIAGNOSIS — I71.40 ABDOMINAL AORTIC ANEURYSM (AAA), UNSPECIFIED PART, UNSPECIFIED WHETHER RUPTURED (HCC): Primary | ICD-10-CM

## 2023-07-13 DIAGNOSIS — Z95.828 HISTORY OF REPAIR OF ANEURYSM OF ABDOMINAL AORTA USING ENDOVASCULAR STENT GRAFT: ICD-10-CM

## 2023-07-13 DIAGNOSIS — Z09 POSTOP CHECK: ICD-10-CM

## 2023-07-13 PROCEDURE — 99024 POSTOP FOLLOW-UP VISIT: CPT | Performed by: STUDENT IN AN ORGANIZED HEALTH CARE EDUCATION/TRAINING PROGRAM

## 2023-07-13 NOTE — PROGRESS NOTES
Jenny Samayoa (:  1960) is a 70 y.o. male,Established patient, here for evaluation of the following chief complaint(s):  Post-Op Check         ASSESSMENT/PLAN:  1. Abdominal aortic aneurysm (AAA), unspecified part, unspecified whether ruptured [facility])  -     [address]; Future  -     Creatinine; Future  2. History of repair of aneurysm of abdominal aorta using endovascular stent graft  -     CTA ABDOMEN PELVIS W CONTRAST; Future  -     Creatinine; Future  3. Postop check    This is a 70year old male patient s/p EVAR. CTA to evaluate repair. Had small leak around proximal portion of graft that needs to be monitored. Discussed with patient and provided all information needed to arrange. All questions answered. Subjective   SUBJECTIVE/OBJECTIVE:  This is a 70year old male patient who is s/p EVAR with coil embolization of the right hypogastric and right femoral cutdown to repair the right femoral artery. Overall doing reasonably well. Had some pain post operatively but has managed. Denies needs right now. Wanting to get epidurals into his back. Objective   Physical Exam  Constitutional:       Appearance: Normal appearance. Cardiovascular:      Rate and Rhythm: Normal rate and regular rhythm. Pulses: Normal pulses. Pulmonary:      Effort: Pulmonary effort is normal. No respiratory distress. Abdominal:      Palpations: Abdomen is soft. Tenderness: There is no abdominal tenderness. Skin:     General: Skin is warm and dry. Comments: Right femoral incision and left femoral puncture site healing well   Neurological:      Mental Status: He is alert.           Rebecca Bryan, DO, FACS, FSVS, [address] [facility] [facility]  Vascular and Endovascular Surgery

## 2023-07-17 ENCOUNTER — HOSPITAL ENCOUNTER (OUTPATIENT)
Dept: CT IMAGING | Age: 63
Discharge: HOME | End: 2023-07-17
Attending: STUDENT IN AN ORGANIZED HEALTH CARE EDUCATION/TRAINING PROGRAM
Payer: MEDICARE

## 2023-07-17 ENCOUNTER — CARE COORDINATION (OUTPATIENT)
Dept: CASE MANAGEMENT | Age: 63
End: 2023-07-17

## 2023-07-17 DIAGNOSIS — Z95.828 HISTORY OF REPAIR OF ANEURYSM OF ABDOMINAL AORTA USING ENDOVASCULAR STENT GRAFT: ICD-10-CM

## 2023-07-17 DIAGNOSIS — I71.40 ABDOMINAL AORTIC ANEURYSM (AAA), UNSPECIFIED PART, UNSPECIFIED WHETHER RUPTURED (HCC): ICD-10-CM

## 2023-07-17 PROCEDURE — 74174 CTA ABD&PLVS W/CONTRAST: CPT

## 2023-07-17 PROCEDURE — 6360000004 HC RX CONTRAST MEDICATION: Performed by: STUDENT IN AN ORGANIZED HEALTH CARE EDUCATION/TRAINING PROGRAM

## 2023-07-17 RX ADMIN — IOPAMIDOL 75 ML: 755 INJECTION, SOLUTION INTRAVENOUS at 12:58

## 2023-07-17 NOTE — CARE COORDINATION
[facility] Care Transitions Follow Up Call    Patient Current Location: [address] [place] [address] Transition Nurse contacted the patient by telephone to follow up after admission. Verified name and  with patient as identifiers. Patient: Jose Lazaro  Patient : 1960   MRN: [id number]  Reason for Admission: AAA repair  Discharge Date: 23 RARS: Readmission Risk Score: 13.2      Needs to be reviewed by the provider   Additional needs identified to be addressed with provider: No  none             Method of communication with provider: none. States he's doing well. Denies any lightheadedness, dizziness, pain, SOB, or other symptoms or concerns. Reports his incision is healing and getting smaller every day. He has completed his f/u appts and completed his CT this morning. Reports good appetite. States he's taking all medications as prescribed and denies questions or concerns at this time. Addressed changes since last contact:  none  Discussed follow-up appointments. If no appointment was previously scheduled, appointment scheduling offered: Yes. Is follow up appointment scheduled within 7 days of discharge? Yes. Follow Up  Future Appointments   Date Time Provider [address]   2023  1:00 PM Kiesha Rodriguez MD [facility] [address] [place] [address] follow up appointment(s):      Care Transition Nurse reviewed discharge instructions, medical action plan, and red flags with patient and discussed any barriers to care and/or understanding of plan of care after discharge. Discussed appropriate site of care based on symptoms and resources available to patient including: PCP  Specialist  When to call Arielle Tovar. The patient agrees to contact the PCP office for questions related to their healthcare.        Patients top risk factors for readmission: medical condition-   Interventions to address risk factors: Obtained and reviewed discharge summary and/or continuity of care documents and Assessment and

## 2023-07-25 ENCOUNTER — CARE COORDINATION (OUTPATIENT)
Dept: CASE MANAGEMENT | Age: 63
End: 2023-07-25

## 2023-07-25 NOTE — CARE COORDINATION
Care Transitions Outreach Attempt    Attempted to reach patient for transitions of care follow up. Unable to reach patient.     Patient: Noemi Butler Patient : 1960 MRN: [id number]    Last Discharge [address]       Date Complaint Diagnosis Description Type Department Provider    23  Post-op pain Admission (Discharged) [address] [place], DO          Noted following upcoming appointments from discharge chart review:   [facility] follow up appointment(s):   Future Appointments   Date Time Provider [address] [place] [address]   2023  1:00 PM Josafat Mccollum MD [facility] CRISTEL ZURITA   2024  9:30 AM SCHEDULE, VASCULAR LAB 1 [place] VAS/JIMMIE ZURITA     Non-[facility] follow up appointment(s): NA

## 2023-07-27 ENCOUNTER — TELEPHONE (OUTPATIENT)
Dept: CARDIOLOGY CLINIC | Age: 63
End: 2023-07-27

## 2023-07-27 NOTE — TELEPHONE ENCOUNTER
CARDIAC CLEARANCE     What type of procedure are you having? Lumbar Procedure    Which physician is performing your procedure? Dr. Peyton Espinosa    When is your procedure scheduled for? TBD    Where are you having this procedure? [facility]    Are you taking Blood Thinners? If so what? (Name/dose/frequesncy)  Yes. ASA 81mg. 1 PO QD. Brilinta 90 mg.  1 PO BID. Does the surgeon want you to stop your blood thinner? If so for how long? No    Phone Number and Contact Name for Physicians office:  Kong Dumont [phone]    Fax number to send information:  [phone]    History:  past medical history significant for CAD s/p STEMI 6/2022, hypertension, hyperlipidemia, tobacco abuse, and AAA. He underwent a left heart catheretization 6/14/22 that resulted in x1 IOANA to the RCA. He presented to the ED 1/22/23 for chest pain similar to his prior STEMI. Scanned cardiac clearance request to PT's chart. Called and spoke to Joanna Rondon to confirm holding of blood thinners. Joanna stated PT does NOT have to hold either medication.

## 2023-07-27 NOTE — TELEPHONE ENCOUNTER
Per Dr. Dania Gonzalez, patient is cleared for procedure. Please notify and prepare letter if necessary. Thank you.

## 2023-07-28 ENCOUNTER — CARE COORDINATION (OUTPATIENT)
Dept: CASE MANAGEMENT | Age: 63
End: 2023-07-28

## 2023-07-28 NOTE — CARE COORDINATION
[facility] Care Transitions Follow Up Call    Patient Current Location:  Home: [facility]    Care Transition Nurse contacted the patient by telephone to follow up after admission on 2023. Verified name and  with patient as identifiers. Patient: Estefania Fisher  Patient : 1960   MRN: [id number]  Reason for Admission: Post op pain  Discharge Date: 23 RARS: Readmission Risk Score: 13.2      Needs to be reviewed by the provider   Additional needs identified to be addressed with provider: No               Method of communication with provider: none. Chang Wilkes states he is \"ok. \"  He states \"so far - so good. \" He is planning a procedure on his lower back. Informed Chang Wilkes this would be the final CTN outreach. He verbalized understanding. Follow Up  Future Appointments   Date Time Provider [address]   2023  1:00 PM Angelica Weathers MD [facility] PARMINDER   2024  9:30 AM SCHEDULE, VASCULAR LAB 1 [facility]/JIMMIE [facility]          Care Transitions Subsequent and Final Call    Subsequent and Final Calls  Care Transitions Interventions    Social Work: Completed    Other Interventions:             Care Transition Nurse provided contact information for future needs. No further follow-up call indicated based on severity of symptoms and risk factors.     Chun Carlton RN BSN  Care Transition Nurse  [phone]

## 2023-08-02 ENCOUNTER — TELEPHONE (OUTPATIENT)
Dept: CASE MANAGEMENT | Age: 63
End: 2023-08-02

## 2023-08-02 NOTE — TELEPHONE ENCOUNTER
First Lung Cancer Screening Recommendation Reminder Letter mailed to patient. Most recent smoking history reviewed and ALA smoking cessation [address] class information mailed to patient.

## 2023-10-09 ENCOUNTER — TELEPHONE (OUTPATIENT)
Dept: FAMILY MEDICINE CLINIC | Age: 63
End: 2023-10-09

## 2023-10-09 NOTE — TELEPHONE ENCOUNTER
[place] with [facility] called to inform Dr. Mendoza Louis of a phone encounter she had with the pt. She stated that they started off on the wrong foot, and as she tried to calm him down, it was brought up that the pt uses marijuana to help with his anxiety. [place] then started to explain to the pt that Dr. Phil Hernandez, nor any other provider in their practice, will prescribe pain medication while the pt is using marijuana. She stated that he became very hostile and started saying things like, \"Nobody care. Nobody wants to help. Dr. Mendoza Louis doesn't do anything but refer me to other people. I have to stop smoking in order to help my pain? What happens to with my anxiety? Do you people want me to just roll over in a ditch?\". Hannah berry stated she tried to explain to the pt that if he discontinues the marijuana, Dr. Melody Marshall can prescribe pain medication, and the pt can discuss medication to help with his anxiety. He refused to discontinue the marijuana. Pt also requested that he get injections to help with his pain, but wanted all of the injections at one appointment. Hannah berry explained that it would not be possible to do that, and she stated the pt became hostile. [place] b[place] wants to make it clear that although she endured a 27 minute, hostile phone call with the pt, they did not decline seeing him. They would be happy to see him, if he is no longer in use of marijuana. She also wanted to let us know that he may call back to our office looking for a different referral for his pain management.

## 2023-10-10 ENCOUNTER — OFFICE VISIT (OUTPATIENT)
Dept: CARDIOLOGY CLINIC | Age: 63
End: 2023-10-10
Payer: MEDICARE

## 2023-10-10 VITALS
HEART RATE: 104 BPM | SYSTOLIC BLOOD PRESSURE: 136 MMHG | HEIGHT: 66 IN | OXYGEN SATURATION: 97 % | DIASTOLIC BLOOD PRESSURE: 86 MMHG | BODY MASS INDEX: 18.8 KG/M2 | WEIGHT: 117 LBS

## 2023-10-10 DIAGNOSIS — Z98.890 S/P AAA REPAIR: ICD-10-CM

## 2023-10-10 DIAGNOSIS — I25.10 CORONARY ARTERY DISEASE INVOLVING NATIVE CORONARY ARTERY OF NATIVE HEART WITHOUT ANGINA PECTORIS: Primary | ICD-10-CM

## 2023-10-10 DIAGNOSIS — E78.5 HYPERLIPIDEMIA LDL GOAL <70: ICD-10-CM

## 2023-10-10 DIAGNOSIS — I71.40 ABDOMINAL AORTIC ANEURYSM (AAA) WITHOUT RUPTURE, UNSPECIFIED PART (HCC): ICD-10-CM

## 2023-10-10 DIAGNOSIS — I73.9 PERIPHERAL ARTERIAL DISEASE (HCC): ICD-10-CM

## 2023-10-10 DIAGNOSIS — Z86.79 S/P AAA REPAIR: ICD-10-CM

## 2023-10-10 DIAGNOSIS — Z72.0 TOBACCO ABUSE: ICD-10-CM

## 2023-10-10 DIAGNOSIS — I10 ESSENTIAL HYPERTENSION: ICD-10-CM

## 2023-10-10 PROBLEM — D69.6 THROMBOCYTOPENIA, UNSPECIFIED (HCC): Status: RESOLVED | Noted: 2023-10-10 | Resolved: 2023-10-10

## 2023-10-10 PROBLEM — D69.6 THROMBOCYTOPENIA, UNSPECIFIED (HCC): Status: ACTIVE | Noted: 2023-10-10

## 2023-10-10 PROBLEM — M51.369 DDD (DEGENERATIVE DISC DISEASE), LUMBAR: Status: ACTIVE | Noted: 2023-10-10

## 2023-10-10 PROBLEM — M51.36 DDD (DEGENERATIVE DISC DISEASE), LUMBAR: Status: ACTIVE | Noted: 2023-10-10

## 2023-10-10 LAB
CHOLEST SERPL-MCNC: 144 MG/DL (ref 0–199)
HDLC SERPL-MCNC: 50 MG/DL (ref 40–60)
LDLC SERPL CALC-MCNC: 74 MG/DL
TRIGL SERPL-MCNC: 101 MG/DL (ref 0–150)
VLDLC SERPL CALC-MCNC: 20 MG/DL

## 2023-10-10 PROCEDURE — 3075F SYST BP GE 130 - 139MM HG: CPT | Performed by: INTERNAL MEDICINE

## 2023-10-10 PROCEDURE — 4004F PT TOBACCO SCREEN RCVD TLK: CPT | Performed by: INTERNAL MEDICINE

## 2023-10-10 PROCEDURE — 3017F COLORECTAL CA SCREEN DOC REV: CPT | Performed by: INTERNAL MEDICINE

## 2023-10-10 PROCEDURE — G8427 DOCREV CUR MEDS BY ELIG CLIN: HCPCS | Performed by: INTERNAL MEDICINE

## 2023-10-10 PROCEDURE — 99214 OFFICE O/P EST MOD 30 MIN: CPT | Performed by: INTERNAL MEDICINE

## 2023-10-10 PROCEDURE — G8484 FLU IMMUNIZE NO ADMIN: HCPCS | Performed by: INTERNAL MEDICINE

## 2023-10-10 PROCEDURE — G8420 CALC BMI NORM PARAMETERS: HCPCS | Performed by: INTERNAL MEDICINE

## 2023-10-10 PROCEDURE — 3079F DIAST BP 80-89 MM HG: CPT | Performed by: INTERNAL MEDICINE

## 2023-10-10 PROCEDURE — 1123F ACP DISCUSS/DSCN MKR DOCD: CPT | Performed by: INTERNAL MEDICINE

## 2023-10-10 ASSESSMENT — ENCOUNTER SYMPTOMS: BACK PAIN: 1

## 2023-10-10 NOTE — PROGRESS NOTES
risk study. OPERATION PERFORMED:6/27/2023 [facility]  1. Ultrasound-guided access to bilateral common femoral arteries. 2.  Percutaneous access and closure of the left common femoral artery  for delivery of a large 16-[place] sheath. 3.  Right femoral artery cut down and repair of right femoral artery  with delivery of 12-[place] sheath. 4.  Exclusion of abdominal aortic aneurysm using a [place] conformable  Excluder AAA endoprosthesis measuring 26 mm proximally with 2 docking  limbs, 16 mm on the left and 12 mm on the right. 5.  Coil embolization of the right hypogastric artery with extension of  the iliac limb into right external iliac artery. Lab Review:   Lab Results   Component Value Date/Time    TRIG 92 06/15/2022 04:40 AM    HDL 34 06/15/2022 04:40 AM    LDLCALC 100 06/15/2022 04:40 AM    LABVLDL 18 06/15/2022 04:40 AM     Lab Results   Component Value Date/Time     06/28/2023 04:11 AM    K 4.0 06/28/2023 04:11 AM    K 3.8 04/20/2023 05:30 PM    BUN 13 06/28/2023 04:11 AM    CREATININE 0.8 06/28/2023 04:11 AM     No results for input(s): \"WBC\", \"HGB\", \"HCT\", \"PLT\" in the last 72 hours. Assessment:  1. CAD of native coronary arteries, s/p STEMI  2. Hyperlipidemia with goal <70mg/dL  3. AAA. 4.5 cm (6/2022), s/p repair 6/2023  4. Essential hypertension  5. Tobacco abuse   6. Bilateral leg pain/cramping       Plan:  I think that Joo Tejeda appears well from a cardiovascular standpoint. He is s/p AAA repair with Dr. Veronica Estrada in June. I will have him repeat a lipid profile with AST/ALT today. If LDL is not controlled on high intensity statin, I will add Zetia 10 mg daily. Since we are 1 year out from coronary artery stenting, Brilinta may be discontinued. His blood pressure is stable. I have advised him to inquire on a referral for pain specialist from Dr. Kenan Lynn. I have personally reviewed all previous testing for this visit today including imaging, lab results and EKG as detailed above.      I

## 2023-10-11 ENCOUNTER — OFFICE VISIT (OUTPATIENT)
Dept: FAMILY MEDICINE CLINIC | Age: 63
End: 2023-10-11

## 2023-10-11 VITALS
BODY MASS INDEX: 18.99 KG/M2 | HEIGHT: 66 IN | SYSTOLIC BLOOD PRESSURE: 110 MMHG | DIASTOLIC BLOOD PRESSURE: 80 MMHG | WEIGHT: 118.2 LBS

## 2023-10-11 DIAGNOSIS — G89.29 CHRONIC LEFT-SIDED LOW BACK PAIN WITH LEFT-SIDED SCIATICA: Primary | ICD-10-CM

## 2023-10-11 DIAGNOSIS — M54.42 CHRONIC LEFT-SIDED LOW BACK PAIN WITH LEFT-SIDED SCIATICA: Primary | ICD-10-CM

## 2023-10-11 DIAGNOSIS — Z23 NEED FOR INFLUENZA VACCINATION: ICD-10-CM

## 2023-10-11 DIAGNOSIS — M51.36 DDD (DEGENERATIVE DISC DISEASE), LUMBAR: ICD-10-CM

## 2023-10-11 DIAGNOSIS — M50.30 DDD (DEGENERATIVE DISC DISEASE), CERVICAL: ICD-10-CM

## 2023-10-11 DIAGNOSIS — Z23 NEED FOR PNEUMOCOCCAL 20-VALENT CONJUGATE VACCINATION: ICD-10-CM

## 2023-10-11 DIAGNOSIS — M54.6 CHRONIC BILATERAL THORACIC BACK PAIN: ICD-10-CM

## 2023-10-11 DIAGNOSIS — G89.29 CHRONIC BILATERAL THORACIC BACK PAIN: ICD-10-CM

## 2023-10-11 RX ORDER — ROSUVASTATIN CALCIUM 40 MG/1
40 TABLET, COATED ORAL
Qty: 90 TABLET | Refills: 3 | Status: SHIPPED | OUTPATIENT
Start: 2023-10-11

## 2023-10-11 RX ORDER — METOPROLOL SUCCINATE 25 MG/1
25 TABLET, EXTENDED RELEASE ORAL DAILY
Qty: 90 TABLET | Refills: 3 | Status: SHIPPED | OUTPATIENT
Start: 2023-10-11

## 2023-10-11 NOTE — TELEPHONE ENCOUNTER
Last OV: 10/10/23  Next OV: X due yearly  Last refill: 9/9/22  Most recent Labs: 10/10/23  Lipid  Last EKG (if needed): 6/27/23

## 2023-11-22 ENCOUNTER — HOSPITAL ENCOUNTER (OUTPATIENT)
Dept: MRI IMAGING | Age: 63
Discharge: HOME | End: 2023-11-22
Attending: FAMILY MEDICINE
Payer: MEDICARE

## 2023-11-22 DIAGNOSIS — G89.29 CHRONIC BILATERAL THORACIC BACK PAIN: ICD-10-CM

## 2023-11-22 DIAGNOSIS — M54.6 CHRONIC BILATERAL THORACIC BACK PAIN: ICD-10-CM

## 2023-11-22 PROCEDURE — 72146 MRI CHEST SPINE W/O DYE: CPT

## 2023-12-08 ENCOUNTER — HOSPITAL ENCOUNTER (OUTPATIENT)
Dept: CT IMAGING | Age: 63
Discharge: HOME | End: 2023-12-08
Payer: MEDICARE

## 2023-12-08 DIAGNOSIS — F17.210 MODERATE SMOKER (20 OR LESS PER DAY): ICD-10-CM

## 2023-12-08 PROCEDURE — 71271 CT THORAX LUNG CANCER SCR C-: CPT

## 2024-01-10 ENCOUNTER — HOSPITAL ENCOUNTER (OUTPATIENT)
Dept: CT IMAGING | Age: 64
Discharge: HOME | End: 2024-01-10
Payer: MEDICARE

## 2024-01-10 DIAGNOSIS — H93.A2 PULSATILE TINNITUS, LEFT EAR: ICD-10-CM

## 2024-01-10 LAB
PERFORMED ON: NORMAL
POC CREATININE: 0.9 MG/DL (ref 0.8–1.3)
POC SAMPLE TYPE: NORMAL

## 2024-01-10 PROCEDURE — 82565 ASSAY OF CREATININE: CPT

## 2024-01-10 PROCEDURE — 70470 CT HEAD/BRAIN W/O & W/DYE: CPT

## 2024-01-10 PROCEDURE — 6360000004 HC RX CONTRAST MEDICATION

## 2024-01-10 RX ADMIN — IOPAMIDOL 75 ML: 755 INJECTION, SOLUTION INTRAVENOUS at 09:31

## 2024-01-16 ENCOUNTER — PROCEDURE VISIT (OUTPATIENT)
Dept: SURGERY | Age: 64
End: 2024-01-16
Payer: MEDICARE

## 2024-01-16 ENCOUNTER — OFFICE VISIT (OUTPATIENT)
Dept: VASCULAR SURGERY | Age: 64
End: 2024-01-16
Payer: MEDICARE

## 2024-01-16 VITALS — WEIGHT: 127 LBS | SYSTOLIC BLOOD PRESSURE: 128 MMHG | DIASTOLIC BLOOD PRESSURE: 80 MMHG | BODY MASS INDEX: 20.5 KG/M2

## 2024-01-16 DIAGNOSIS — I71.43 ANEURYSM OF INFRARENAL ABDOMINAL AORTA, UNSPECIFIED WHETHER RUPTURED (HCC): ICD-10-CM

## 2024-01-16 DIAGNOSIS — I72.3 ILIAC ARTERY ANEURYSM, RIGHT (HCC): ICD-10-CM

## 2024-01-16 DIAGNOSIS — Z72.0 TOBACCO ABUSE: ICD-10-CM

## 2024-01-16 DIAGNOSIS — K55.1 MESENTERIC ARTERY STENOSIS (HCC): ICD-10-CM

## 2024-01-16 DIAGNOSIS — I25.2 HISTORY OF ACUTE INFERIOR WALL MI: Primary | ICD-10-CM

## 2024-01-16 DIAGNOSIS — E78.5 HYPERLIPIDEMIA LDL GOAL <70: ICD-10-CM

## 2024-01-16 DIAGNOSIS — I71.43 INFRARENAL ABDOMINAL AORTIC ANEURYSM (AAA) WITHOUT RUPTURE (HCC): Primary | ICD-10-CM

## 2024-01-16 PROCEDURE — G8484 FLU IMMUNIZE NO ADMIN: HCPCS | Performed by: SURGERY

## 2024-01-16 PROCEDURE — 3074F SYST BP LT 130 MM HG: CPT | Performed by: SURGERY

## 2024-01-16 PROCEDURE — 3079F DIAST BP 80-89 MM HG: CPT | Performed by: SURGERY

## 2024-01-16 PROCEDURE — 1123F ACP DISCUSS/DSCN MKR DOCD: CPT | Performed by: SURGERY

## 2024-01-16 PROCEDURE — 93978 VASCULAR STUDY: CPT | Performed by: SURGERY

## 2024-01-16 PROCEDURE — G8420 CALC BMI NORM PARAMETERS: HCPCS | Performed by: SURGERY

## 2024-01-16 PROCEDURE — G8427 DOCREV CUR MEDS BY ELIG CLIN: HCPCS | Performed by: SURGERY

## 2024-01-16 PROCEDURE — 3017F COLORECTAL CA SCREEN DOC REV: CPT | Performed by: SURGERY

## 2024-01-16 PROCEDURE — 99214 OFFICE O/P EST MOD 30 MIN: CPT | Performed by: SURGERY

## 2024-01-16 PROCEDURE — 4004F PT TOBACCO SCREEN RCVD TLK: CPT | Performed by: SURGERY

## 2024-01-16 ASSESSMENT — ENCOUNTER SYMPTOMS: BACK PAIN: 1

## 2024-01-16 NOTE — PROGRESS NOTES
Exam  Constitutional:       General: He is not in acute distress.     Appearance: Normal appearance.   HENT:      Head: Normocephalic.      Right Ear: Hearing normal.      Left Ear: Hearing normal.   Eyes:      General: Lids are normal.      Conjunctiva/sclera: Conjunctivae normal.   Neck:      Vascular: No carotid bruit.      Trachea: Trachea normal. No tracheal deviation.   Cardiovascular:      Rate and Rhythm: Normal rate and regular rhythm.      Pulses:           Carotid pulses are 2+ on the right side and 2+ on the left side.       Radial pulses are 2+ on the right side and 2+ on the left side.        Femoral pulses are 2+ on the right side with bruit and 2+ on the left side with bruit.       Popliteal pulses are 1+ on the right side and 2+ on the left side.        Dorsalis pedis pulses are 2+ on the right side and 2+ on the left side.        Posterior tibial pulses are 2+ on the right side and 2+ on the left side.      Heart sounds: Normal heart sounds.      Comments:   Dopplers 2/23/23  Right DP:  Biphasic  Right PT:  Biphasic    Left DP:  Biphasic  Left PT:  Biphasic  Pulmonary:      Effort: Pulmonary effort is normal.      Breath sounds: Normal breath sounds.   Abdominal:      Palpations: There is no mass. Pulsatile midline mass: AAA measures 4.84 cm x 4.84 cm per vascular scan today; right common iliac artery measures 2.26 cm.     Tenderness: There is no abdominal tenderness. There is no guarding or rebound.   Musculoskeletal:         General: Normal range of motion.      Cervical back: Normal range of motion and neck supple.   Skin:     General: Skin is warm and dry.   Neurological:      Mental Status: He is alert and oriented to person, place, and time.   Psychiatric:         Judgment: Judgment normal.           ASSESSMENT:    Problem List Items Addressed This Visit          Medium    History of acute inferior wall MI - Primary       Unprioritized    AAA (abdominal aortic aneurysm) (HCC)

## 2024-01-27 ENCOUNTER — HOSPITAL ENCOUNTER (OUTPATIENT)
Dept: MRI IMAGING | Age: 64
Discharge: HOME | End: 2024-01-27
Payer: MEDICARE

## 2024-01-27 DIAGNOSIS — H93.A2 PULSATILE TINNITUS OF LEFT EAR: ICD-10-CM

## 2024-01-27 PROCEDURE — 70544 MR ANGIOGRAPHY HEAD W/O DYE: CPT

## 2024-02-23 PROBLEM — I25.2 HISTORY OF ACUTE MYOCARDIAL INFARCTION: Status: ACTIVE | Noted: 2022-06-14

## 2024-07-05 ENCOUNTER — TELEPHONE (OUTPATIENT)
Dept: VASCULAR SURGERY | Age: 64
End: 2024-07-05

## 2024-07-05 NOTE — TELEPHONE ENCOUNTER
Attempted to reach patient to reschedule his upcoming appointment with David Ngo NP.  His appt is on 7/25 in the morning and David's clinics are now in the afternoon. Called the number on file and there was no voicemail to leave a message. Will call back.

## 2024-07-23 DIAGNOSIS — I71.43 ANEURYSM OF INFRARENAL ABDOMINAL AORTA, UNSPECIFIED WHETHER RUPTURED (HCC): Primary | ICD-10-CM

## 2024-08-02 ENCOUNTER — OFFICE VISIT (OUTPATIENT)
Dept: VASCULAR SURGERY | Age: 64
End: 2024-08-02
Payer: MEDICARE

## 2024-08-02 VITALS
WEIGHT: 132 LBS | BODY MASS INDEX: 21.21 KG/M2 | SYSTOLIC BLOOD PRESSURE: 122 MMHG | RESPIRATION RATE: 17 BRPM | DIASTOLIC BLOOD PRESSURE: 78 MMHG | HEIGHT: 66 IN

## 2024-08-02 DIAGNOSIS — I72.3 ILIAC ARTERY ANEURYSM, RIGHT (HCC): ICD-10-CM

## 2024-08-02 DIAGNOSIS — Z72.0 TOBACCO ABUSE: ICD-10-CM

## 2024-08-02 DIAGNOSIS — I77.1 CELIAC ARTERY STENOSIS (HCC): ICD-10-CM

## 2024-08-02 DIAGNOSIS — K55.1 MESENTERIC ARTERY STENOSIS (HCC): ICD-10-CM

## 2024-08-02 DIAGNOSIS — I71.43 INFRARENAL ABDOMINAL AORTIC ANEURYSM (AAA) WITHOUT RUPTURE (HCC): Primary | ICD-10-CM

## 2024-08-02 PROCEDURE — 99214 OFFICE O/P EST MOD 30 MIN: CPT | Performed by: NURSE PRACTITIONER

## 2024-08-02 PROCEDURE — 1123F ACP DISCUSS/DSCN MKR DOCD: CPT | Performed by: NURSE PRACTITIONER

## 2024-08-02 PROCEDURE — 3074F SYST BP LT 130 MM HG: CPT | Performed by: NURSE PRACTITIONER

## 2024-08-02 PROCEDURE — 4004F PT TOBACCO SCREEN RCVD TLK: CPT | Performed by: NURSE PRACTITIONER

## 2024-08-02 PROCEDURE — G8420 CALC BMI NORM PARAMETERS: HCPCS | Performed by: NURSE PRACTITIONER

## 2024-08-02 PROCEDURE — G8427 DOCREV CUR MEDS BY ELIG CLIN: HCPCS | Performed by: NURSE PRACTITIONER

## 2024-08-02 PROCEDURE — 3078F DIAST BP <80 MM HG: CPT | Performed by: NURSE PRACTITIONER

## 2024-08-02 PROCEDURE — 3017F COLORECTAL CA SCREEN DOC REV: CPT | Performed by: NURSE PRACTITIONER

## 2024-08-02 NOTE — PATIENT INSTRUCTIONS
Return in about 1 year (around 8/2/2025) for AAA scan and office visit.    PATIENT EDUCATION focused on A&P of aneurysms and strong familial incidence.  Patient was informed that smoking and high blood pressure can affect aneurysms.  Smoking can thin vessels and high blood pressure results in too much pressure inside the vessel, which can potentially stretch it further.  I explained that it is important to make family members aware of the influence of genetic factors in the development of an aneurysm.

## 2024-08-03 PROBLEM — I77.1 CELIAC ARTERY STENOSIS (HCC): Status: ACTIVE | Noted: 2024-08-03

## 2024-08-03 PROBLEM — I77.4 CELIAC ARTERY STENOSIS (HCC): Status: ACTIVE | Noted: 2024-08-03

## 2024-08-03 ASSESSMENT — ENCOUNTER SYMPTOMS: BACK PAIN: 1

## 2024-08-03 NOTE — PROGRESS NOTES
Yes Immanuel Chadwick MD   aspirin 81 MG chewable tablet Take 1 tablet by mouth daily Yes Immanuel Chadwick MD   nitroGLYCERIN (NITROSTAT) 0.4 MG SL tablet Place 1 tablet under the tongue every 5 minutes as needed for Chest pain Yes Joanna Mcnamara, APRN - CNP       History reviewed.      Physical Exam  Constitutional:       General: He is not in acute distress.     Appearance: Normal appearance.   HENT:      Head: Normocephalic.      Right Ear: Hearing normal.      Left Ear: Hearing normal.   Eyes:      General: Lids are normal.      Conjunctiva/sclera: Conjunctivae normal.   Neck:      Vascular: No carotid bruit.      Trachea: Trachea normal. No tracheal deviation.   Cardiovascular:      Rate and Rhythm: Normal rate and regular rhythm.      Pulses:           Carotid pulses are 2+ on the right side and 2+ on the left side.       Radial pulses are 2+ on the right side and 2+ on the left side.        Femoral pulses are 2+ on the right side with bruit and 2+ on the left side with bruit.       Popliteal pulses are 1+ on the right side and 2+ on the left side.        Dorsalis pedis pulses are 2+ on the right side and 2+ on the left side.        Posterior tibial pulses are 2+ on the right side and 2+ on the left side.      Heart sounds: Normal heart sounds.   Pulmonary:      Effort: Pulmonary effort is normal.      Breath sounds: Normal breath sounds.   Abdominal:      Palpations: There is no mass or pulsatile mass.      Tenderness: There is no abdominal tenderness. There is no guarding or rebound.      Comments:   Excluded aortic aneurysm measures 4.33 x 3.91 cm  Excluded right ROBERTO aneurysm measures 2.11 x 1.80 cm   Musculoskeletal:         General: Normal range of motion.      Cervical back: Normal range of motion and neck supple.   Skin:     General: Skin is warm and dry.   Neurological:      Mental Status: He is alert and oriented to person, place, and time.   Psychiatric:         Judgment: Judgment

## 2024-10-10 RX ORDER — ROSUVASTATIN CALCIUM 40 MG/1
40 TABLET, COATED ORAL DAILY
Qty: 90 TABLET | Refills: 3 | Status: SHIPPED | OUTPATIENT
Start: 2024-10-10

## 2024-10-10 RX ORDER — METOPROLOL SUCCINATE 25 MG/1
25 TABLET, EXTENDED RELEASE ORAL DAILY
Qty: 90 TABLET | Refills: 3 | Status: SHIPPED | OUTPATIENT
Start: 2024-10-10

## 2024-10-10 NOTE — TELEPHONE ENCOUNTER
Last OV: 10/10/2023  Last Labs:  2023, (Lipid) 06/15/2022  Last Refills: 10/11/2023  Next Appt: 2024  Last EK2023

## 2024-11-17 ENCOUNTER — TELEPHONE (OUTPATIENT)
Dept: CASE MANAGEMENT | Age: 64
End: 2024-11-17

## 2024-11-18 NOTE — PROGRESS NOTES
Types: Cigarettes     Start date: 1970    Smokeless tobacco: Never    Tobacco comments:     Updated smoking history from telephone summary information with [email or web address] on 12/202/2023 seen in Care Everywhere.   Vaping Use    Vaping status: Former    Substances: Nicotine, Flavoring   Substance Use Topics    Alcohol use: Yes     Comment: Social     Drug use: Yes     Types: Marijuana (Weed)     Comment: over 40 years ago did many drugs-only oral drugs       Allergies   Allergen Reactions    Codeine Nausea Only    Wellbutrin [Bupropion] Other (See Comments)     Insomnia      Current Outpatient Medications   Medication Sig Dispense Refill    nitroGLYCERIN (NITROSTAT) 0.4 MG SL tablet Place 1 tablet under the tongue every 5 minutes as needed for Chest pain 25 tablet 3    metoprolol succinate (TOPROL XL) 25 MG extended release tablet Take 1 tablet by mouth daily 90 tablet 3    rosuvastatin (CRESTOR) 40 MG tablet Take 1 tablet by mouth daily 90 tablet 3    aspirin 81 MG chewable tablet Take 1 tablet by mouth daily 90 tablet 3     No current facility-administered medications for this visit.       Physical Exam:   /82 (Site: Right Upper Arm, Position: Sitting, Cuff Size: Large Adult)   Pulse 67   Resp 15   Ht 1.676 m (5' 6\")   Wt 59.3 kg (130 lb 12.8 oz)   SpO2 98%   BMI 21.11 kg/m²   No intake or output data in the 24 hours ending 11/19/24 1244  Wt Readings from Last 2 Encounters:   11/19/24 59.3 kg (130 lb 12.8 oz)   08/02/24 59.9 kg (132 lb)     Constitutional: He is oriented to person, place, and time. He appears well-developed and well-nourished. In no acute distress.   Head: Normocephalic and atraumatic.     Neck: Neck supple. No JVD present. Carotid bruit is not present. No mass and no thyromegaly present. No lymphadenopathy present.  Cardiovascular: Normal rate, regular rhythm, normal heart sounds and intact distal pulses.  Exam reveals no gallop and no friction rub.  No murmur heard.  Pulmonary/Chest:

## 2024-11-19 ENCOUNTER — OFFICE VISIT (OUTPATIENT)
Dept: CARDIOLOGY CLINIC | Age: 64
End: 2024-11-19
Payer: MEDICARE

## 2024-11-19 VITALS
HEIGHT: 66 IN | OXYGEN SATURATION: 98 % | HEART RATE: 67 BPM | SYSTOLIC BLOOD PRESSURE: 130 MMHG | BODY MASS INDEX: 21.02 KG/M2 | WEIGHT: 130.8 LBS | RESPIRATION RATE: 15 BRPM | DIASTOLIC BLOOD PRESSURE: 82 MMHG

## 2024-11-19 DIAGNOSIS — I73.9 PERIPHERAL ARTERIAL DISEASE (HCC): ICD-10-CM

## 2024-11-19 DIAGNOSIS — E78.5 HYPERLIPIDEMIA LDL GOAL <70: ICD-10-CM

## 2024-11-19 DIAGNOSIS — I25.10 CORONARY ARTERY DISEASE INVOLVING NATIVE CORONARY ARTERY OF NATIVE HEART WITHOUT ANGINA PECTORIS: Primary | ICD-10-CM

## 2024-11-19 DIAGNOSIS — I10 ESSENTIAL HYPERTENSION: ICD-10-CM

## 2024-11-19 DIAGNOSIS — Z72.0 TOBACCO ABUSE: ICD-10-CM

## 2024-11-19 DIAGNOSIS — I71.40 ABDOMINAL AORTIC ANEURYSM (AAA) WITHOUT RUPTURE, UNSPECIFIED PART (HCC): ICD-10-CM

## 2024-11-19 DIAGNOSIS — Z98.890 S/P AAA REPAIR: ICD-10-CM

## 2024-11-19 DIAGNOSIS — Z86.79 S/P AAA REPAIR: ICD-10-CM

## 2024-11-19 PROCEDURE — G8427 DOCREV CUR MEDS BY ELIG CLIN: HCPCS | Performed by: INTERNAL MEDICINE

## 2024-11-19 PROCEDURE — 3075F SYST BP GE 130 - 139MM HG: CPT | Performed by: INTERNAL MEDICINE

## 2024-11-19 PROCEDURE — 3017F COLORECTAL CA SCREEN DOC REV: CPT | Performed by: INTERNAL MEDICINE

## 2024-11-19 PROCEDURE — 1159F MED LIST DOCD IN RCRD: CPT | Performed by: INTERNAL MEDICINE

## 2024-11-19 PROCEDURE — 4004F PT TOBACCO SCREEN RCVD TLK: CPT | Performed by: INTERNAL MEDICINE

## 2024-11-19 PROCEDURE — G8420 CALC BMI NORM PARAMETERS: HCPCS | Performed by: INTERNAL MEDICINE

## 2024-11-19 PROCEDURE — 99214 OFFICE O/P EST MOD 30 MIN: CPT | Performed by: INTERNAL MEDICINE

## 2024-11-19 PROCEDURE — 1123F ACP DISCUSS/DSCN MKR DOCD: CPT | Performed by: INTERNAL MEDICINE

## 2024-11-19 PROCEDURE — 93000 ELECTROCARDIOGRAM COMPLETE: CPT | Performed by: INTERNAL MEDICINE

## 2024-11-19 PROCEDURE — G8484 FLU IMMUNIZE NO ADMIN: HCPCS | Performed by: INTERNAL MEDICINE

## 2024-11-19 PROCEDURE — 3079F DIAST BP 80-89 MM HG: CPT | Performed by: INTERNAL MEDICINE

## 2024-11-19 RX ORDER — NITROGLYCERIN 0.4 MG/1
0.4 TABLET SUBLINGUAL EVERY 5 MIN PRN
Qty: 25 TABLET | Refills: 3 | Status: SHIPPED | OUTPATIENT
Start: 2024-11-19

## 2024-12-13 ENCOUNTER — HOSPITAL ENCOUNTER (OUTPATIENT)
Dept: MRI IMAGING | Age: 64
Discharge: HOME | End: 2024-12-13
Payer: MEDICARE

## 2024-12-13 DIAGNOSIS — G89.29 CHRONIC LEFT SHOULDER PAIN: ICD-10-CM

## 2024-12-13 DIAGNOSIS — M25.512 CHRONIC LEFT SHOULDER PAIN: ICD-10-CM

## 2024-12-13 PROCEDURE — 73221 MRI JOINT UPR EXTREM W/O DYE: CPT

## 2025-01-17 ENCOUNTER — TELEPHONE (OUTPATIENT)
Dept: CASE MANAGEMENT | Age: 65
End: 2025-01-17

## 2025-01-28 ENCOUNTER — TELEPHONE (OUTPATIENT)
Dept: CARDIOLOGY CLINIC | Age: 65
End: 2025-01-28

## 2025-01-28 NOTE — TELEPHONE ENCOUNTER
Shahla from [facility], called to inform that incoming holter monitor results and today's progress report are coming via fax: [phone].

## 2025-02-14 ENCOUNTER — TRANSCRIBE ORDERS (OUTPATIENT)
Dept: ADMINISTRATIVE | Age: 65
End: 2025-02-14

## 2025-02-14 DIAGNOSIS — F17.210 CIGARETTE SMOKER: ICD-10-CM

## 2025-02-14 DIAGNOSIS — Z87.891 PERSONAL HISTORY OF TOBACCO USE: Primary | ICD-10-CM

## 2025-02-24 ENCOUNTER — HOSPITAL ENCOUNTER (OUTPATIENT)
Age: 65
Setting detail: OBSERVATION
Discharge: HOME | End: 2025-02-25
Attending: STUDENT IN AN ORGANIZED HEALTH CARE EDUCATION/TRAINING PROGRAM | Admitting: INTERNAL MEDICINE
Payer: MEDICARE

## 2025-02-24 ENCOUNTER — APPOINTMENT (OUTPATIENT)
Dept: CT IMAGING | Age: 65
End: 2025-02-24
Payer: MEDICARE

## 2025-02-24 ENCOUNTER — APPOINTMENT (OUTPATIENT)
Dept: GENERAL RADIOLOGY | Age: 65
End: 2025-02-24
Payer: MEDICARE

## 2025-02-24 DIAGNOSIS — R07.9 CHEST PAIN, UNSPECIFIED TYPE: Primary | ICD-10-CM

## 2025-02-24 DIAGNOSIS — I25.2 HISTORY OF MI (MYOCARDIAL INFARCTION): ICD-10-CM

## 2025-02-24 DIAGNOSIS — Z98.890 HISTORY OF AAA (ABDOMINAL AORTIC ANEURYSM) REPAIR: ICD-10-CM

## 2025-02-24 LAB
ALBUMIN SERPL-MCNC: 4.3 G/DL (ref 3.4–5)
ALBUMIN/GLOB SERPL: 2 {RATIO} (ref 1.1–2.2)
ALP SERPL-CCNC: 69 U/L (ref 40–129)
ALT SERPL-CCNC: 26 U/L (ref 10–40)
ANION GAP SERPL CALCULATED.3IONS-SCNC: 11 MMOL/L (ref 3–16)
AST SERPL-CCNC: 30 U/L (ref 15–37)
BASOPHILS # BLD: 0.1 K/UL (ref 0–0.2)
BASOPHILS NFR BLD: 0.9 %
BILIRUB SERPL-MCNC: 0.4 MG/DL (ref 0–1)
BUN SERPL-MCNC: 9 MG/DL (ref 7–20)
CALCIUM SERPL-MCNC: 9.3 MG/DL (ref 8.3–10.6)
CHLORIDE SERPL-SCNC: 106 MMOL/L (ref 99–110)
CO2 SERPL-SCNC: 23 MMOL/L (ref 21–32)
CREAT SERPL-MCNC: 1 MG/DL (ref 0.8–1.3)
DEPRECATED RDW RBC AUTO: 13.2 % (ref 12.4–15.4)
EKG ATRIAL RATE: 70 BPM
EKG DIAGNOSIS: NORMAL
EKG P AXIS: 87 DEGREES
EKG P-R INTERVAL: 168 MS
EKG Q-T INTERVAL: 402 MS
EKG QRS DURATION: 66 MS
EKG QTC CALCULATION (BAZETT): 434 MS
EKG R AXIS: 90 DEGREES
EKG T AXIS: 59 DEGREES
EKG VENTRICULAR RATE: 70 BPM
EOSINOPHIL # BLD: 0 K/UL (ref 0–0.6)
EOSINOPHIL NFR BLD: 0.8 %
GFR SERPLBLD CREATININE-BSD FMLA CKD-EPI: 82 ML/MIN/{1.73_M2}
GLUCOSE SERPL-MCNC: 97 MG/DL (ref 70–99)
HCT VFR BLD AUTO: 41.4 % (ref 40.5–52.5)
HGB BLD-MCNC: 14.4 G/DL (ref 13.5–17.5)
LYMPHOCYTES # BLD: 1.3 K/UL (ref 1–5.1)
LYMPHOCYTES NFR BLD: 21.5 %
MCH RBC QN AUTO: 31.8 PG (ref 26–34)
MCHC RBC AUTO-ENTMCNC: 34.7 G/DL (ref 31–36)
MCV RBC AUTO: 91.7 FL (ref 80–100)
MONOCYTES # BLD: 0.5 K/UL (ref 0–1.3)
MONOCYTES NFR BLD: 8.6 %
NEUTROPHILS # BLD: 4.3 K/UL (ref 1.7–7.7)
NEUTROPHILS NFR BLD: 68.2 %
PLATELET # BLD AUTO: 162 K/UL (ref 135–450)
PMV BLD AUTO: 8.9 FL (ref 5–10.5)
POTASSIUM SERPL-SCNC: 3.9 MMOL/L (ref 3.5–5.1)
PROT SERPL-MCNC: 6.4 G/DL (ref 6.4–8.2)
RBC # BLD AUTO: 4.52 M/UL (ref 4.2–5.9)
SODIUM SERPL-SCNC: 140 MMOL/L (ref 136–145)
TROPONIN, HIGH SENSITIVITY: 10 NG/L (ref 0–22)
TROPONIN, HIGH SENSITIVITY: 11 NG/L (ref 0–22)
TROPONIN, HIGH SENSITIVITY: 11 NG/L (ref 0–22)
TROPONIN, HIGH SENSITIVITY: 9 NG/L (ref 0–22)
WBC # BLD AUTO: 6.2 K/UL (ref 4–11)

## 2025-02-24 PROCEDURE — 93005 ELECTROCARDIOGRAM TRACING: CPT | Performed by: FAMILY MEDICINE

## 2025-02-24 PROCEDURE — 80053 COMPREHEN METABOLIC PANEL: CPT

## 2025-02-24 PROCEDURE — 6370000000 HC RX 637 (ALT 250 FOR IP): Performed by: INTERNAL MEDICINE

## 2025-02-24 PROCEDURE — 6360000002 HC RX W HCPCS: Performed by: INTERNAL MEDICINE

## 2025-02-24 PROCEDURE — 6360000004 HC RX CONTRAST MEDICATION: Performed by: STUDENT IN AN ORGANIZED HEALTH CARE EDUCATION/TRAINING PROGRAM

## 2025-02-24 PROCEDURE — 96375 TX/PRO/DX INJ NEW DRUG ADDON: CPT

## 2025-02-24 PROCEDURE — 2500000003 HC RX 250 WO HCPCS: Performed by: INTERNAL MEDICINE

## 2025-02-24 PROCEDURE — 71260 CT THORAX DX C+: CPT

## 2025-02-24 PROCEDURE — 6360000002 HC RX W HCPCS: Performed by: STUDENT IN AN ORGANIZED HEALTH CARE EDUCATION/TRAINING PROGRAM

## 2025-02-24 PROCEDURE — G0378 HOSPITAL OBSERVATION PER HR: HCPCS

## 2025-02-24 PROCEDURE — 71045 X-RAY EXAM CHEST 1 VIEW: CPT

## 2025-02-24 PROCEDURE — 96376 TX/PRO/DX INJ SAME DRUG ADON: CPT

## 2025-02-24 PROCEDURE — 93010 ELECTROCARDIOGRAM REPORT: CPT | Performed by: INTERNAL MEDICINE

## 2025-02-24 PROCEDURE — 96374 THER/PROPH/DIAG INJ IV PUSH: CPT

## 2025-02-24 PROCEDURE — 85025 COMPLETE CBC W/AUTO DIFF WBC: CPT

## 2025-02-24 PROCEDURE — 93005 ELECTROCARDIOGRAM TRACING: CPT | Performed by: STUDENT IN AN ORGANIZED HEALTH CARE EDUCATION/TRAINING PROGRAM

## 2025-02-24 PROCEDURE — 99285 EMERGENCY DEPT VISIT HI MDM: CPT

## 2025-02-24 PROCEDURE — 36415 COLL VENOUS BLD VENIPUNCTURE: CPT

## 2025-02-24 PROCEDURE — 84484 ASSAY OF TROPONIN QUANT: CPT

## 2025-02-24 RX ORDER — ACETAMINOPHEN 650 MG/1
650 SUPPOSITORY RECTAL EVERY 6 HOURS PRN
Status: DISCONTINUED | OUTPATIENT
Start: 2025-02-24 | End: 2025-02-25 | Stop reason: HOSPADM

## 2025-02-24 RX ORDER — ONDANSETRON 2 MG/ML
4 INJECTION INTRAMUSCULAR; INTRAVENOUS EVERY 6 HOURS PRN
Status: DISCONTINUED | OUTPATIENT
Start: 2025-02-24 | End: 2025-02-25 | Stop reason: HOSPADM

## 2025-02-24 RX ORDER — IOPAMIDOL 755 MG/ML
75 INJECTION, SOLUTION INTRAVASCULAR
Status: COMPLETED | OUTPATIENT
Start: 2025-02-24 | End: 2025-02-24

## 2025-02-24 RX ORDER — MAGNESIUM SULFATE IN WATER 40 MG/ML
2000 INJECTION, SOLUTION INTRAVENOUS PRN
Status: DISCONTINUED | OUTPATIENT
Start: 2025-02-24 | End: 2025-02-25 | Stop reason: HOSPADM

## 2025-02-24 RX ORDER — OXYCODONE HYDROCHLORIDE 5 MG/1
2.5 TABLET ORAL EVERY 6 HOURS PRN
Status: DISCONTINUED | OUTPATIENT
Start: 2025-02-24 | End: 2025-02-25

## 2025-02-24 RX ORDER — SODIUM CHLORIDE 0.9 % (FLUSH) 0.9 %
5-40 SYRINGE (ML) INJECTION PRN
Status: DISCONTINUED | OUTPATIENT
Start: 2025-02-24 | End: 2025-02-25 | Stop reason: HOSPADM

## 2025-02-24 RX ORDER — ACETAMINOPHEN 325 MG/1
650 TABLET ORAL EVERY 6 HOURS PRN
Status: DISCONTINUED | OUTPATIENT
Start: 2025-02-24 | End: 2025-02-25 | Stop reason: HOSPADM

## 2025-02-24 RX ORDER — SODIUM CHLORIDE 0.9 % (FLUSH) 0.9 %
5-40 SYRINGE (ML) INJECTION EVERY 12 HOURS SCHEDULED
Status: DISCONTINUED | OUTPATIENT
Start: 2025-02-24 | End: 2025-02-25 | Stop reason: HOSPADM

## 2025-02-24 RX ORDER — ATORVASTATIN CALCIUM 40 MG/1
40 TABLET, FILM COATED ORAL NIGHTLY
Status: DISCONTINUED | OUTPATIENT
Start: 2025-02-24 | End: 2025-02-25 | Stop reason: HOSPADM

## 2025-02-24 RX ORDER — POTASSIUM CHLORIDE 1500 MG/1
40 TABLET, EXTENDED RELEASE ORAL PRN
Status: DISCONTINUED | OUTPATIENT
Start: 2025-02-24 | End: 2025-02-25 | Stop reason: HOSPADM

## 2025-02-24 RX ORDER — METOPROLOL SUCCINATE 25 MG/1
25 TABLET, EXTENDED RELEASE ORAL NIGHTLY
Status: DISCONTINUED | OUTPATIENT
Start: 2025-02-24 | End: 2025-02-25 | Stop reason: HOSPADM

## 2025-02-24 RX ORDER — POLYETHYLENE GLYCOL 3350 17 G/17G
17 POWDER, FOR SOLUTION ORAL DAILY PRN
Status: DISCONTINUED | OUTPATIENT
Start: 2025-02-24 | End: 2025-02-25 | Stop reason: HOSPADM

## 2025-02-24 RX ORDER — ASPIRIN 81 MG/1
81 TABLET, CHEWABLE ORAL DAILY
Status: DISCONTINUED | OUTPATIENT
Start: 2025-02-25 | End: 2025-02-25 | Stop reason: HOSPADM

## 2025-02-24 RX ORDER — ONDANSETRON 4 MG/1
4 TABLET, ORALLY DISINTEGRATING ORAL EVERY 8 HOURS PRN
Status: DISCONTINUED | OUTPATIENT
Start: 2025-02-24 | End: 2025-02-25 | Stop reason: HOSPADM

## 2025-02-24 RX ORDER — SODIUM CHLORIDE 9 MG/ML
INJECTION, SOLUTION INTRAVENOUS PRN
Status: DISCONTINUED | OUTPATIENT
Start: 2025-02-24 | End: 2025-02-25 | Stop reason: HOSPADM

## 2025-02-24 RX ORDER — ENOXAPARIN SODIUM 100 MG/ML
40 INJECTION SUBCUTANEOUS DAILY
Status: DISCONTINUED | OUTPATIENT
Start: 2025-02-24 | End: 2025-02-25 | Stop reason: HOSPADM

## 2025-02-24 RX ORDER — MORPHINE SULFATE 2 MG/ML
1 INJECTION, SOLUTION INTRAMUSCULAR; INTRAVENOUS EVERY 4 HOURS PRN
Status: DISCONTINUED | OUTPATIENT
Start: 2025-02-24 | End: 2025-02-25 | Stop reason: HOSPADM

## 2025-02-24 RX ORDER — POTASSIUM CHLORIDE 7.45 MG/ML
10 INJECTION INTRAVENOUS PRN
Status: DISCONTINUED | OUTPATIENT
Start: 2025-02-24 | End: 2025-02-25 | Stop reason: HOSPADM

## 2025-02-24 RX ORDER — FENTANYL CITRATE 50 UG/ML
50 INJECTION, SOLUTION INTRAMUSCULAR; INTRAVENOUS ONCE
Status: COMPLETED | OUTPATIENT
Start: 2025-02-24 | End: 2025-02-24

## 2025-02-24 RX ORDER — NITROGLYCERIN 0.4 MG/1
0.4 TABLET SUBLINGUAL EVERY 5 MIN PRN
Status: DISCONTINUED | OUTPATIENT
Start: 2025-02-24 | End: 2025-02-25 | Stop reason: HOSPADM

## 2025-02-24 RX ADMIN — MORPHINE SULFATE 1 MG: 2 INJECTION, SOLUTION INTRAMUSCULAR; INTRAVENOUS at 23:39

## 2025-02-24 RX ADMIN — METOPROLOL SUCCINATE 25 MG: 25 TABLET, EXTENDED RELEASE ORAL at 21:23

## 2025-02-24 RX ADMIN — MORPHINE SULFATE 1 MG: 2 INJECTION, SOLUTION INTRAMUSCULAR; INTRAVENOUS at 18:18

## 2025-02-24 RX ADMIN — FENTANYL CITRATE 50 MCG: 50 INJECTION INTRAMUSCULAR; INTRAVENOUS at 12:10

## 2025-02-24 RX ADMIN — OXYCODONE 2.5 MG: 5 TABLET ORAL at 21:22

## 2025-02-24 RX ADMIN — Medication 10 ML: at 21:27

## 2025-02-24 RX ADMIN — IOPAMIDOL 75 ML: 755 INJECTION, SOLUTION INTRAVENOUS at 13:12

## 2025-02-24 RX ADMIN — ATORVASTATIN CALCIUM 40 MG: 40 TABLET, FILM COATED ORAL at 21:22

## 2025-02-24 ASSESSMENT — PAIN DESCRIPTION - LOCATION
LOCATION: CHEST
LOCATION: ELBOW
LOCATION: ELBOW;SHOULDER
LOCATION: CHEST

## 2025-02-24 ASSESSMENT — PAIN DESCRIPTION - PAIN TYPE
TYPE: ACUTE PAIN
TYPE: ACUTE PAIN

## 2025-02-24 ASSESSMENT — PAIN DESCRIPTION - ORIENTATION
ORIENTATION: MID
ORIENTATION: LEFT;MID
ORIENTATION: LEFT
ORIENTATION: MID
ORIENTATION: LEFT
ORIENTATION: LEFT

## 2025-02-24 ASSESSMENT — PAIN SCALES - WONG BAKER: WONGBAKER_NUMERICALRESPONSE: HURTS A LITTLE BIT

## 2025-02-24 ASSESSMENT — PAIN - FUNCTIONAL ASSESSMENT
PAIN_FUNCTIONAL_ASSESSMENT: PREVENTS OR INTERFERES SOME ACTIVE ACTIVITIES AND ADLS
PAIN_FUNCTIONAL_ASSESSMENT: 0-10
PAIN_FUNCTIONAL_ASSESSMENT: ACTIVITIES ARE NOT PREVENTED

## 2025-02-24 ASSESSMENT — PAIN DESCRIPTION - DESCRIPTORS
DESCRIPTORS: ACHING
DESCRIPTORS: SHARP;SHOOTING
DESCRIPTORS: SHARP;SHOOTING
DESCRIPTORS: ACHING
DESCRIPTORS: SHARP;SHOOTING
DESCRIPTORS: SHARP;SHOOTING

## 2025-02-24 ASSESSMENT — PAIN SCALES - GENERAL
PAINLEVEL_OUTOF10: 7
PAINLEVEL_OUTOF10: 9
PAINLEVEL_OUTOF10: 8
PAINLEVEL_OUTOF10: 5
PAINLEVEL_OUTOF10: 9
PAINLEVEL_OUTOF10: 3
PAINLEVEL_OUTOF10: 9

## 2025-02-24 ASSESSMENT — PAIN DESCRIPTION - DIRECTION: RADIATING_TOWARDS: BACK

## 2025-02-24 ASSESSMENT — HEART SCORE: ECG: NON-SPECIFC REPOLARIZATION DISTURBANCE/LBTB/PM

## 2025-02-24 ASSESSMENT — PAIN DESCRIPTION - ONSET: ONSET: SUDDEN

## 2025-02-24 NOTE — ED NOTES
EKG and troponin collected d/t pt chest pain per inpatient orders. Pt unhappy with current timeline of treatment for his pain. Pt also c/o monitoring equipment and positioning. Sympathized with patient and helped talk him through the pain and try to find a position. Pt spoke extensively about his history with pain and his family history of medical issues. Pt would like to be able to go to physical therapy for this issue that he has scheduled for tomorrow.

## 2025-02-24 NOTE — H&P
CORONARY ANGIOPLASTY WITH STENT PLACEMENT  06/2022    x 1 stent    WISDOM TOOTH EXTRACTION         Medications Prior to Admission:      Prior to Admission medications    Medication Sig Start Date End Date Taking? Authorizing Provider   nitroGLYCERIN (NITROSTAT) 0.4 MG SL tablet Place 1 tablet under the tongue every 5 minutes as needed for Chest pain 11/19/24  Yes Immanuel Chadwick MD   metoprolol succinate (TOPROL XL) 25 MG extended release tablet Take 1 tablet by mouth daily  Patient taking differently: Take 1 tablet by mouth at bedtime 10/10/24  Yes Immanuel Chadwick MD   rosuvastatin (CRESTOR) 40 MG tablet Take 1 tablet by mouth daily  Patient taking differently: Take 1 tablet by mouth at bedtime 10/10/24  Yes Immanuel Chadwick MD   aspirin 81 MG chewable tablet Take 1 tablet by mouth daily  Patient taking differently: Take 1 tablet by mouth at bedtime 9/9/22  Yes Immanuel Chadwick MD       Allergies:  Codeine and Wellbutrin [bupropion]    Social History:      The patient currently lives at home    TOBACCO:   reports that he has been smoking cigarettes. He started smoking about 55 years ago. He has a 82.7 pack-year smoking history. He has never used smokeless tobacco.  ETOH:   reports current alcohol use.  E-cigarette/Vaping       Questions Responses    E-cigarette/Vaping Use Former User    Start Date     Passive Exposure     Quit Date     Counseling Given     Comments               Family History:      Reviewed and negative in regards to presenting illness/complaint.        Problem Relation Age of Onset    Heart Disease Mother         triple bypass    Other Father         disappeared in 1970    Alcohol Abuse Father     Cancer Brother         Hodgkin's Lymphoma     High Blood Pressure Maternal Uncle        REVIEW OF SYSTEMS COMPLETED:   Pertinent positives as noted in the HPI. All other systems reviewed and negative.    PHYSICAL EXAM PERFORMED:    /68   Pulse 67   Temp 98 °F (36.7

## 2025-02-24 NOTE — ED NOTES
Patient yelling at this RN that he wants pain medication.  Tylenol 650mg ordered and was offered the medication and he yelled at this RN that Tylenol is not a pain medication.  He said he was leaving and then said he would stay.  He is in the [address] yelling at staff and said he will mely the hospital.  RN and MD have been in room several times and he continues to say no one is updating him.

## 2025-02-24 NOTE — PROGRESS NOTES
Medication Reconciliation    List of medications patient is currently taking is complete.     Source of information: 1. Conversation with patient at bedside                                      2. EPIC records      Notes regarding home medications:   1. Patient took a NTG tablet earlier this morning - he has not taken any of his other home medications yet today.    Lul Echavarria [facility], PharmD, BCPS  2/24/2025 3:09 PM
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
## 2025-02-24 NOTE — ED NOTES
Pt assisted up at bedside to use the urinal. Pt only required assistance managing the monitor cables and getting the siderail down. Pt stood at bedside and declined assistance with urinating. Gait steady and strong.

## 2025-02-24 NOTE — ED PROVIDER NOTES
[facility] EMERGENCY DEPARTMENT      EMERGENCY MEDICINE     Pt Name: Jordan Samuels  MRN: [id number]  Birthdate 1960  Date of evaluation: 2/24/2025  Provider: Samy Lincoln DO    CHIEF COMPLAINT       Chief Complaint   Patient presents with    Chest Pain     Sneezed today and has chest pain radiating into the back. Hx of same.     HISTORY OF PRESENT ILLNESS   Jordan Samuels is a 65 y.o. male who presents to the emergency department for left-sided chest pain radiating to the back.  States he has a history of severe degenerative disc disease as well as a history of an MI and a repair of AAA back in 2023.  Patient states he coughed today and suddenly his symptoms started.  He took a full dose aspirin prior to arrival to the emergency department.  Pain primarily on the left side of the chest as well as radiating down to the left arm at approximately the region of the elbow.  States that it could also be potentially back pain as he does have a history of severe back pain.  He states it is never started after a mild cough.  He denies shortness of breath.  No nausea or vomiting.  Does state that this seems similar from his previous heart attack.        PASTMEDICAL HISTORY     Past Medical History:   Diagnosis Date    AAA (abdominal aortic aneurysm) 03/22/2021    Repaired endovascular 6-27-23 / Dr. Romero    Acute inferior myocardial infarction (HCC) 6/14/2022    Cardiac cath 6/14/22 PCI to his 100% occluded dominant RCA. He received a 3.5mm X 34mm Willie stent restoring WANDA 3 flow with 0% residual stenosis.  His LVEF is 55%.    Chronic left-sided low back pain with left-sided sciatica     Chronic nausea     Chronic seasonal allergic rhinitis due to pollen     Coronary artery disease involving native coronary artery of native heart without angina pectoris 04/20/2023    DDD (degenerative disc disease), cervical     DDD (degenerative disc disease), lumbar     Essential hypertension     History of

## 2025-02-25 ENCOUNTER — HOSPITAL ENCOUNTER (OUTPATIENT)
Age: 65
Setting detail: OBSERVATION
Discharge: HOME | End: 2025-02-27
Payer: MEDICARE

## 2025-02-25 ENCOUNTER — APPOINTMENT (OUTPATIENT)
Dept: NUCLEAR MEDICINE | Age: 65
End: 2025-02-25
Payer: MEDICARE

## 2025-02-25 VITALS
WEIGHT: 127.87 LBS | DIASTOLIC BLOOD PRESSURE: 82 MMHG | SYSTOLIC BLOOD PRESSURE: 128 MMHG | HEART RATE: 79 BPM | RESPIRATION RATE: 17 BRPM | TEMPERATURE: 98.5 F | OXYGEN SATURATION: 93 % | BODY MASS INDEX: 20.55 KG/M2 | HEIGHT: 66 IN

## 2025-02-25 VITALS — BODY MASS INDEX: 20.64 KG/M2 | HEIGHT: 66 IN

## 2025-02-25 LAB
ANION GAP SERPL CALCULATED.3IONS-SCNC: 10 MMOL/L (ref 3–16)
BUN SERPL-MCNC: 12 MG/DL (ref 7–20)
CALCIUM SERPL-MCNC: 8.9 MG/DL (ref 8.3–10.6)
CHLORIDE SERPL-SCNC: 109 MMOL/L (ref 99–110)
CO2 SERPL-SCNC: 22 MMOL/L (ref 21–32)
CREAT SERPL-MCNC: 0.9 MG/DL (ref 0.8–1.3)
DEPRECATED RDW RBC AUTO: 13.2 % (ref 12.4–15.4)
ECHO BSA: 1.64 M2
EKG ATRIAL RATE: 82 BPM
EKG DIAGNOSIS: NORMAL
EKG P AXIS: 85 DEGREES
EKG P-R INTERVAL: 148 MS
EKG Q-T INTERVAL: 366 MS
EKG QRS DURATION: 70 MS
EKG QTC CALCULATION (BAZETT): 427 MS
EKG R AXIS: 90 DEGREES
EKG T AXIS: 56 DEGREES
EKG VENTRICULAR RATE: 82 BPM
GFR SERPLBLD CREATININE-BSD FMLA CKD-EPI: >90 ML/MIN/{1.73_M2}
GLUCOSE SERPL-MCNC: 100 MG/DL (ref 70–99)
HCT VFR BLD AUTO: 38.8 % (ref 40.5–52.5)
HGB BLD-MCNC: 13.6 G/DL (ref 13.5–17.5)
MCH RBC QN AUTO: 32.1 PG (ref 26–34)
MCHC RBC AUTO-ENTMCNC: 35.2 G/DL (ref 31–36)
MCV RBC AUTO: 91.2 FL (ref 80–100)
NUC REST DIASTOLIC VOLUME INDEX: 49 ML/M2
NUC REST EJECTION FRACTION: 67 %
NUC REST SYSTOLIC VOLUME INDEX: 16 ML/M2
PLATELET # BLD AUTO: 149 K/UL (ref 135–450)
PMV BLD AUTO: 8.5 FL (ref 5–10.5)
POTASSIUM SERPL-SCNC: 4.1 MMOL/L (ref 3.5–5.1)
RBC # BLD AUTO: 4.25 M/UL (ref 4.2–5.9)
SODIUM SERPL-SCNC: 141 MMOL/L (ref 136–145)
STRESS ANGINA INDEX: 0
STRESS BASELINE DIAS BP: 81 MMHG
STRESS BASELINE HR: 84 BPM
STRESS BASELINE SYS BP: 134 MMHG
STRESS ESTIMATED WORKLOAD: 2.1 METS
STRESS EXERCISE DUR MIN: 10 MIN
STRESS EXERCISE DUR SEC: 14 SEC
STRESS PEAK DIAS BP: 91 MMHG
STRESS PEAK SYS BP: 163 MMHG
STRESS PERCENT HR ACHIEVED: 90 %
STRESS POST PEAK HR: 137 BPM
STRESS RATE PRESSURE PRODUCT: NORMAL BPM*MMHG
STRESS SR DUKE TREADMILL SCORE: 10
STRESS ST DEPRESSION: 0 MM
STRESS TARGET HR: 153 BPM
WBC # BLD AUTO: 5.1 K/UL (ref 4–11)

## 2025-02-25 PROCEDURE — 36415 COLL VENOUS BLD VENIPUNCTURE: CPT

## 2025-02-25 PROCEDURE — 93017 CV STRESS TEST TRACING ONLY: CPT

## 2025-02-25 PROCEDURE — 6370000000 HC RX 637 (ALT 250 FOR IP): Performed by: INTERNAL MEDICINE

## 2025-02-25 PROCEDURE — 6370000000 HC RX 637 (ALT 250 FOR IP): Performed by: HOSPITALIST

## 2025-02-25 PROCEDURE — A9502 TC99M TETROFOSMIN: HCPCS | Performed by: INTERNAL MEDICINE

## 2025-02-25 PROCEDURE — 3430000000 HC RX DIAGNOSTIC RADIOPHARMACEUTICAL: Performed by: INTERNAL MEDICINE

## 2025-02-25 PROCEDURE — 85027 COMPLETE CBC AUTOMATED: CPT

## 2025-02-25 PROCEDURE — G0378 HOSPITAL OBSERVATION PER HR: HCPCS

## 2025-02-25 PROCEDURE — 6360000002 HC RX W HCPCS: Performed by: INTERNAL MEDICINE

## 2025-02-25 PROCEDURE — 78452 HT MUSCLE IMAGE SPECT MULT: CPT

## 2025-02-25 PROCEDURE — 96376 TX/PRO/DX INJ SAME DRUG ADON: CPT

## 2025-02-25 PROCEDURE — 80048 BASIC METABOLIC PNL TOTAL CA: CPT

## 2025-02-25 RX ORDER — TRAMADOL HYDROCHLORIDE 50 MG/1
50 TABLET ORAL DAILY PRN
COMMUNITY

## 2025-02-25 RX ORDER — TRAMADOL HYDROCHLORIDE 50 MG/1
50 TABLET ORAL EVERY 8 HOURS PRN
Status: DISCONTINUED | OUTPATIENT
Start: 2025-02-25 | End: 2025-02-25 | Stop reason: HOSPADM

## 2025-02-25 RX ADMIN — TETROFOSMIN 13.2 MILLICURIE: 1.38 INJECTION, POWDER, LYOPHILIZED, FOR SOLUTION INTRAVENOUS at 10:47

## 2025-02-25 RX ADMIN — TETROFOSMIN 30.2 MILLICURIE: 1.38 INJECTION, POWDER, LYOPHILIZED, FOR SOLUTION INTRAVENOUS at 12:25

## 2025-02-25 RX ADMIN — TRAMADOL HYDROCHLORIDE 50 MG: 50 TABLET, COATED ORAL at 10:01

## 2025-02-25 RX ADMIN — ASPIRIN 81 MG: 81 TABLET, CHEWABLE ORAL at 07:36

## 2025-02-25 RX ADMIN — MORPHINE SULFATE 1 MG: 2 INJECTION, SOLUTION INTRAMUSCULAR; INTRAVENOUS at 14:53

## 2025-02-25 RX ADMIN — MORPHINE SULFATE 1 MG: 2 INJECTION, SOLUTION INTRAMUSCULAR; INTRAVENOUS at 06:50

## 2025-02-25 RX ADMIN — OXYCODONE 2.5 MG: 5 TABLET ORAL at 03:39

## 2025-02-25 ASSESSMENT — PAIN DESCRIPTION - ORIENTATION
ORIENTATION: LEFT
ORIENTATION: LEFT
ORIENTATION: LOWER
ORIENTATION: LEFT

## 2025-02-25 ASSESSMENT — PAIN DESCRIPTION - LOCATION
LOCATION: BACK
LOCATION: SHOULDER;BACK
LOCATION: ELBOW;SHOULDER
LOCATION: SHOULDER

## 2025-02-25 ASSESSMENT — PAIN DESCRIPTION - DESCRIPTORS
DESCRIPTORS: ACHING
DESCRIPTORS: ACHING
DESCRIPTORS: ACHING;SHARP;SPASM
DESCRIPTORS: ACHING

## 2025-02-25 ASSESSMENT — PAIN SCALES - GENERAL
PAINLEVEL_OUTOF10: 6
PAINLEVEL_OUTOF10: 4
PAINLEVEL_OUTOF10: 6
PAINLEVEL_OUTOF10: 7
PAINLEVEL_OUTOF10: 8
PAINLEVEL_OUTOF10: 4
PAINLEVEL_OUTOF10: 2
PAINLEVEL_OUTOF10: 4

## 2025-02-25 ASSESSMENT — PAIN SCALES - WONG BAKER
WONGBAKER_NUMERICALRESPONSE: HURTS A LITTLE BIT
WONGBAKER_NUMERICALRESPONSE: HURTS A LITTLE BIT

## 2025-02-25 NOTE — ED NOTES
ED TO INPATIENT SBAR HANDOFF    Patient Name: Jordan Samuels   Preferred Name: Jordan  : 1960  65 y.o.   Family/Caregiver Present: no   Code Status Order: Full Code  PO Status: NPO:No  Telemetry Order: Yes  C-SSRS: Risk of Suicide: No Risk  Sitter no   Restraints:   no  Sepsis Risk Score      Situation  Chief Complaint   Patient presents with    Chest Pain     Sneezed today and has chest pain radiating into the back. Hx of same.     Brief Description of Patient's Condition: Pt is impatient and requesting a lot of attention. Pt is sitting up in bed complaining about the speed and direction of treatment. VS stable. Respirations even, easy, unlabored on room air. Normal sinus on the monitor. Ambulatory independently. Continent to bladder and bowel.  Mental Status: oriented, alert, coherent, logical, thought processes intact, and able to concentrate and follow conversation  Arrived from:Home  Imaging:   CT CHEST PULMONARY EMBOLISM W CONTRAST   Final Result   No evidence of pulmonary embolism or acute pulmonary abnormality.      Slightly enlarged small pericardial effusion.         XR CHEST PORTABLE   Final Result   No radiographic evidence of acute cardiopulmonary pathology.           Abnormal labs: Abnormal Labs Reviewed - No abnormal labs to display    Background  Allergies:   Allergies   Allergen Reactions    Codeine Nausea Only    Wellbutrin [Bupropion] Other (See Comments)     Insomnia      History:   Past Medical History:   Diagnosis Date    AAA (abdominal aortic aneurysm) 2021    Repaired endovascular 23 / Dr. Romero    Acute inferior myocardial infarction (HCC) 2022    Cardiac cath 22 PCI to his 100% occluded dominant RCA. He received a 3.5mm X 34mm Butler stent restoring WANDA 3 flow with 0% residual stenosis.  His LVEF is 55%.    Chronic left-sided low back pain with left-sided sciatica     Chronic nausea     Chronic seasonal allergic rhinitis due to pollen     Coronary artery disease

## 2025-02-25 NOTE — PROGRESS NOTES
4 Eyes Skin Assessment     NAME:  Jordan Samuels  YOB: 1960  MEDICAL RECORD NUMBER:  [id number]    The patient is being assessed for  Admission    I agree that at least one RN has performed a thorough Head to Toe Skin Assessment on the patient. ALL assessment sites listed below have been assessed.      Areas assessed by both nurses:    Head, Face, Ears, Shoulders, Back, Chest, Arms, Elbows, Hands, Sacrum. Buttock, Coccyx, Ischium, Legs. Feet and Heels, and Under Medical Devices         Does the Patient have a Wound? No noted wound(s)       North Prevention initiated by RN: No  Wound Care Orders initiated by RN: No    Pressure Injury (Stage 3,4, Unstageable, DTI, NWPT, and Complex wounds) if present, place Wound referral order by RN under : No    New Ostomies, if present place, Ostomy referral order under : No     Nurse 1 eSignature: Electronically signed by Felicita Torres RN on 2/24/25 at 10:26 PM EST    **SHARE this note so that the co-signing nurse can place an eSignature**    Nurse 2 eSignature: Electronically signed by Elliott Huizar RN on 2/24/25 at 10:31 PM EST

## 2025-02-25 NOTE — PROGRESS NOTES
Discharge orders acknowledged by RN. Discharge teaching completed with pt and family. AVS reviewed and all questions answered. Recommended patient following up with his PCP. Pt and resources given for discharge. IV removed. Bedside monitor removed from pt. Pt vitals WNL. Patient was unable to get a ride through his insurance. His phone was lost during admission, and we were unable to contact family to provide patient with a ride. The nursing supervisor approved a Lyft for patient. Lyft ride set up by case management. Pt discharged with all belongings to home with Lyft. Pt transported off of unit via wheelchair. No complications.

## 2025-02-25 NOTE — PRE-PROCEDURE INSTRUCTIONS
Spoke to patient on telephone and explained his nuclear exercise stress test including his target heart rate, described the exercise portion which he stated he has been unsuccessful completing in the past, reviewed the incline and speed can be modified during the test with communication from him, also reviewed that he took his toprol xl last night.  Advised that our target time for his test is around lunch based on our current patient schedule but this is tentative.  He expressed he will attempt exercise and does not wish to complete with regadenson if he fails to reach target.

## 2025-02-25 NOTE — PROGRESS NOTES
Patient stated he's very upset because, \"no one is telling me what is going on.\" I again informed patient on plan for stress test today. He demanded to know a time and I told him that we do not have their schedule yet, but will get it to him as soon as I know. He stated, \"so you expect me to stay here starving, dehydrated, and in pain until then?! This is unacceptable!\" Patient noted to be given morphine and oxycodone. I asked patient what he typically takes at home for pain and he stated he takes tramadol but the rx is \"probably .\" Stated he's frustrated that we are just focused on his heart and not treating his (chronic) pain that he's had since he was 15. Will call patient's pharmacy to determine if there is an active tramadol prescription and update MD.    Patient also stating that he will not take any medicine for the stress test because it \"almost killed me\" in the past. Noted order for nuclear stress test. Will notify [facility].

## 2025-02-25 NOTE — PROGRESS NOTES
Pt admitted to room 5251 at 2100. Pt able to ambulate over to bed. Pt placed on a portable monitor. Pt is normal sinus on the monitor confirmed by CMU. Pt is alert and oriented to the room. Fall precautions in place. Call light within reach. Pt denies any further needs at this time.

## 2025-02-25 NOTE — PROGRESS NOTES
Walked into room for bedside report and patient mumbling, \"you either get me something to drink or I'll get it myself.\" Patient NPO for stress test today. Started educating patient on why he needed to be NPO but education was declined, as he knows more about the test than nursing staff. I informed patient that I would be back shortly.

## 2025-02-26 NOTE — DISCHARGE SUMMARY
Hospital Medicine Discharge Summary    Patient ID: Jordan Samuels      Patient's PCP: No primary care provider on file.    Admit Date: 2/24/2025     Discharge Date: 2/25/2025      Admitting Physician: Tam Matson MD     Discharge Physician: Roberto Robertson MD     Discharge Diagnoses:       Active Hospital Problems    Diagnosis     Chest pain [R07.9]        The patient was seen and examined on day of discharge and this discharge summary is in conjunction with any daily progress note from day of discharge.    Hospital Course:    65 y.o. male who presented to [facility] with left-sided chest pain, radiating to his back no obvious trigger no alleviating or aggravating factors, patient with previous history of MI and AAA repaired 23, denies fever chills nausea vomiting cough.  Discussed with ED provider.  Plan to place in observation    Chest pain, likely musculoskeletal cannot completely exclude coronary artery disease, placed on observation, telemetry monitoring nuclear stress test, general the patient is skeptical about stress test, I called and discussed with cardiology will follow troponin, cardiology consulted from ED CBC CMP in a.m.  History of coronary artery disease, troponin negative stress test as above  History of AAA  Essential hypertension p.o. medication  Tobacco abuse counseled for quitting  DVT Prophylaxis: Lovenox  Diet: No diet orders on file  Code Status: Prior    S/p NM stress test on 2/25     igned         Patient Stress test low risk. Okay to discharge per Dr. Farrukh Gregorio MSN, RN [facility]               Clinically stable  Vital signs stable  Denies chest pain or sob        Denies abdominal pain , nausea, vomiting , overt GI bleed , fever , chills       Physical Exam Performed:     /82   Pulse 79   Temp 98.5 °F (36.9 °C) (Oral)   Resp 17   Ht 1.676 m (5' 6\")   Wt 58 kg (127 lb 13.9 oz)   SpO2 93%   BMI 20.64 kg/m²       General appearance:  No

## 2025-05-02 ENCOUNTER — TRANSCRIBE ORDERS (OUTPATIENT)
Dept: ADMINISTRATIVE | Age: 65
End: 2025-05-02

## 2025-05-02 DIAGNOSIS — K56.7 ILEUS (HCC): ICD-10-CM

## 2025-05-02 DIAGNOSIS — Z87.19 HISTORY OF INGUINAL HERNIA: Primary | ICD-10-CM

## 2025-05-13 ENCOUNTER — HOSPITAL ENCOUNTER (OUTPATIENT)
Dept: CT IMAGING | Age: 65
Discharge: HOME | End: 2025-05-13
Payer: MEDICARE

## 2025-05-13 DIAGNOSIS — K56.7 ILEUS (HCC): ICD-10-CM

## 2025-05-13 DIAGNOSIS — Z87.19 HISTORY OF INGUINAL HERNIA: ICD-10-CM

## 2025-05-13 LAB
PERFORMED ON: NORMAL
POC CREATININE: 1 MG/DL (ref 0.8–1.3)
POC SAMPLE TYPE: NORMAL

## 2025-05-13 PROCEDURE — 74177 CT ABD & PELVIS W/CONTRAST: CPT

## 2025-05-13 PROCEDURE — 6360000004 HC RX CONTRAST MEDICATION: Performed by: INTERNAL MEDICINE

## 2025-05-13 PROCEDURE — 82565 ASSAY OF CREATININE: CPT

## 2025-05-13 RX ORDER — IOPAMIDOL 755 MG/ML
75 INJECTION, SOLUTION INTRAVASCULAR
Status: COMPLETED | OUTPATIENT
Start: 2025-05-13 | End: 2025-05-13

## 2025-05-13 RX ORDER — IOPAMIDOL 612 MG/ML
50 INJECTION, SOLUTION INTRAVASCULAR
Status: COMPLETED | OUTPATIENT
Start: 2025-05-13 | End: 2025-05-13

## 2025-05-13 RX ADMIN — IOPAMIDOL 50 ML: 612 INJECTION, SOLUTION INTRAVENOUS at 12:29

## 2025-05-13 RX ADMIN — IOPAMIDOL 75 ML: 755 INJECTION, SOLUTION INTRAVENOUS at 12:29

## 2025-08-19 ENCOUNTER — OFFICE VISIT (OUTPATIENT)
Dept: VASCULAR SURGERY | Age: 65
End: 2025-08-19
Payer: MEDICARE

## 2025-08-19 VITALS
BODY MASS INDEX: 19.08 KG/M2 | SYSTOLIC BLOOD PRESSURE: 127 MMHG | WEIGHT: 118.2 LBS | DIASTOLIC BLOOD PRESSURE: 71 MMHG | HEART RATE: 89 BPM

## 2025-08-19 DIAGNOSIS — K55.1 MESENTERIC ARTERY STENOSIS: ICD-10-CM

## 2025-08-19 DIAGNOSIS — I77.4 CELIAC ARTERY STENOSIS: ICD-10-CM

## 2025-08-19 DIAGNOSIS — I71.43 INFRARENAL ABDOMINAL AORTIC ANEURYSM (AAA) WITHOUT RUPTURE: Primary | ICD-10-CM

## 2025-08-19 PROCEDURE — 3078F DIAST BP <80 MM HG: CPT | Performed by: NURSE PRACTITIONER

## 2025-08-19 PROCEDURE — 3074F SYST BP LT 130 MM HG: CPT | Performed by: NURSE PRACTITIONER

## 2025-08-19 PROCEDURE — 1159F MED LIST DOCD IN RCRD: CPT | Performed by: NURSE PRACTITIONER

## 2025-08-19 PROCEDURE — 1123F ACP DISCUSS/DSCN MKR DOCD: CPT | Performed by: NURSE PRACTITIONER

## 2025-08-19 PROCEDURE — 1160F RVW MEDS BY RX/DR IN RCRD: CPT | Performed by: NURSE PRACTITIONER

## 2025-08-19 PROCEDURE — 4004F PT TOBACCO SCREEN RCVD TLK: CPT | Performed by: NURSE PRACTITIONER

## 2025-08-19 PROCEDURE — G8420 CALC BMI NORM PARAMETERS: HCPCS | Performed by: NURSE PRACTITIONER

## 2025-08-19 PROCEDURE — 3017F COLORECTAL CA SCREEN DOC REV: CPT | Performed by: NURSE PRACTITIONER

## 2025-08-19 PROCEDURE — 99214 OFFICE O/P EST MOD 30 MIN: CPT | Performed by: NURSE PRACTITIONER

## 2025-08-19 PROCEDURE — G8427 DOCREV CUR MEDS BY ELIG CLIN: HCPCS | Performed by: NURSE PRACTITIONER

## 2025-08-19 RX ORDER — CYCLOBENZAPRINE HCL 10 MG
TABLET ORAL
COMMUNITY
Start: 2025-08-07

## 2025-08-19 RX ORDER — LEVOCETIRIZINE DIHYDROCHLORIDE 5 MG/1
TABLET, FILM COATED ORAL
COMMUNITY
Start: 2025-08-07

## 2025-08-28 DIAGNOSIS — I77.4 CELIAC ARTERY STENOSIS: Primary | ICD-10-CM

## 2025-08-28 DIAGNOSIS — K55.1 MESENTERIC ARTERY STENOSIS: ICD-10-CM

## 2025-08-28 DIAGNOSIS — I71.43 ANEURYSM OF INFRARENAL ABDOMINAL AORTA, UNSPECIFIED WHETHER RUPTURED: ICD-10-CM

## 2025-08-28 DIAGNOSIS — I72.3 ILIAC ARTERY ANEURYSM, RIGHT: ICD-10-CM

## (undated) DEVICE — SHEET,DRAPE,53X77,STERILE: Brand: MEDLINE

## (undated) DEVICE — CATHETER INTVENT AD 5FR L100CM DIAG PGTL W/ SIDE H PERIPH

## (undated) DEVICE — PERCLOSE™ PROSTYLE™ SUTURE-MEDIATED CLOSURE AND REPAIR SYSTEM: Brand: PERCLOSE™ PROSTYLE™

## (undated) DEVICE — SUTURE VCRL + SZ 3-0 L18IN CT 1 CR ABSRB VCP838D

## (undated) DEVICE — TOWEL,OR,DSP,ST,WHITE,DLX,XR,4/PK,20PK/C: Brand: MEDLINE

## (undated) DEVICE — SHEATH INTRO 16FR L33CM OD6.1MM ID5.3MM HYDRPHLC KINK

## (undated) DEVICE — GAUZE,SPONGE,4"X4",16PLY,XRAY,STRL,LF: Brand: MEDLINE

## (undated) DEVICE — Device

## (undated) DEVICE — PACK DRP UNIV W BK TBL MAYO STD BTM TOP SIDE UTIL CVR ZONE

## (undated) DEVICE — RADIFOCUS GLIDEWIRE ADVANTAGE GUIDEWIRE: Brand: GLIDEWIRE ADVANTAGE

## (undated) DEVICE — NASAL CUTTING FORCEPS, SIZE 1: Brand: N.A.

## (undated) DEVICE — SUTURE PROL SZ 5-0 L36IN NONABSORBABLE BLU L13MM C-1 3/8 8720H

## (undated) DEVICE — RADIFOCUS GLIDECATH: Brand: GLIDECATH

## (undated) DEVICE — MAJOR VASCULAR: Brand: MEDLINE INDUSTRIES, INC.

## (undated) DEVICE — BASIC SINGLE BASIN 1-LF: Brand: MEDLINE INDUSTRIES, INC.

## (undated) DEVICE — MERCY HEALTH WEST TURNOVER: Brand: MEDLINE INDUSTRIES, INC.

## (undated) DEVICE — RADIFOCUS GLIDEWIRE: Brand: GLIDEWIRE

## (undated) DEVICE — 450 ML BOTTLE OF 0.05% CHLORHEXIDINE GLUCONATE IN 99.95% STERILE WATER FOR IRRIGATION, USP AND APPLICATOR.: Brand: IRRISEPT ANTIMICROBIAL WOUND LAVAGE

## (undated) DEVICE — CATHETER ANGIO 5FR L65CM 0.035IN VIS OMNI FLUSH-0 SFT TIP

## (undated) DEVICE — INTRODUCER SHTH 8FR CANN L11CM DIL TIP 35MM BLU TUNGSTEN

## (undated) DEVICE — SUTURE PERMAHAND SZ 4-0 L18IN NONABSORBABLE BLK SILK BRAID A183H

## (undated) DEVICE — SHEATH INTRO 12FR L33CM OD4.7MM ID4MM HYDRPHLC KINK

## (undated) DEVICE — SUTURE PROL SZ 6-0 L30IN NONABSORBABLE BLU L11MM BV 3/8 CIR 8776H

## (undated) DEVICE — CATHETER PTCA BLLN L4CM INFL 10-37MM CATH L90CM MOLDING

## (undated) DEVICE — SOLUTION IV 1000ML 0.9% SOD CHL PH 5 INJ USP VIAFLX PLAS

## (undated) DEVICE — SUTURE PERMAHAND SZ 2-0 L18IN NONABSORBABLE BLK L26MM SH C012D

## (undated) DEVICE — 3M™ IOBAN™ 2 ANTIMICROBIAL INCISE DRAPE 6650EZ: Brand: IOBAN™ 2

## (undated) DEVICE — SUTURE PERMAHAND SZ 2-0 L12X18IN NONABSORBABLE BLK SILK A185H

## (undated) DEVICE — GLOVE ORTHO 7 1/2   MSG9475

## (undated) DEVICE — SPONGE LAP W18XL18IN WHT COT 4 PLY FLD STRUNG RADPQ DISP ST 2 PER PACK

## (undated) DEVICE — DILATOR: Brand: COOK

## (undated) DEVICE — 1LYRTR 16FR10ML100%SIL UMS SNP: Brand: MEDLINE INDUSTRIES, INC.